# Patient Record
Sex: MALE | Race: WHITE | NOT HISPANIC OR LATINO | URBAN - METROPOLITAN AREA
[De-identification: names, ages, dates, MRNs, and addresses within clinical notes are randomized per-mention and may not be internally consistent; named-entity substitution may affect disease eponyms.]

---

## 2017-07-09 ENCOUNTER — INPATIENT (INPATIENT)
Facility: HOSPITAL | Age: 72
LOS: 4 days | Discharge: HOME | End: 2017-07-14
Attending: INTERNAL MEDICINE | Admitting: INTERNAL MEDICINE
Payer: MEDICARE

## 2017-07-09 VITALS
OXYGEN SATURATION: 97 % | SYSTOLIC BLOOD PRESSURE: 154 MMHG | DIASTOLIC BLOOD PRESSURE: 83 MMHG | TEMPERATURE: 99 F | RESPIRATION RATE: 16 BRPM | HEART RATE: 85 BPM

## 2017-07-09 DIAGNOSIS — Z29.9 ENCOUNTER FOR PROPHYLACTIC MEASURES, UNSPECIFIED: ICD-10-CM

## 2017-07-09 DIAGNOSIS — E78.5 HYPERLIPIDEMIA, UNSPECIFIED: ICD-10-CM

## 2017-07-09 DIAGNOSIS — R06.02 SHORTNESS OF BREATH: ICD-10-CM

## 2017-07-09 DIAGNOSIS — E11.9 TYPE 2 DIABETES MELLITUS WITHOUT COMPLICATIONS: ICD-10-CM

## 2017-07-09 DIAGNOSIS — I10 ESSENTIAL (PRIMARY) HYPERTENSION: ICD-10-CM

## 2017-07-09 DIAGNOSIS — I25.10 ATHEROSCLEROTIC HEART DISEASE OF NATIVE CORONARY ARTERY WITHOUT ANGINA PECTORIS: ICD-10-CM

## 2017-07-09 DIAGNOSIS — I50.9 HEART FAILURE, UNSPECIFIED: ICD-10-CM

## 2017-07-09 DIAGNOSIS — C61 MALIGNANT NEOPLASM OF PROSTATE: ICD-10-CM

## 2017-07-09 LAB
ALBUMIN SERPL ELPH-MCNC: 3.6 G/DL — SIGNIFICANT CHANGE UP (ref 3.3–5)
ALP SERPL-CCNC: 76 U/L — SIGNIFICANT CHANGE UP (ref 40–120)
ALT FLD-CCNC: 10 U/L — SIGNIFICANT CHANGE UP (ref 4–41)
AST SERPL-CCNC: 14 U/L — SIGNIFICANT CHANGE UP (ref 4–40)
BASE EXCESS BLDV CALC-SCNC: 5.8 MMOL/L — SIGNIFICANT CHANGE UP
BASOPHILS # BLD AUTO: 0.03 K/UL — SIGNIFICANT CHANGE UP (ref 0–0.2)
BASOPHILS NFR BLD AUTO: 0.3 % — SIGNIFICANT CHANGE UP (ref 0–2)
BILIRUB SERPL-MCNC: 0.2 MG/DL — SIGNIFICANT CHANGE UP (ref 0.2–1.2)
BLOOD GAS VENOUS - CREATININE: 1.16 MG/DL — SIGNIFICANT CHANGE UP (ref 0.5–1.3)
BUN SERPL-MCNC: 24 MG/DL — HIGH (ref 7–23)
CALCIUM SERPL-MCNC: 9.5 MG/DL — SIGNIFICANT CHANGE UP (ref 8.4–10.5)
CHLORIDE BLDV-SCNC: 107 MMOL/L — SIGNIFICANT CHANGE UP (ref 96–108)
CHLORIDE SERPL-SCNC: 102 MMOL/L — SIGNIFICANT CHANGE UP (ref 98–107)
CHOLEST SERPL-MCNC: 118 MG/DL — LOW (ref 120–199)
CK MB BLD-MCNC: 2.03 NG/ML — SIGNIFICANT CHANGE UP (ref 1–6.6)
CK MB BLD-MCNC: 2.19 NG/ML — SIGNIFICANT CHANGE UP (ref 1–6.6)
CK MB BLD-MCNC: SIGNIFICANT CHANGE UP (ref 0–2.5)
CK SERPL-CCNC: 56 U/L — SIGNIFICANT CHANGE UP (ref 30–200)
CK SERPL-CCNC: 65 U/L — SIGNIFICANT CHANGE UP (ref 30–200)
CO2 SERPL-SCNC: 26 MMOL/L — SIGNIFICANT CHANGE UP (ref 22–31)
CREAT SERPL-MCNC: 1.26 MG/DL — SIGNIFICANT CHANGE UP (ref 0.5–1.3)
EOSINOPHIL # BLD AUTO: 0.16 K/UL — SIGNIFICANT CHANGE UP (ref 0–0.5)
EOSINOPHIL NFR BLD AUTO: 1.8 % — SIGNIFICANT CHANGE UP (ref 0–6)
GAS PNL BLDV: 137 MMOL/L — SIGNIFICANT CHANGE UP (ref 136–146)
GLUCOSE BLDV-MCNC: 132 — HIGH (ref 70–99)
GLUCOSE SERPL-MCNC: 131 MG/DL — HIGH (ref 70–99)
HCO3 BLDV-SCNC: 28 MMOL/L — HIGH (ref 20–27)
HCT VFR BLD CALC: 35.7 % — LOW (ref 39–50)
HCT VFR BLDV CALC: 36.7 % — LOW (ref 39–51)
HDLC SERPL-MCNC: 35 MG/DL — SIGNIFICANT CHANGE UP (ref 35–55)
HGB BLD-MCNC: 11.6 G/DL — LOW (ref 13–17)
HGB BLDV-MCNC: 11.9 G/DL — LOW (ref 13–17)
IMM GRANULOCYTES # BLD AUTO: 0.03 # — SIGNIFICANT CHANGE UP
IMM GRANULOCYTES NFR BLD AUTO: 0.3 % — SIGNIFICANT CHANGE UP (ref 0–1.5)
LACTATE BLDV-MCNC: 1.3 MMOL/L — SIGNIFICANT CHANGE UP (ref 0.5–2)
LIPID PNL WITH DIRECT LDL SERPL: 69 MG/DL — SIGNIFICANT CHANGE UP
LYMPHOCYTES # BLD AUTO: 1.35 K/UL — SIGNIFICANT CHANGE UP (ref 1–3.3)
LYMPHOCYTES # BLD AUTO: 15.2 % — SIGNIFICANT CHANGE UP (ref 13–44)
MCHC RBC-ENTMCNC: 29.1 PG — SIGNIFICANT CHANGE UP (ref 27–34)
MCHC RBC-ENTMCNC: 32.5 % — SIGNIFICANT CHANGE UP (ref 32–36)
MCV RBC AUTO: 89.7 FL — SIGNIFICANT CHANGE UP (ref 80–100)
MONOCYTES # BLD AUTO: 0.62 K/UL — SIGNIFICANT CHANGE UP (ref 0–0.9)
MONOCYTES NFR BLD AUTO: 7 % — SIGNIFICANT CHANGE UP (ref 2–14)
NEUTROPHILS # BLD AUTO: 6.7 K/UL — SIGNIFICANT CHANGE UP (ref 1.8–7.4)
NEUTROPHILS NFR BLD AUTO: 75.4 % — SIGNIFICANT CHANGE UP (ref 43–77)
NRBC # FLD: 0 — SIGNIFICANT CHANGE UP
NT-PROBNP SERPL-SCNC: 651 PG/ML — SIGNIFICANT CHANGE UP
PCO2 BLDV: 52 MMHG — HIGH (ref 41–51)
PH BLDV: 7.39 PH — SIGNIFICANT CHANGE UP (ref 7.32–7.43)
PLATELET # BLD AUTO: 137 K/UL — LOW (ref 150–400)
PMV BLD: 12.4 FL — SIGNIFICANT CHANGE UP (ref 7–13)
PO2 BLDV: 32 MMHG — LOW (ref 35–40)
POTASSIUM BLDV-SCNC: 3.8 MMOL/L — SIGNIFICANT CHANGE UP (ref 3.4–4.5)
POTASSIUM SERPL-MCNC: 4.2 MMOL/L — SIGNIFICANT CHANGE UP (ref 3.5–5.3)
POTASSIUM SERPL-SCNC: 4.2 MMOL/L — SIGNIFICANT CHANGE UP (ref 3.5–5.3)
PROT SERPL-MCNC: 7.4 G/DL — SIGNIFICANT CHANGE UP (ref 6–8.3)
RBC # BLD: 3.98 M/UL — LOW (ref 4.2–5.8)
RBC # FLD: 13.3 % — SIGNIFICANT CHANGE UP (ref 10.3–14.5)
SAO2 % BLDV: 57.2 % — LOW (ref 60–85)
SODIUM SERPL-SCNC: 144 MMOL/L — SIGNIFICANT CHANGE UP (ref 135–145)
TRIGL SERPL-MCNC: 127 MG/DL — SIGNIFICANT CHANGE UP (ref 10–149)
TROPONIN T SERPL-MCNC: < 0.06 NG/ML — SIGNIFICANT CHANGE UP (ref 0–0.06)
TROPONIN T SERPL-MCNC: < 0.06 NG/ML — SIGNIFICANT CHANGE UP (ref 0–0.06)
WBC # BLD: 8.89 K/UL — SIGNIFICANT CHANGE UP (ref 3.8–10.5)
WBC # FLD AUTO: 8.89 K/UL — SIGNIFICANT CHANGE UP (ref 3.8–10.5)

## 2017-07-09 PROCEDURE — 99223 1ST HOSP IP/OBS HIGH 75: CPT | Mod: GC

## 2017-07-09 PROCEDURE — 71020: CPT | Mod: 26

## 2017-07-09 PROCEDURE — 71275 CT ANGIOGRAPHY CHEST: CPT | Mod: 26

## 2017-07-09 RX ORDER — AMLODIPINE BESYLATE 2.5 MG/1
5 TABLET ORAL DAILY
Qty: 0 | Refills: 0 | Status: DISCONTINUED | OUTPATIENT
Start: 2017-07-09 | End: 2017-07-14

## 2017-07-09 RX ORDER — DEXTROSE 50 % IN WATER 50 %
25 SYRINGE (ML) INTRAVENOUS ONCE
Qty: 0 | Refills: 0 | Status: DISCONTINUED | OUTPATIENT
Start: 2017-07-09 | End: 2017-07-14

## 2017-07-09 RX ORDER — INSULIN LISPRO 100/ML
VIAL (ML) SUBCUTANEOUS AT BEDTIME
Qty: 0 | Refills: 0 | Status: DISCONTINUED | OUTPATIENT
Start: 2017-07-09 | End: 2017-07-14

## 2017-07-09 RX ORDER — LOSARTAN POTASSIUM 100 MG/1
100 TABLET, FILM COATED ORAL DAILY
Qty: 0 | Refills: 0 | Status: DISCONTINUED | OUTPATIENT
Start: 2017-07-09 | End: 2017-07-14

## 2017-07-09 RX ORDER — ASPIRIN/CALCIUM CARB/MAGNESIUM 324 MG
81 TABLET ORAL DAILY
Qty: 0 | Refills: 0 | Status: DISCONTINUED | OUTPATIENT
Start: 2017-07-09 | End: 2017-07-14

## 2017-07-09 RX ORDER — DEXTROSE 50 % IN WATER 50 %
1 SYRINGE (ML) INTRAVENOUS ONCE
Qty: 0 | Refills: 0 | Status: DISCONTINUED | OUTPATIENT
Start: 2017-07-09 | End: 2017-07-14

## 2017-07-09 RX ORDER — CARVEDILOL PHOSPHATE 80 MG/1
12.5 CAPSULE, EXTENDED RELEASE ORAL EVERY 12 HOURS
Qty: 0 | Refills: 0 | Status: DISCONTINUED | OUTPATIENT
Start: 2017-07-09 | End: 2017-07-14

## 2017-07-09 RX ORDER — ATORVASTATIN CALCIUM 80 MG/1
40 TABLET, FILM COATED ORAL AT BEDTIME
Qty: 0 | Refills: 0 | Status: DISCONTINUED | OUTPATIENT
Start: 2017-07-09 | End: 2017-07-14

## 2017-07-09 RX ORDER — INSULIN LISPRO 100/ML
VIAL (ML) SUBCUTANEOUS
Qty: 0 | Refills: 0 | Status: DISCONTINUED | OUTPATIENT
Start: 2017-07-09 | End: 2017-07-14

## 2017-07-09 RX ORDER — DEXTROSE 50 % IN WATER 50 %
12.5 SYRINGE (ML) INTRAVENOUS ONCE
Qty: 0 | Refills: 0 | Status: DISCONTINUED | OUTPATIENT
Start: 2017-07-09 | End: 2017-07-14

## 2017-07-09 RX ORDER — SODIUM CHLORIDE 9 MG/ML
1000 INJECTION INTRAMUSCULAR; INTRAVENOUS; SUBCUTANEOUS ONCE
Qty: 0 | Refills: 0 | Status: DISCONTINUED | OUTPATIENT
Start: 2017-07-09 | End: 2017-07-09

## 2017-07-09 RX ORDER — ENZALUTAMIDE 80 MG/1
120 TABLET ORAL DAILY
Qty: 0 | Refills: 0 | Status: DISCONTINUED | OUTPATIENT
Start: 2017-07-09 | End: 2017-07-11

## 2017-07-09 RX ORDER — IPRATROPIUM/ALBUTEROL SULFATE 18-103MCG
3 AEROSOL WITH ADAPTER (GRAM) INHALATION EVERY 6 HOURS
Qty: 0 | Refills: 0 | Status: DISCONTINUED | OUTPATIENT
Start: 2017-07-09 | End: 2017-07-14

## 2017-07-09 RX ORDER — GABAPENTIN 400 MG/1
300 CAPSULE ORAL DAILY
Qty: 0 | Refills: 0 | Status: DISCONTINUED | OUTPATIENT
Start: 2017-07-09 | End: 2017-07-14

## 2017-07-09 RX ORDER — AZITHROMYCIN 500 MG/1
500 TABLET, FILM COATED ORAL ONCE
Qty: 0 | Refills: 0 | Status: COMPLETED | OUTPATIENT
Start: 2017-07-09 | End: 2017-07-09

## 2017-07-09 RX ORDER — GLUCAGON INJECTION, SOLUTION 0.5 MG/.1ML
1 INJECTION, SOLUTION SUBCUTANEOUS ONCE
Qty: 0 | Refills: 0 | Status: DISCONTINUED | OUTPATIENT
Start: 2017-07-09 | End: 2017-07-14

## 2017-07-09 RX ORDER — FUROSEMIDE 40 MG
20 TABLET ORAL DAILY
Qty: 0 | Refills: 0 | Status: DISCONTINUED | OUTPATIENT
Start: 2017-07-09 | End: 2017-07-14

## 2017-07-09 RX ORDER — CEFTRIAXONE 500 MG/1
1 INJECTION, POWDER, FOR SOLUTION INTRAMUSCULAR; INTRAVENOUS ONCE
Qty: 0 | Refills: 0 | Status: COMPLETED | OUTPATIENT
Start: 2017-07-09 | End: 2017-07-09

## 2017-07-09 RX ORDER — NORTRIPTYLINE HYDROCHLORIDE 10 MG/1
25 CAPSULE ORAL DAILY
Qty: 0 | Refills: 0 | Status: DISCONTINUED | OUTPATIENT
Start: 2017-07-09 | End: 2017-07-14

## 2017-07-09 RX ORDER — ENOXAPARIN SODIUM 100 MG/ML
40 INJECTION SUBCUTANEOUS EVERY 24 HOURS
Qty: 0 | Refills: 0 | Status: DISCONTINUED | OUTPATIENT
Start: 2017-07-09 | End: 2017-07-14

## 2017-07-09 RX ADMIN — Medication: at 12:46

## 2017-07-09 RX ADMIN — Medication 3 MILLILITER(S): at 09:56

## 2017-07-09 RX ADMIN — CARVEDILOL PHOSPHATE 12.5 MILLIGRAM(S): 80 CAPSULE, EXTENDED RELEASE ORAL at 19:09

## 2017-07-09 RX ADMIN — GABAPENTIN 300 MILLIGRAM(S): 400 CAPSULE ORAL at 11:57

## 2017-07-09 RX ADMIN — NORTRIPTYLINE HYDROCHLORIDE 25 MILLIGRAM(S): 10 CAPSULE ORAL at 11:57

## 2017-07-09 RX ADMIN — CEFTRIAXONE 100 GRAM(S): 500 INJECTION, POWDER, FOR SOLUTION INTRAMUSCULAR; INTRAVENOUS at 07:46

## 2017-07-09 RX ADMIN — AZITHROMYCIN 250 MILLIGRAM(S): 500 TABLET, FILM COATED ORAL at 08:38

## 2017-07-09 RX ADMIN — AMLODIPINE BESYLATE 5 MILLIGRAM(S): 2.5 TABLET ORAL at 11:57

## 2017-07-09 RX ADMIN — ENOXAPARIN SODIUM 40 MILLIGRAM(S): 100 INJECTION SUBCUTANEOUS at 11:56

## 2017-07-09 RX ADMIN — Medication 81 MILLIGRAM(S): at 11:57

## 2017-07-09 RX ADMIN — ATORVASTATIN CALCIUM 40 MILLIGRAM(S): 80 TABLET, FILM COATED ORAL at 21:34

## 2017-07-09 RX ADMIN — Medication 20 MILLIGRAM(S): at 11:57

## 2017-07-09 RX ADMIN — LOSARTAN POTASSIUM 100 MILLIGRAM(S): 100 TABLET, FILM COATED ORAL at 11:57

## 2017-07-09 NOTE — ED PROVIDER NOTE - MEDICAL DECISION MAKING DETAILS
71 YOM PMH CAD CABG HLD CABG prostate CA neuropathy s/p mauro p/w SOB while laying flat after recent DX of LL PNA TX with levofloxacin.  VSS WNL.  Cough, rales, failing outpatient PO medication.  Evaluate for PNA, treat symptomatically.  Reassess.

## 2017-07-09 NOTE — CONSULT NOTE ADULT - SUBJECTIVE AND OBJECTIVE BOX
Oncology Consult Note    HPI:  72 yo male with CAD s/p CABG, Prostate Cancer on Xtandi/casodex presented with SOB. Per the patient he has been complaining of SOB, cough went to an urgent care. He was found to have Pneumonia and prescribed Levaquin. His symptoms got worse and decided to come to the ED. He also is complaining of dyspnea on exertion, orthopnea and b/l LE edema.  Pt denies fever, chills, wheezing, fatigue, weight gain, chest pain or palpitations.     Allergies    No Known Allergies    Intolerances        MEDICATIONS  (STANDING):  enoxaparin Injectable 40 milliGRAM(s) SubCutaneous every 24 hours  insulin lispro (HumaLOG) corrective regimen sliding scale   SubCutaneous three times a day before meals  insulin lispro (HumaLOG) corrective regimen sliding scale   SubCutaneous at bedtime  dextrose 50% Injectable 12.5 Gram(s) IV Push once  dextrose 50% Injectable 25 Gram(s) IV Push once  dextrose 50% Injectable 25 Gram(s) IV Push once  aspirin enteric coated 81 milliGRAM(s) Oral daily  atorvastatin 40 milliGRAM(s) Oral at bedtime  carvedilol 12.5 milliGRAM(s) Oral every 12 hours  furosemide    Tablet 20 milliGRAM(s) Oral daily  losartan 100 milliGRAM(s) Oral daily  gabapentin 300 milliGRAM(s) Oral daily  nortriptyline 25 milliGRAM(s) Oral daily  amLODIPine   Tablet 5 milliGRAM(s) Oral daily  levoFLOXacin  Tablet 750 milliGRAM(s) Oral every 24 hours  enzalutamide 120 milliGRAM(s) Oral daily    MEDICATIONS  (PRN):  dextrose Gel 1 Dose(s) Oral once PRN Blood Glucose LESS THAN 70 milliGRAM(s)/deciliter  glucagon  Injectable 1 milliGRAM(s) IntraMuscular once PRN Glucose LESS THAN 70 milligrams/deciliter  ALBUTerol/ipratropium for Nebulization 3 milliLiter(s) Nebulizer every 6 hours PRN Shortness of Breath and/or Wheezing      PAST MEDICAL & SURGICAL HISTORY:  HTN (hypertension)  Prostate cancer  Hyperlipidemia  Neuropathy  Diabetes mellitus, type 2  CAD (coronary artery disease)  History of cholecystectomy  H/O coronary artery bypass surgery      FAMILY HISTORY:  No pertinent family history in first degree relatives      SOCIAL HISTORY: No EtOH, no tobacco    REVIEW OF SYSTEMS:    CONSTITUTIONAL: No weakness, fevers or chills  EYES/ENT: No visual changes;  No vertigo or throat pain   NECK: No pain or stiffness  RESPIRATORY: cough, Denies any wheezing, hemoptysis; No shortness of breath  CARDIOVASCULAR: No chest pain or palpitations  GASTROINTESTINAL: No abdominal or epigastric pain. No nausea, vomiting, or hematemesis; No diarrhea or constipation. No melena or hematochezia.  GENITOURINARY: No dysuria, frequency or hematuria  NEUROLOGICAL: No numbness or weakness  SKIN: No itching, burning, rashes, or lesions   All other review of systems is negative unless indicated above.    Height (cm): 172.72 (07-09 @ 09:42)  Weight (kg): 99.8 (07-09 @ 09:42)  BMI (kg/m2): 33.5 (07-09 @ 09:42)  BSA (m2): 2.13 (07-09 @ 09:42)    T(F): 97.3 (07-09-17 @ 11:30), Max: 99.1 (07-09-17 @ 05:24)  HR: 93 (07-09-17 @ 14:54)  BP: 160/90 (07-09-17 @ 14:54)  RR: 20 (07-09-17 @ 14:54)  SpO2: 98% (07-09-17 @ 14:54)  Wt(kg): --    GENERAL: NAD, well-developed  HEAD:  Atraumatic, Normocephalic  EYES: EOMI, PERRLA, conjunctiva and sclera clear  NECK: Supple, No JVD  CHEST/LUNG: Clear to auscultation bilaterally; No wheeze  HEART: Regular rate and rhythm; No murmurs, rubs, or gallops  ABDOMEN: Soft, Nontender, Nondistended; Bowel sounds present  EXTREMITIES:  edema in the lower extremities bilateral.   NEUROLOGY: non-focal  SKIN: No rashes or lesions                          11.6   8.89  )-----------( 137      ( 09 Jul 2017 05:07 )             35.7       07-09    144  |  102  |  24<H>  ----------------------------<  131<H>  4.2   |  26  |  1.26    Ca    9.5      09 Jul 2017 05:07    TPro  7.4  /  Alb  3.6  /  TBili  0.2  /  DBili  x   /  AST  14  /  ALT  10  /  AlkPhos  76  07-09

## 2017-07-09 NOTE — H&P ADULT - HISTORY OF PRESENT ILLNESS
70 yo male PMHx of CAD, CABG, HLD, HTN, Prostate Cancer (on maintenance po med) and recent Pneumonia presents with recurrent non-productive cough and SOB for 3 days. SOB described as dyspnea on exertion, orthopnea and b/l LE edema. Cough is non-productive x 3 weeks. Pt reports he was treated for PNA 2 weeks ago, finished 5 days ago and since symptoms persisted he was put back on Levaquin again. Pt denies fever, chills, wheezing, fatigue, weight gain, chest pain or palpitations.

## 2017-07-09 NOTE — ED ADULT NURSE NOTE - OBJECTIVE STATEMENT
Pt received A&Ox4, NAD to ED Rm 23 with c/o SOB & dry cough (becoming wet - unknown sputum color) sp dx of PNA last week. States he was given levaquin PO & finished course without relief. Denies fever or any other adverse symptom at this time. RR equal & unlabored. Labs sent. Family at bedside. Awaiting MD orders.

## 2017-07-09 NOTE — H&P ADULT - ASSESSMENT
72 yo male PMHx of CAD, CABG, HLD, HTN, Prostate Cancer (on maintenance po med) and recent Pneumonia presents with recurrent non-productive cough and SOB for 3 days, admitted to tele for SOB, r/o ACS.

## 2017-07-09 NOTE — ED PROVIDER NOTE - OBJECTIVE STATEMENT
71 YOM PMH CAD CABG HLD CABG prostate CA neuropathy s/p mauro p/w SOB while laying flat after recent DX of LL PNA TX with levofloxacin.  Pt Dx with PNA 10 days ago. Pt SOB the past four days.  No fevers, chills, sweats, weight loss, fatigue, or malaise. 71 YOM PMH CAD CABG HLD CABG prostate CA neuropathy s/p mauro p/w SOB while laying flat after recent DX of LL PNA TX with levofloxacin.  Pt Dx with PNA 10 days ago. Pt SOB the past four days.  No fevers, chills, sweats, weight loss, fatigue, or malaise.    Attending/Sheyla: 70 yo M as described above with mult med problems, recently treated ~ 10 days ago for PNA (seen and eval at an Ur[facility]) finishing a course of Levaquin p/w 2 days of SOb only when supine in bed for the past two nights. Denies CP, GARNICA, palp, fever/chills, abd pain, n/v. Pt denies recent weight change or change in his LE edema.

## 2017-07-09 NOTE — ED PROVIDER NOTE - PHYSICAL EXAMINATION
Attending/Sheyla:  NAD; PERRL/EOMI, non-icterus, supple, no JVD, no MALDONADO, no JVD, few rales at Lt base, CTAB; Abd-soft, NT/ND, no HSM; +1 pitting LE edema, A&Ox3, nonfocal; Skin-warm/dry

## 2017-07-09 NOTE — ED PROVIDER NOTE - PMH
CAD (coronary artery disease)    Diabetes mellitus, type 2    Hyperlipidemia    Neuropathy    Prostate cancer

## 2017-07-09 NOTE — CONSULT NOTE ADULT - PROBLEM SELECTOR RECOMMENDATION 9
Patient is on Xtandi, it is reported that this medication can cause ~15% of SOB/peripheral edema, but he has been on this medications for the last 3 months. Need to rule out CHF exacerbation or Lung etiology. Currently patient is unable lie down Patient is on Xtandi, it is reported that this medication can cause ~15%-20% of SOB/peripheral edema, but he has been on this medications for the last 3 months. Need to rule out CHF exacerbation or Lung etiology. Currently patient is unable lie down due to SOB. Consider CTA to r/o PE. Per the patient, he had a TTE recently, need to get records. Patient is on Xtandi, it is reported that this medication can cause ~15%-20% of SOB/peripheral edema, but he has been on this medications for the last 3 months. Need to rule out CHF exacerbation or Lung etiology. Currently patient is unable lie down due to SOB. Consider CTA to r/o PE. Per the patient, he had a TTE recently, need to get records.  -Hold Xtandi for now.

## 2017-07-09 NOTE — H&P ADULT - PMH
CAD (coronary artery disease)    Diabetes mellitus, type 2    HTN (hypertension)    Hyperlipidemia    Neuropathy    Prostate cancer

## 2017-07-09 NOTE — ED ADULT NURSE REASSESSMENT NOTE - NS ED NURSE REASSESS COMMENT FT1
pt received from night staff AxOx4, in NAD at this time. Crackles heard bilaterally. Pt denies SOB, chest pain, dizziness/n/v/d at this time. VS are as noted. will continue to monitor.

## 2017-07-09 NOTE — ED ADULT TRIAGE NOTE - CHIEF COMPLAINT QUOTE
pt comes to ED for difficulty breathing. pt was dx with PNA 2 weeks ago and was given PO ABX pt states when he was trying to go to sleep last night he had SOB. pt VSS sating well on RA. EKg performed in triage.

## 2017-07-09 NOTE — H&P ADULT - PROBLEM SELECTOR PLAN 5
tele monitor   cardiac enzymes x 2  Serial EKGs  DASH 1800 ADA diet  continue asa, coreg, furosemide, atorvastatin

## 2017-07-09 NOTE — H&P ADULT - PROBLEM SELECTOR PLAN 1
tele monitor   cardiac enzymes x 2  Serial EKGs  DASH 1800 ADA diet  continue asa, coreg, furosemide, atorvastatin tele monitor   cardiac enzymes x 2  Serial EKGs  DASH 1800 ADA diet  continue asa, coreg, furosemide, atorvastatin  Continue Levaquin PO  consider Pulmonology consult

## 2017-07-10 LAB
ALBUMIN SERPL ELPH-MCNC: 3.6 G/DL — SIGNIFICANT CHANGE UP (ref 3.3–5)
ALP SERPL-CCNC: 77 U/L — SIGNIFICANT CHANGE UP (ref 40–120)
ALT FLD-CCNC: 10 U/L — SIGNIFICANT CHANGE UP (ref 4–41)
AST SERPL-CCNC: 13 U/L — SIGNIFICANT CHANGE UP (ref 4–40)
BASOPHILS # BLD AUTO: 0.04 K/UL — SIGNIFICANT CHANGE UP (ref 0–0.2)
BASOPHILS NFR BLD AUTO: 0.5 % — SIGNIFICANT CHANGE UP (ref 0–2)
BILIRUB SERPL-MCNC: 0.4 MG/DL — SIGNIFICANT CHANGE UP (ref 0.2–1.2)
BUN SERPL-MCNC: 21 MG/DL — SIGNIFICANT CHANGE UP (ref 7–23)
CALCIUM SERPL-MCNC: 9.7 MG/DL — SIGNIFICANT CHANGE UP (ref 8.4–10.5)
CHLORIDE SERPL-SCNC: 100 MMOL/L — SIGNIFICANT CHANGE UP (ref 98–107)
CO2 SERPL-SCNC: 27 MMOL/L — SIGNIFICANT CHANGE UP (ref 22–31)
CREAT SERPL-MCNC: 1.26 MG/DL — SIGNIFICANT CHANGE UP (ref 0.5–1.3)
EOSINOPHIL # BLD AUTO: 0.15 K/UL — SIGNIFICANT CHANGE UP (ref 0–0.5)
EOSINOPHIL NFR BLD AUTO: 1.8 % — SIGNIFICANT CHANGE UP (ref 0–6)
GLUCOSE SERPL-MCNC: 134 MG/DL — HIGH (ref 70–99)
HCT VFR BLD CALC: 36.3 % — LOW (ref 39–50)
HGB BLD-MCNC: 11.9 G/DL — LOW (ref 13–17)
IMM GRANULOCYTES # BLD AUTO: 0.03 # — SIGNIFICANT CHANGE UP
IMM GRANULOCYTES NFR BLD AUTO: 0.4 % — SIGNIFICANT CHANGE UP (ref 0–1.5)
LYMPHOCYTES # BLD AUTO: 1.45 K/UL — SIGNIFICANT CHANGE UP (ref 1–3.3)
LYMPHOCYTES # BLD AUTO: 17.4 % — SIGNIFICANT CHANGE UP (ref 13–44)
MAGNESIUM SERPL-MCNC: 1.7 MG/DL — SIGNIFICANT CHANGE UP (ref 1.6–2.6)
MCHC RBC-ENTMCNC: 29 PG — SIGNIFICANT CHANGE UP (ref 27–34)
MCHC RBC-ENTMCNC: 32.8 % — SIGNIFICANT CHANGE UP (ref 32–36)
MCV RBC AUTO: 88.3 FL — SIGNIFICANT CHANGE UP (ref 80–100)
MONOCYTES # BLD AUTO: 0.75 K/UL — SIGNIFICANT CHANGE UP (ref 0–0.9)
MONOCYTES NFR BLD AUTO: 9 % — SIGNIFICANT CHANGE UP (ref 2–14)
NEUTROPHILS # BLD AUTO: 5.92 K/UL — SIGNIFICANT CHANGE UP (ref 1.8–7.4)
NEUTROPHILS NFR BLD AUTO: 70.9 % — SIGNIFICANT CHANGE UP (ref 43–77)
NRBC # FLD: 0 — SIGNIFICANT CHANGE UP
PHOSPHATE SERPL-MCNC: 3.5 MG/DL — SIGNIFICANT CHANGE UP (ref 2.5–4.5)
PLATELET # BLD AUTO: 150 K/UL — SIGNIFICANT CHANGE UP (ref 150–400)
PMV BLD: 12.4 FL — SIGNIFICANT CHANGE UP (ref 7–13)
POTASSIUM SERPL-MCNC: 3.5 MMOL/L — SIGNIFICANT CHANGE UP (ref 3.5–5.3)
POTASSIUM SERPL-SCNC: 3.5 MMOL/L — SIGNIFICANT CHANGE UP (ref 3.5–5.3)
PROT SERPL-MCNC: 7.5 G/DL — SIGNIFICANT CHANGE UP (ref 6–8.3)
RBC # BLD: 4.11 M/UL — LOW (ref 4.2–5.8)
RBC # FLD: 13.4 % — SIGNIFICANT CHANGE UP (ref 10.3–14.5)
SODIUM SERPL-SCNC: 142 MMOL/L — SIGNIFICANT CHANGE UP (ref 135–145)
SPECIMEN SOURCE: SIGNIFICANT CHANGE UP
SPECIMEN SOURCE: SIGNIFICANT CHANGE UP
WBC # BLD: 8.34 K/UL — SIGNIFICANT CHANGE UP (ref 3.8–10.5)
WBC # FLD AUTO: 8.34 K/UL — SIGNIFICANT CHANGE UP (ref 3.8–10.5)

## 2017-07-10 RX ORDER — POTASSIUM CHLORIDE 20 MEQ
20 PACKET (EA) ORAL ONCE
Qty: 0 | Refills: 0 | Status: COMPLETED | OUTPATIENT
Start: 2017-07-10 | End: 2017-07-10

## 2017-07-10 RX ORDER — MAGNESIUM OXIDE 400 MG ORAL TABLET 241.3 MG
400 TABLET ORAL
Qty: 0 | Refills: 0 | Status: COMPLETED | OUTPATIENT
Start: 2017-07-10 | End: 2017-07-11

## 2017-07-10 RX ADMIN — LOSARTAN POTASSIUM 100 MILLIGRAM(S): 100 TABLET, FILM COATED ORAL at 05:20

## 2017-07-10 RX ADMIN — MAGNESIUM OXIDE 400 MG ORAL TABLET 400 MILLIGRAM(S): 241.3 TABLET ORAL at 17:25

## 2017-07-10 RX ADMIN — NORTRIPTYLINE HYDROCHLORIDE 25 MILLIGRAM(S): 10 CAPSULE ORAL at 13:03

## 2017-07-10 RX ADMIN — CARVEDILOL PHOSPHATE 12.5 MILLIGRAM(S): 80 CAPSULE, EXTENDED RELEASE ORAL at 05:19

## 2017-07-10 RX ADMIN — Medication 20 MILLIGRAM(S): at 05:20

## 2017-07-10 RX ADMIN — AMLODIPINE BESYLATE 5 MILLIGRAM(S): 2.5 TABLET ORAL at 05:20

## 2017-07-10 RX ADMIN — Medication 20 MILLIEQUIVALENT(S): at 17:25

## 2017-07-10 RX ADMIN — Medication 81 MILLIGRAM(S): at 13:03

## 2017-07-10 RX ADMIN — GABAPENTIN 300 MILLIGRAM(S): 400 CAPSULE ORAL at 13:03

## 2017-07-10 RX ADMIN — CARVEDILOL PHOSPHATE 12.5 MILLIGRAM(S): 80 CAPSULE, EXTENDED RELEASE ORAL at 17:25

## 2017-07-10 RX ADMIN — ATORVASTATIN CALCIUM 40 MILLIGRAM(S): 80 TABLET, FILM COATED ORAL at 21:31

## 2017-07-10 RX ADMIN — ENOXAPARIN SODIUM 40 MILLIGRAM(S): 100 INJECTION SUBCUTANEOUS at 15:53

## 2017-07-10 NOTE — CONSULT NOTE ADULT - SUBJECTIVE AND OBJECTIVE BOX
CHIEF COMPLAINT: sob/ cough     HISTORY OF PRESENT ILLNESS:  1 yo male PMHx of CAD, CABG, HLD, HTN, Prostate Cancer (on maintenance po med) and recent Pneumonia presents with recurrent non-productive cough and SOB for 3 days. SOB described as dyspnea on exertion, orthopnea and b/l LE edema. Cough is non-productive x 3 weeks. Pt reports he was treated for PNA 2 weeks ago, finished 5 days ago and since symptoms persisted he was put back on Levaquin again. Pt denies fever, chills, wheezing, fatigue, weight gain, chest pain or palpitations.      PAST MEDICAL & SURGICAL HISTORY:  HTN (hypertension)  Prostate cancer  Hyperlipidemia  Neuropathy  Diabetes mellitus, type 2  CAD (coronary artery disease)  History of cholecystectomy  H/O coronary artery bypass surgery          MEDICATIONS:  enoxaparin Injectable 40 milliGRAM(s) SubCutaneous every 24 hours  aspirin enteric coated 81 milliGRAM(s) Oral daily  carvedilol 12.5 milliGRAM(s) Oral every 12 hours  furosemide    Tablet 20 milliGRAM(s) Oral daily  losartan 100 milliGRAM(s) Oral daily  amLODIPine   Tablet 5 milliGRAM(s) Oral daily    levoFLOXacin  Tablet 750 milliGRAM(s) Oral every 24 hours    ALBUTerol/ipratropium for Nebulization 3 milliLiter(s) Nebulizer every 6 hours PRN    gabapentin 300 milliGRAM(s) Oral daily  nortriptyline 25 milliGRAM(s) Oral daily      insulin lispro (HumaLOG) corrective regimen sliding scale   SubCutaneous three times a day before meals  insulin lispro (HumaLOG) corrective regimen sliding scale   SubCutaneous at bedtime  dextrose Gel 1 Dose(s) Oral once PRN  dextrose 50% Injectable 12.5 Gram(s) IV Push once  dextrose 50% Injectable 25 Gram(s) IV Push once  dextrose 50% Injectable 25 Gram(s) IV Push once  glucagon  Injectable 1 milliGRAM(s) IntraMuscular once PRN  atorvastatin 40 milliGRAM(s) Oral at bedtime    enzalutamide 120 milliGRAM(s) Oral daily  magnesium oxide 400 milliGRAM(s) Oral three times a day with meals      FAMILY HISTORY:  No pertinent family history in first degree relatives      Non-contributory    SOCIAL HISTORY:    No tobacco, drugs or etoh    Allergies    No Known Allergies    Intolerances    	    REVIEW OF SYSTEMS:  as above  The rest of the 14 points ROS reviewed and except above they are unremarkable.        PHYSICAL EXAM:  T(C): 36.8 (07-10-17 @ 13:51), Max: 37.1 (07-10-17 @ 05:00)  HR: 98 (07-10-17 @ 17:20) (77 - 98)  BP: 139/78 (07-10-17 @ 17:20) (139/78 - 153/75)  RR: 18 (07-10-17 @ 17:20) (17 - 18)  SpO2: 99% (07-10-17 @ 17:20) (99% - 100%)  Wt(kg): --  I&O's Summary    09 Jul 2017 07:01  -  10 Jul 2017 07:00  --------------------------------------------------------  IN: 120 mL / OUT: 850 mL / NET: -730 mL    10 Jul 2017 07:01  -  10 Jul 2017 19:07  --------------------------------------------------------  IN: 360 mL / OUT: 0 mL / NET: 360 mL        Appearance: Normal	  HEENT:   Normal oral mucosa, PERRL, EOMI	  Cardiovascular: Normal S1 S2,    Murmur:   Neck: JVP normal  Respiratory: Lungs wheeze  Gastrointestinal:  Soft, Non-tender, + BS	  Skin: normal   Neuro: No gross deficits.   Psychiatry:  Mood & affect appropriate  Ext: No edema    TELEMETRY: 	    ECG:  	  RADIOLOGY:  OTHER: 	  	  LABS:	 	    CARDIAC MARKERS:  Troponin T, Serum: < 0.06 ng/mL (07-09 @ 12:15)  Troponin T, Serum: < 0.06 ng/mL (07-09 @ 05:07)      CKMB: 2.19 ng/mL (07-09 @ 12:15)  CKMB: 2.03 ng/mL (07-09 @ 05:07)                              11.9   8.34  )-----------( 150      ( 10 Jul 2017 11:53 )             36.3     07-10    142  |  100  |  21  ----------------------------<  134<H>  3.5   |  27  |  1.26    Ca    9.7      10 Jul 2017 11:53  Phos  3.5     07-10  Mg     1.7     07-10    TPro  7.5  /  Alb  3.6  /  TBili  0.4  /  DBili  x   /  AST  13  /  ALT  10  /  AlkPhos  77  07-10    proBNP: Serum Pro-Brain Natriuretic Peptide: 651.0 pg/mL (07-09 @ 05:07)    Lipid Profile:   HgA1c:   TSH:

## 2017-07-11 LAB
BUN SERPL-MCNC: 23 MG/DL — SIGNIFICANT CHANGE UP (ref 7–23)
CALCIUM SERPL-MCNC: 9.6 MG/DL — SIGNIFICANT CHANGE UP (ref 8.4–10.5)
CHLORIDE SERPL-SCNC: 100 MMOL/L — SIGNIFICANT CHANGE UP (ref 98–107)
CO2 SERPL-SCNC: 23 MMOL/L — SIGNIFICANT CHANGE UP (ref 22–31)
CREAT SERPL-MCNC: 1.32 MG/DL — HIGH (ref 0.5–1.3)
GLUCOSE SERPL-MCNC: 120 MG/DL — HIGH (ref 70–99)
HCT VFR BLD CALC: 37 % — LOW (ref 39–50)
HGB BLD-MCNC: 12 G/DL — LOW (ref 13–17)
MAGNESIUM SERPL-MCNC: 1.9 MG/DL — SIGNIFICANT CHANGE UP (ref 1.6–2.6)
MCHC RBC-ENTMCNC: 29 PG — SIGNIFICANT CHANGE UP (ref 27–34)
MCHC RBC-ENTMCNC: 32.4 % — SIGNIFICANT CHANGE UP (ref 32–36)
MCV RBC AUTO: 89.4 FL — SIGNIFICANT CHANGE UP (ref 80–100)
NRBC # FLD: 0 — SIGNIFICANT CHANGE UP
PLATELET # BLD AUTO: 128 K/UL — LOW (ref 150–400)
PMV BLD: 12.8 FL — SIGNIFICANT CHANGE UP (ref 7–13)
POTASSIUM SERPL-MCNC: 4.2 MMOL/L — SIGNIFICANT CHANGE UP (ref 3.5–5.3)
POTASSIUM SERPL-SCNC: 4.2 MMOL/L — SIGNIFICANT CHANGE UP (ref 3.5–5.3)
RBC # BLD: 4.14 M/UL — LOW (ref 4.2–5.8)
RBC # FLD: 13.2 % — SIGNIFICANT CHANGE UP (ref 10.3–14.5)
SODIUM SERPL-SCNC: 141 MMOL/L — SIGNIFICANT CHANGE UP (ref 135–145)
WBC # BLD: 7.1 K/UL — SIGNIFICANT CHANGE UP (ref 3.8–10.5)
WBC # FLD AUTO: 7.1 K/UL — SIGNIFICANT CHANGE UP (ref 3.8–10.5)

## 2017-07-11 RX ADMIN — NORTRIPTYLINE HYDROCHLORIDE 25 MILLIGRAM(S): 10 CAPSULE ORAL at 11:51

## 2017-07-11 RX ADMIN — CARVEDILOL PHOSPHATE 12.5 MILLIGRAM(S): 80 CAPSULE, EXTENDED RELEASE ORAL at 05:49

## 2017-07-11 RX ADMIN — Medication 20 MILLIGRAM(S): at 05:49

## 2017-07-11 RX ADMIN — CARVEDILOL PHOSPHATE 12.5 MILLIGRAM(S): 80 CAPSULE, EXTENDED RELEASE ORAL at 17:14

## 2017-07-11 RX ADMIN — Medication 81 MILLIGRAM(S): at 11:38

## 2017-07-11 RX ADMIN — ENOXAPARIN SODIUM 40 MILLIGRAM(S): 100 INJECTION SUBCUTANEOUS at 11:38

## 2017-07-11 RX ADMIN — AMLODIPINE BESYLATE 5 MILLIGRAM(S): 2.5 TABLET ORAL at 05:49

## 2017-07-11 RX ADMIN — LOSARTAN POTASSIUM 100 MILLIGRAM(S): 100 TABLET, FILM COATED ORAL at 05:49

## 2017-07-11 RX ADMIN — MAGNESIUM OXIDE 400 MG ORAL TABLET 400 MILLIGRAM(S): 241.3 TABLET ORAL at 17:14

## 2017-07-11 RX ADMIN — MAGNESIUM OXIDE 400 MG ORAL TABLET 400 MILLIGRAM(S): 241.3 TABLET ORAL at 11:51

## 2017-07-11 RX ADMIN — ATORVASTATIN CALCIUM 40 MILLIGRAM(S): 80 TABLET, FILM COATED ORAL at 21:49

## 2017-07-11 RX ADMIN — GABAPENTIN 300 MILLIGRAM(S): 400 CAPSULE ORAL at 11:38

## 2017-07-11 NOTE — CONSULT NOTE ADULT - PROBLEM SELECTOR RECOMMENDATION 9
the etiology of SOB is not clear.  he had recently taken antibiotics for possible pneumonia, but still felt sob and came to hospital. There is no PE on ct scan but there is evidence of mucus plugging vs malignancy: I have offered bronchoscopy to the patient, He asked me to call the wife which I did. I have explained about bronchoscopy to the pts wife and shed will talk to his  and will let me know: I prefer inpatient bronchoscopy. SOB could also be related to xtandi, which has been stopped for now.

## 2017-07-11 NOTE — CONSULT NOTE ADULT - SUBJECTIVE AND OBJECTIVE BOX
I have seen and examined the patient     spoke to his wife had pneumonia for which he took levofloxacin for 5 days, he still felt sick[facility] gave him levofloxacin for 10 days more, but did not feel better:  came to hospital: [facility] he was prescribed levofloxacin again. First time he got levofloxacin on june 23rd: A gap of 10 days. Pt is also on xtandi ( enzalutamide) also which reportedly can give SOB as well as increased risk of infection     Patient is a 71y old  Male who presents with a chief complaint of SOB (09 July 2017 09:42)      HPI:  70 yo male PMHx of CAD, CABG, HLD, HTN, Prostate Cancer (on maintenance po med) and recent Pneumonia presents with recurrent non-productive cough and SOB for 3 days. SOB described as dyspnea on exertion, orthopnea and b/l LE edema. Cough is non-productive x 3 weeks. Pt reports he was treated for PNA 2 weeks ago, finished 5 days ago and since symptoms persisted he was put back on Levaquin again. Pt denies fever, chills, wheezing, fatigue, weight gain, chest pain or palpitations. (09 Jul 2017 09:42)      ?FOLLOWING PRESENT  [ ] Hx of PE/DVT, [ ] Hx COPD, [ ] Hx of Asthma, [ ] Hx of Hospitalization, [ ]  Hx of BiPAP/CPAP use, [ ] Hx of SHYANN    Allergies    No Known Allergies    Intolerances        PAST MEDICAL & SURGICAL HISTORY:  HTN (hypertension)  Prostate cancer  Hyperlipidemia  Neuropathy  Diabetes mellitus, type 2  CAD (coronary artery disease)  History of cholecystectomy  H/O coronary artery bypass surgery      FAMILY HISTORY:  No pertinent family history in first degree relatives      Social History: [ ex ] TOBACCO   : remote histroy                [ x ] ETOH                                 [ x ] IVDA/DRUGS    REVIEW OF SYSTEMS      General:	x    Skin/Breast:x  	  Ophthalmologic:x  	  ENMT:	x    Respiratory and Thorax:c cough  	  Cardiovascular:	x    Gastrointestinal:	x    Genitourinary:	x    Musculoskeletal:	x    Neurological:	xx    Psychiatric:	x    Hematology/Lymphatics:x	    Endocrine:	x    Allergic/Immunologic:	x    MEDICATIONS  (STANDING):  enoxaparin Injectable 40 milliGRAM(s) SubCutaneous every 24 hours  insulin lispro (HumaLOG) corrective regimen sliding scale   SubCutaneous three times a day before meals  insulin lispro (HumaLOG) corrective regimen sliding scale   SubCutaneous at bedtime  dextrose 50% Injectable 12.5 Gram(s) IV Push once  dextrose 50% Injectable 25 Gram(s) IV Push once  dextrose 50% Injectable 25 Gram(s) IV Push once  aspirin enteric coated 81 milliGRAM(s) Oral daily  atorvastatin 40 milliGRAM(s) Oral at bedtime  carvedilol 12.5 milliGRAM(s) Oral every 12 hours  furosemide    Tablet 20 milliGRAM(s) Oral daily  losartan 100 milliGRAM(s) Oral daily  gabapentin 300 milliGRAM(s) Oral daily  nortriptyline 25 milliGRAM(s) Oral daily  amLODIPine   Tablet 5 milliGRAM(s) Oral daily  levoFLOXacin  Tablet 750 milliGRAM(s) Oral every 24 hours  magnesium oxide 400 milliGRAM(s) Oral three times a day with meals    MEDICATIONS  (PRN):  dextrose Gel 1 Dose(s) Oral once PRN Blood Glucose LESS THAN 70 milliGRAM(s)/deciliter  glucagon  Injectable 1 milliGRAM(s) IntraMuscular once PRN Glucose LESS THAN 70 milligrams/deciliter  ALBUTerol/ipratropium for Nebulization 3 milliLiter(s) Nebulizer every 6 hours PRN Shortness of Breath and/or Wheezing       Vital Signs Last 24 Hrs  T(C): 36.7 (11 Jul 2017 05:47), Max: 36.8 (10 Jul 2017 13:51)  T(F): 98 (11 Jul 2017 05:47), Max: 98.2 (10 Jul 2017 13:51)  HR: 66 (11 Jul 2017 05:47) (66 - 98)  BP: 138/64 (11 Jul 2017 05:47) (138/64 - 146/74)  BP(mean): --  RR: 18 (11 Jul 2017 05:47) (18 - 18)  SpO2: 100% (11 Jul 2017 05:47) (99% - 100%)        I&O's Summary    10 Jul 2017 07:01  -  11 Jul 2017 07:00  --------------------------------------------------------  IN: 560 mL / OUT: 0 mL / NET: 560 mL        Physical Exam:   GENERAL: NAD, well-groomed, well-developed  HEENT: TEA/   Atraumatic, Normocephalic  ENMT: No tonsillar erythema, exudates, or enlargement; Moist mucous membranes, Good dentition, No lesions  NECK: Supple, No JVD, Normal thyroid  CHEST/LUNG: poor air entry bilateral   CVS: Regular rate and rhythm; No murmurs, rubs, or gallops  GI: : Soft, Nontender, Nondistended; Bowel sounds present  NERVOUS SYSTEM:  Alert & Oriented X3  EXTREMITIES:  2+ Peripheral Pulses, No clubbing, cyanosis, or edema  LYMPH: No lymphadenopathy noted  SKIN: No rashes or lesions  ENDOCRINOLOGY: No Thyromegaly  PSYCH: Appropriate    Labs:    CARDIAC MARKERS ( 09 Jul 2017 12:15 )  x     / < 0.06 ng/mL / 65 u/L / 2.19 ng/mL / x                           12.0   7.10  )-----------( 128      ( 11 Jul 2017 07:33 )             37.0                         11.9   8.34  )-----------( 150      ( 10 Jul 2017 11:53 )             36.3                         11.6   8.89  )-----------( 137      ( 09 Jul 2017 05:07 )             35.7     07-11    141  |  100  |  23  ----------------------------<  120<H>  4.2   |  23  |  1.32<H>  07-10    142  |  100  |  21  ----------------------------<  134<H>  3.5   |  27  |  1.26  07-09    144  |  102  |  24<H>  ----------------------------<  131<H>  4.2   |  26  |  1.26    Ca    9.6      11 Jul 2017 07:33  Ca    9.7      10 Jul 2017 11:53  Phos  3.5     07-10  Mg     1.9     07-11  Mg     1.7     07-10    TPro  7.5  /  Alb  3.6  /  TBili  0.4  /  DBili  x   /  AST  13  /  ALT  10  /  AlkPhos  77  07-10  TPro  7.4  /  Alb  3.6  /  TBili  0.2  /  DBili  x   /  AST  14  /  ALT  10  /  AlkPhos  76  07-09    CAPILLARY BLOOD GLUCOSE  135 (11 Jul 2017 08:28)  121 (10 Jul 2017 22:20)  110 (10 Jul 2017 17:15)  94 (10 Jul 2017 11:50)        LIVER FUNCTIONS - ( 10 Jul 2017 11:53 )  Alb: 3.6 g/dL / Pro: 7.5 g/dL / ALK PHOS: 77 u/L / ALT: 10 u/L / AST: 13 u/L / GGT: x               D DImer  Serum Pro-Brain Natriuretic Peptide: 651.0 pg/mL (07-09 @ 05:07)    Cultures:       Studies  Chest X-RAY  CT SCAN Chest   < from: CT Angio Chest w/ IV Cont (07.09.17 @ 18:21) >    FINDINGS:    CHEST:     LUNGS AND LARGE AIRWAYS: Occlusion of the left upper lobe and left lower   lobe bronchi. More peripheral left lower lobe mucus plugging. No   pulmonary nodules.  PLEURA: No pleural effusion.  VESSELS: No evidence for pulmonary embolus. Decreased filling of the left   lower lobe pulmonary arteries is likely due to regional perfusion   differences.  HEART: Cardiomegaly. Coronary artery bypass grafting. No pericardial   effusion.  MEDIASTINUM AND JILLIAN: No lymphadenopathy.  CHEST WALL AND LOWER NECK: Within normal limits.  VISUALIZED UPPER ABDOMEN: Left adrenal 3.6 cm myelolipoma.  BONES: Within normal limits.    IMPRESSION: Occlusion of the left upper lobe and left lower lobe bronchi.   Differential diagnosis is mucous plug versus neoplasm.    Findings are discussed with BLAIRE Luo on 7/10/2017 8:44 AM with read   back.    < end of copied text >  CT Abdomen  Venous Dopplers: LE:   Others      < from: CT Head w/Cont (05.16.16 @ 20:04) >  EXAM:  CT HEAD WITH CONTRAST        PROCEDURE DATE:  May 16 2016     INTERPRETATION:  CLINICAL INFORMATION: 70-year-old male with metastatic   prostate cancer presenting with vertigo. Evaluate for brain metastases.    Description: A contrast-enhanced head CT was performed. 5 mm axial images   were performed from the skull base to the vertex.    COMPARISON: Noncontrast CT head on the same day at 5:05 PM.    FINDINGS:  There is no abnormal enhancement.    There is no CT evidence for acute territorial infarct. No acute   hemorrhage, mass effect, or hydrocephalus.    Chronic microvascular ischemic disease and age-related cerebral volume   loss.    Mild ethmoid air cell mucosal thickening. No lytic or blastic calvarial   lesions.    IMPRESSION: No abnormal enhancement to suggest metastatic disease.    < end of copied text >

## 2017-07-11 NOTE — CONSULT NOTE ADULT - SUBJECTIVE AND OBJECTIVE BOX
70 yo male PMHx of CAD, CABG, HLD, HTN, Prostate Cancer (on maintenance po med) and recent Pneumonia presents with recurrent non-productive cough and SOB for 3 days. SOB described as dyspnea on exertion, orthopnea and b/l LE edema. Cough is non-productive x 3 weeks. Pt reports he was treated for PNA 2 weeks ago, finished 5 days ago and since symptoms persisted he was put back on Levaquin again.  CT Scan showing occlusion of left upper lobe and left lower lobe bronchi.       Vital Signs Last 24 Hrs  T(C): 36.9 (11 Jul 2017 17:01), Max: 36.9 (11 Jul 2017 17:01)  T(F): 98.5 (11 Jul 2017 17:01), Max: 98.5 (11 Jul 2017 17:01)  HR: 101 (11 Jul 2017 17:01) (66 - 101)  BP: 155/68 (11 Jul 2017 17:01) (138/64 - 155/68)  BP(mean): --  RR: 18 (11 Jul 2017 17:01) (18 - 18)  SpO2: 100% (11 Jul 2017 17:01) (100% - 100%)    PAST MEDICAL & SURGICAL HISTORY:  HTN (hypertension)  Prostate cancer  Hyperlipidemia    Diabetes mellitus, type 2  CAD (coronary artery disease)  History of cholecystectomy  H/O coronary artery bypass surgery    MEDICATIONS  (STANDING):  enoxaparin Injectable 40 milliGRAM(s) SubCutaneous every 24 hours  insulin lispro (HumaLOG) corrective regimen sliding scale   SubCutaneous three times a day before meals  insulin lispro (HumaLOG) corrective regimen sliding scale   SubCutaneous at bedtime  dextrose 50% Injectable 12.5 Gram(s) IV Push once  dextrose 50% Injectable 25 Gram(s) IV Push once  dextrose 50% Injectable 25 Gram(s) IV Push once  aspirin enteric coated 81 milliGRAM(s) Oral daily  atorvastatin 40 milliGRAM(s) Oral at bedtime  carvedilol 12.5 milliGRAM(s) Oral every 12 hours  furosemide    Tablet 20 milliGRAM(s) Oral daily  losartan 100 milliGRAM(s) Oral daily  gabapentin 300 milliGRAM(s) Oral daily  nortriptyline 25 milliGRAM(s) Oral daily  amLODIPine   Tablet 5 milliGRAM(s) Oral daily  levoFLOXacin  Tablet 750 milliGRAM(s) Oral every 24 hours    REVIEW OF SYSTEMS:    CONSTITUTIONAL: No weakness, fevers or chills  EYES/ENT: No visual changes;  No vertigo or throat pain   NECK: No pain or stiffness  RESPIRATORY: cough, +shortness of breath   CARDIOVASCULAR: No chest pain or palpitations  GASTROINTESTINAL: No abdominal or epigastric pain. No nausea, vomiting, or hematemesis; No diarrhea or constipation. No melena or hematochezia.  GENITOURINARY: No dysuria, frequency or hematuria  NEUROLOGICAL: No numbness or weakness  SKIN: No itching, burning, rashes, or lesions   All other review of systems is negative unless indicated above.    Physical Exam:   General: WN/WD NAD  Neurology: A&Ox3, nonfocal, MCLAIN x 4  Eyes: PERRLA/ EOMI, Gross vision intact  ENT/Neck: Neck supple, trachea midline, No JVD, Gross hearing intact  Respiratory: Decreased throughout   CV: RRR, S1S2, no murmurs, rubs or gallops  Abdominal: Soft, NT, ND +BS,   Extremities: No edema, + peripheral pulses  Skin: No Rashes, Hematoma, Ecchymosis

## 2017-07-11 NOTE — CONSULT NOTE ADULT - SUBJECTIVE AND OBJECTIVE BOX
Patient is a 71y old  Male who presents for dental clearance for bronchoscopy with mobile tooth.       HPI: Pt reported that tooth #4 was having pain a week ago. Pt. reports that he noticed that it was mobile since last week.       PAST MEDICAL & SURGICAL HISTORY:  HTN (hypertension)  Prostate cancer  Hyperlipidemia  Neuropathy  Diabetes mellitus, type 2  CAD (coronary artery disease)  History of cholecystectomy  H/O coronary artery bypass surgery      MEDICATIONS  (STANDING):  enoxaparin Injectable 40 milliGRAM(s) SubCutaneous every 24 hours  insulin lispro (HumaLOG) corrective regimen sliding scale   SubCutaneous three times a day before meals  insulin lispro (HumaLOG) corrective regimen sliding scale   SubCutaneous at bedtime  dextrose 50% Injectable 12.5 Gram(s) IV Push once  dextrose 50% Injectable 25 Gram(s) IV Push once  dextrose 50% Injectable 25 Gram(s) IV Push once  aspirin enteric coated 81 milliGRAM(s) Oral daily  atorvastatin 40 milliGRAM(s) Oral at bedtime  carvedilol 12.5 milliGRAM(s) Oral every 12 hours  furosemide    Tablet 20 milliGRAM(s) Oral daily  losartan 100 milliGRAM(s) Oral daily  gabapentin 300 milliGRAM(s) Oral daily  nortriptyline 25 milliGRAM(s) Oral daily  amLODIPine   Tablet 5 milliGRAM(s) Oral daily  levoFLOXacin  Tablet 750 milliGRAM(s) Oral every 24 hours  magnesium oxide 400 milliGRAM(s) Oral three times a day with meals    MEDICATIONS  (PRN):  dextrose Gel 1 Dose(s) Oral once PRN Blood Glucose LESS THAN 70 milliGRAM(s)/deciliter  glucagon  Injectable 1 milliGRAM(s) IntraMuscular once PRN Glucose LESS THAN 70 milligrams/deciliter  ALBUTerol/ipratropium for Nebulization 3 milliLiter(s) Nebulizer every 6 hours PRN Shortness of Breath and/or Wheezing      Allergies    No Known Allergies    Intolerances        FAMILY HISTORY:  No pertinent family history in first degree relatives      *SOCIAL HISTORY: (guardian or who pt came with), (smoking hx)    *Last Dental Visit:    Vital Signs Last 24 Hrs  T(C): 36.7 (11 Jul 2017 05:47), Max: 36.7 (11 Jul 2017 05:47)  T(F): 98 (11 Jul 2017 05:47), Max: 98 (11 Jul 2017 05:47)  HR: 66 (11 Jul 2017 05:47) (66 - 98)  BP: 138/64 (11 Jul 2017 05:47) (138/64 - 146/74)  BP(mean): --  RR: 18 (11 Jul 2017 05:47) (18 - 18)  SpO2: 100% (11 Jul 2017 05:47) (99% - 100%)    EOE:  TMJ ( -  ) clicks                    ( -   ) pops                    (  -  ) crepitus             Mandible FROM             Facial bones and MOM <<grossly intact>>             ( -  ) trismus             ( -  ) LAD             ( -  ) swelling             ( -  ) asymmetry             ( -  ) palpation             ( -  ) SOB             ( -  ) dysphagia    IOE:  permanent dentition: multiple missing teeth           hard/soft palate:  No pathology noted           tongue/FOM No pathology noted           labial/buccal mucosa No pathology noted           ( -  ) percussion           ( +  ) palpation - around the buccal gingiva           (  - ) swelling     Dentition present: missing teeth - 1,14,16,19,30  Mobility: 4 - root tip with buccal shell/crown (portion crown had mobility of 3)  Multiple carious teeth  LABS:                        12.0   7.10  )-----------( 128      ( 11 Jul 2017 07:33 )             37.0     07-11    141  |  100  |  23  ----------------------------<  120<H>  4.2   |  23  |  1.32<H>    Ca    9.6      11 Jul 2017 07:33  Phos  3.5     07-10  Mg     1.9     07-11    TPro  7.5  /  Alb  3.6  /  TBili  0.4  /  DBili  x   /  AST  13  /  ALT  10  /  AlkPhos  77  07-10    WBC Count: 7.10 K/uL [3.8 - 10.5] (07-11 @ 07:33)  Platelet Count - Automated: 128 K/uL <L> [150 - 400] (07-11 @ 07:33)  WBC Count: 8.34 K/uL [3.8 - 10.5] (07-10 @ 11:53)  Platelet Count - Automated: 150 K/uL [150 - 400] (07-10 @ 11:53)  WBC Count: 8.89 K/uL [3.8 - 10.5] (07-09 @ 05:07)  Platelet Count - Automated: 137 K/uL <L> [150 - 400] (07-09 @ 05:07)        *DENTAL RADIOGRAPHS:     RADIOLOGY & ADDITIONAL STUDIES:    ASSESSMENT:    PROCEDURE:  Verbal <<and written>> consent given.     RECOMMENDATIONS:  1) <<   >>  2) Dental F/U with outpatient dentist for comprehensive dental care.   3) If any difficulty swallowing/breathing, fever occur, page dental.     Resident Name, pager # Patient is a 71y old  Male who presents for dental clearance for bronchoscopy with mobile tooth.       HPI: Pt reported that tooth #4 was having pain a week ago. Pt. reports that he noticed that it was mobile since last week.       PAST MEDICAL & SURGICAL HISTORY:  HTN (hypertension)  Prostate cancer  Hyperlipidemia  Neuropathy  Diabetes mellitus, type 2  CAD (coronary artery disease)  History of cholecystectomy  H/O coronary artery bypass surgery      MEDICATIONS  (STANDING):  enoxaparin Injectable 40 milliGRAM(s) SubCutaneous every 24 hours  insulin lispro (HumaLOG) corrective regimen sliding scale   SubCutaneous three times a day before meals  insulin lispro (HumaLOG) corrective regimen sliding scale   SubCutaneous at bedtime  dextrose 50% Injectable 12.5 Gram(s) IV Push once  dextrose 50% Injectable 25 Gram(s) IV Push once  dextrose 50% Injectable 25 Gram(s) IV Push once  aspirin enteric coated 81 milliGRAM(s) Oral daily  atorvastatin 40 milliGRAM(s) Oral at bedtime  carvedilol 12.5 milliGRAM(s) Oral every 12 hours  furosemide    Tablet 20 milliGRAM(s) Oral daily  losartan 100 milliGRAM(s) Oral daily  gabapentin 300 milliGRAM(s) Oral daily  nortriptyline 25 milliGRAM(s) Oral daily  amLODIPine   Tablet 5 milliGRAM(s) Oral daily  levoFLOXacin  Tablet 750 milliGRAM(s) Oral every 24 hours  magnesium oxide 400 milliGRAM(s) Oral three times a day with meals    MEDICATIONS  (PRN):  dextrose Gel 1 Dose(s) Oral once PRN Blood Glucose LESS THAN 70 milliGRAM(s)/deciliter  glucagon  Injectable 1 milliGRAM(s) IntraMuscular once PRN Glucose LESS THAN 70 milligrams/deciliter  ALBUTerol/ipratropium for Nebulization 3 milliLiter(s) Nebulizer every 6 hours PRN Shortness of Breath and/or Wheezing      Allergies    No Known Allergies    Intolerances        FAMILY HISTORY:  No pertinent family history in first degree relatives      *SOCIAL HISTORY: (guardian or who pt came with), (smoking hx)    Vital Signs Last 24 Hrs  T(C): 36.7 (11 Jul 2017 05:47), Max: 36.7 (11 Jul 2017 05:47)  T(F): 98 (11 Jul 2017 05:47), Max: 98 (11 Jul 2017 05:47)  HR: 66 (11 Jul 2017 05:47) (66 - 98)  BP: 138/64 (11 Jul 2017 05:47) (138/64 - 146/74)  BP(mean): --  RR: 18 (11 Jul 2017 05:47) (18 - 18)  SpO2: 100% (11 Jul 2017 05:47) (99% - 100%)    EOE:  TMJ ( -  ) clicks                    ( -   ) pops                    (  -  ) crepitus             Mandible FROM             Facial bones and MOM <<grossly intact>>             ( -  ) trismus             ( -  ) LAD             ( -  ) swelling             ( -  ) asymmetry             ( -  ) palpation             ( -  ) SOB             ( -  ) dysphagia    IOE:  permanent dentition: multiple missing teeth           hard/soft palate:  No pathology noted           tongue/FOM No pathology noted           labial/buccal mucosa No pathology noted           ( -  ) percussion           ( +  ) palpation - around the buccal gingiva           (  - ) swelling     Dentition present: missing teeth - 1,14,16,19,30  Mobility: 4 - root tip with buccal shell/crown (portion crown had mobility of 3)  Multiple carious teeth  LABS:                        12.0   7.10  )-----------( 128      ( 11 Jul 2017 07:33 )             37.0     07-11    141  |  100  |  23  ----------------------------<  120<H>  4.2   |  23  |  1.32<H>    Ca    9.6      11 Jul 2017 07:33  Phos  3.5     07-10  Mg     1.9     07-11    TPro  7.5  /  Alb  3.6  /  TBili  0.4  /  DBili  x   /  AST  13  /  ALT  10  /  AlkPhos  77  07-10    WBC Count: 7.10 K/uL [3.8 - 10.5] (07-11 @ 07:33)  Platelet Count - Automated: 128 K/uL <L> [150 - 400] (07-11 @ 07:33)  WBC Count: 8.34 K/uL [3.8 - 10.5] (07-10 @ 11:53)  Platelet Count - Automated: 150 K/uL [150 - 400] (07-10 @ 11:53)  WBC Count: 8.89 K/uL [3.8 - 10.5] (07-09 @ 05:07)  Platelet Count - Automated: 137 K/uL <L> [150 - 400] (07-09 @ 05:07)        *DENTAL RADIOGRAPHS: PA taken for tooth #4    ASSESSMENT: Periapical radiolucency on tooth #4.    PROCEDURE:  Verbal and written consent given. Administered 1 carpule of 2% lido 1:100,000 epi through infiltration of tooth #4. Used periosteal, straight elevators and forcep. Fractured tooth was extracted atraumatically. Single 3-0 chromic gut sutured placed. Hemostasis achieved. POIG.    EBL: 5cc    RECOMMENDATIONS:  1) Dental F/U with outpatient dentist for comprehensive dental care.   2) If any difficulty swallowing/breathing, fever occur, contact [facility] dental.     Napoleon Bond DDS #[id number]  Christopher Virgen DDS    Attending: Dr. Rolle

## 2017-07-12 ENCOUNTER — APPOINTMENT (OUTPATIENT)
Dept: THORACIC SURGERY | Facility: HOSPITAL | Age: 72
End: 2017-07-12
Payer: MEDICARE

## 2017-07-12 DIAGNOSIS — N18.1 CHRONIC KIDNEY DISEASE, STAGE 1: ICD-10-CM

## 2017-07-12 DIAGNOSIS — I50.9 HEART FAILURE, UNSPECIFIED: ICD-10-CM

## 2017-07-12 LAB
APTT BLD: 31.7 SEC — SIGNIFICANT CHANGE UP (ref 27.5–37.4)
BLD GP AB SCN SERPL QL: NEGATIVE — SIGNIFICANT CHANGE UP
BUN SERPL-MCNC: 24 MG/DL — HIGH (ref 7–23)
CALCIUM SERPL-MCNC: 9.6 MG/DL — SIGNIFICANT CHANGE UP (ref 8.4–10.5)
CHLORIDE SERPL-SCNC: 101 MMOL/L — SIGNIFICANT CHANGE UP (ref 98–107)
CO2 SERPL-SCNC: 28 MMOL/L — SIGNIFICANT CHANGE UP (ref 22–31)
CREAT SERPL-MCNC: 1.31 MG/DL — HIGH (ref 0.5–1.3)
GLUCOSE SERPL-MCNC: 129 MG/DL — HIGH (ref 70–99)
HCT VFR BLD CALC: 36.9 % — LOW (ref 39–50)
HGB BLD-MCNC: 12.1 G/DL — LOW (ref 13–17)
INR BLD: 1.14 — SIGNIFICANT CHANGE UP (ref 0.88–1.17)
MAGNESIUM SERPL-MCNC: 2 MG/DL — SIGNIFICANT CHANGE UP (ref 1.6–2.6)
MCHC RBC-ENTMCNC: 29.6 PG — SIGNIFICANT CHANGE UP (ref 27–34)
MCHC RBC-ENTMCNC: 32.8 % — SIGNIFICANT CHANGE UP (ref 32–36)
MCV RBC AUTO: 90.2 FL — SIGNIFICANT CHANGE UP (ref 80–100)
NRBC # FLD: 0 — SIGNIFICANT CHANGE UP
PLATELET # BLD AUTO: 148 K/UL — LOW (ref 150–400)
PMV BLD: 12.7 FL — SIGNIFICANT CHANGE UP (ref 7–13)
POTASSIUM SERPL-MCNC: 3.8 MMOL/L — SIGNIFICANT CHANGE UP (ref 3.5–5.3)
POTASSIUM SERPL-SCNC: 3.8 MMOL/L — SIGNIFICANT CHANGE UP (ref 3.5–5.3)
PROTHROM AB SERPL-ACNC: 12.8 SEC — SIGNIFICANT CHANGE UP (ref 9.8–13.1)
RBC # BLD: 4.09 M/UL — LOW (ref 4.2–5.8)
RBC # FLD: 13.1 % — SIGNIFICANT CHANGE UP (ref 10.3–14.5)
RH IG SCN BLD-IMP: NEGATIVE — SIGNIFICANT CHANGE UP
SODIUM SERPL-SCNC: 142 MMOL/L — SIGNIFICANT CHANGE UP (ref 135–145)
WBC # BLD: 7.51 K/UL — SIGNIFICANT CHANGE UP (ref 3.8–10.5)
WBC # FLD AUTO: 7.51 K/UL — SIGNIFICANT CHANGE UP (ref 3.8–10.5)

## 2017-07-12 PROCEDURE — 31622 DX BRONCHOSCOPE/WASH: CPT

## 2017-07-12 PROCEDURE — 31624 DX BRONCHOSCOPE/LAVAGE: CPT

## 2017-07-12 RX ADMIN — CARVEDILOL PHOSPHATE 12.5 MILLIGRAM(S): 80 CAPSULE, EXTENDED RELEASE ORAL at 23:46

## 2017-07-12 RX ADMIN — NORTRIPTYLINE HYDROCHLORIDE 25 MILLIGRAM(S): 10 CAPSULE ORAL at 23:45

## 2017-07-12 NOTE — CONSULT NOTE ADULT - PROBLEM SELECTOR RECOMMENDATION 4
,obtain ECHO to evaluate LVEF,cardiology consult,continue current managmnet,O2 supply,anticoagulation plan as per cardiology consult
Continue current care

## 2017-07-12 NOTE — CONSULT NOTE ADULT - CONSULT REASON
For bronchoscopy
Dental Clearance for Bronchoscopy with Mobile tooth
Prostate Cancer
ckd and zehra
mucus plug versus malignancy
sob

## 2017-07-12 NOTE — CONSULT NOTE ADULT - PROBLEM SELECTOR RECOMMENDATION 3
DIABETES:   ok control, continue current therapy.  [Control is  adequate   ].   Encourage weight loss.
oncology following

## 2017-07-12 NOTE — CONSULT NOTE ADULT - ASSESSMENT
71 year old male with shortness of breath and cough, treated for pneumonia with antibiotics with no symptom relief.
70 yo male PMHx of CAD, CABG, HLD, HTN, Prostate Cancer (on maintenance po med) and recent Pneumonia presents with recurrent non-productive cough and SOB for 3 days, admitted to tele for SOB, r/o ACS.
70yo M with Prostate cancer, probably metastatic p/w SOB and cough. Patient is on Xtandi and Leupron.
sob/cough  abnormal CT, fu with pulm for evaluation , cont anbx for ?PNA    CAD history of cabg  asa  statin    NSVT on tele  check echo  no evidence of AMI  cont bb
70 yo male PMHx of CAD, CABG, HLD, HTN, Prostate Cancer (on maintenance po med) and recent Pneumonia presents with recurrent non-productive cough and SOB for 3 days. SOB described as dyspnea on exertion, orthopnea and b/l LE edema. Cough is non-productive x 3 weeks now with increase bun and cr

## 2017-07-12 NOTE — CONSULT NOTE ADULT - CONSULT REQUESTED DATE/TIME
12-Jul-2017 21:06
09-Jul-2017 15:00
10-Jul-2017 19:07
11-Jul-2017 11:10
11-Jul-2017 16:52
11-Jul-2017 20:00

## 2017-07-12 NOTE — CONSULT NOTE ADULT - PROBLEM SELECTOR RECOMMENDATION 9
CHRONIC KIDNEY DISEASE, STAGE 1: increase water intake   Serum creatinine is increased  approximating a GFR of decreased  ml/min.   There is no progression.  No uremic symptoms. No evidence of  worsening  Anemia. Fluid status stable.   Will continue to avoid nephrotoxic drugs.  Patient remains asymptomatic.  Continue current therapy.

## 2017-07-12 NOTE — CONSULT NOTE ADULT - PROBLEM SELECTOR RECOMMENDATION 2
BP monitoring,continue current antihypertensive meds, low salt diet,followup with PMD in 1-2 weeks
continue current treatment

## 2017-07-12 NOTE — CONSULT NOTE ADULT - SUBJECTIVE AND OBJECTIVE BOX
Patient is a 71y Male whom presented to the hospital with     PAST MEDICAL & SURGICAL HISTORY:  HTN (hypertension)  Prostate cancer  Hyperlipidemia  Neuropathy  Diabetes mellitus, type 2  CAD (coronary artery disease)  History of cholecystectomy  H/O coronary artery bypass surgery      MEDICATIONS  (STANDING):  enoxaparin Injectable 40 milliGRAM(s) SubCutaneous every 24 hours  insulin lispro (HumaLOG) corrective regimen sliding scale   SubCutaneous three times a day before meals  insulin lispro (HumaLOG) corrective regimen sliding scale   SubCutaneous at bedtime  dextrose 50% Injectable 12.5 Gram(s) IV Push once  dextrose 50% Injectable 25 Gram(s) IV Push once  dextrose 50% Injectable 25 Gram(s) IV Push once  aspirin enteric coated 81 milliGRAM(s) Oral daily  atorvastatin 40 milliGRAM(s) Oral at bedtime  carvedilol 12.5 milliGRAM(s) Oral every 12 hours  furosemide    Tablet 20 milliGRAM(s) Oral daily  losartan 100 milliGRAM(s) Oral daily  gabapentin 300 milliGRAM(s) Oral daily  nortriptyline 25 milliGRAM(s) Oral daily  amLODIPine   Tablet 5 milliGRAM(s) Oral daily  levoFLOXacin  Tablet 750 milliGRAM(s) Oral every 24 hours      Allergies    No Known Allergies    Intolerances        SOCIAL HISTORY:  Denies ETOh,Smoking,     FAMILY HISTORY:  No pertinent family history in first degree relatives      REVIEW OF SYSTEMS:    CONSTITUTIONAL: No weakness, fevers or chills  EYES/ENT: No visual changes;  No vertigo or throat pain   NECK: No pain or stiffness  RESPIRATORY: No cough, wheezing, hemoptysis; No shortness of breath  CARDIOVASCULAR: No chest pain or palpitations  GASTROINTESTINAL: No abdominal or epigastric pain. No nausea, vomiting,   No diarrhea or constipation. No melena  GENITOURINARY: No dysuria, frequency or hematuria  NEUROLOGICAL: No numbness or weakness  SKIN: No itching, burning, rashes, or lesions     VITAL:  T(C): , Max: 37.1 (07-12-17 @ 19:58)  T(F): , Max: 98.8 (07-12-17 @ 19:58)  HR: 99 (07-12-17 @ 20:37)  BP: 156/71 (07-12-17 @ 20:37)  BP(mean): --  RR: 16 (07-12-17 @ 20:37)  SpO2: 95% (07-12-17 @ 20:37)  Wt(kg): --    I and O's:    07-11 @ 07:01  -  07-12 @ 07:00  --------------------------------------------------------  IN: 360 mL / OUT: 0 mL / NET: 360 mL    07-12 @ 07:01  -  07-12 @ 21:06  --------------------------------------------------------  IN: 0 mL / OUT: 780 mL / NET: -780 mL      Height (cm): 172.72 (07-12 @ 14:19)  Weight (kg): 99.8 (07-12 @ 14:19)  BMI (kg/m2): 33.5 (07-12 @ 14:19)  BSA (m2): 2.13 (07-12 @ 14:19)    PHYSICAL EXAM:    Constitutional: NAD  Neck: No LAD, No JVD  Respiratory: CTAB  Cardiovascular: S1 and S2  Gastrointestinal: BS+, soft, NT/ND  Extremities: No peripheral edema  Neurological:  no focal deficits  Psychiatric: Normal mood, normal affect  : No Fuller  Skin: No rashes  Access: Not applicable    LABS:                        12.1   7.51  )-----------( 148      ( 12 Jul 2017 07:24 )             36.9     07-12    142  |  101  |  24<H>  ----------------------------<  129<H>  3.8   |  28  |  1.31<H>    Ca    9.6      12 Jul 2017 07:24  Mg     2.0     07-12        Urine Studies:          RADIOLOGY & ADDITIONAL STUDIES:

## 2017-07-13 ENCOUNTER — RESULT REVIEW (OUTPATIENT)
Age: 72
End: 2017-07-13

## 2017-07-13 LAB
GRAM STN SPT: SIGNIFICANT CHANGE UP
SPECIMEN SOURCE: SIGNIFICANT CHANGE UP

## 2017-07-13 PROCEDURE — 88305 TISSUE EXAM BY PATHOLOGIST: CPT | Mod: 26

## 2017-07-13 PROCEDURE — 88112 CYTOPATH CELL ENHANCE TECH: CPT | Mod: 26

## 2017-07-13 PROCEDURE — 93306 TTE W/DOPPLER COMPLETE: CPT | Mod: 26

## 2017-07-13 PROCEDURE — 71010: CPT | Mod: 26

## 2017-07-13 RX ADMIN — ENOXAPARIN SODIUM 40 MILLIGRAM(S): 100 INJECTION SUBCUTANEOUS at 12:35

## 2017-07-13 RX ADMIN — CARVEDILOL PHOSPHATE 12.5 MILLIGRAM(S): 80 CAPSULE, EXTENDED RELEASE ORAL at 05:43

## 2017-07-13 RX ADMIN — CARVEDILOL PHOSPHATE 12.5 MILLIGRAM(S): 80 CAPSULE, EXTENDED RELEASE ORAL at 17:31

## 2017-07-13 RX ADMIN — ATORVASTATIN CALCIUM 40 MILLIGRAM(S): 80 TABLET, FILM COATED ORAL at 21:35

## 2017-07-13 RX ADMIN — AMLODIPINE BESYLATE 5 MILLIGRAM(S): 2.5 TABLET ORAL at 05:42

## 2017-07-13 RX ADMIN — NORTRIPTYLINE HYDROCHLORIDE 25 MILLIGRAM(S): 10 CAPSULE ORAL at 12:35

## 2017-07-13 RX ADMIN — GABAPENTIN 300 MILLIGRAM(S): 400 CAPSULE ORAL at 12:35

## 2017-07-13 RX ADMIN — LOSARTAN POTASSIUM 100 MILLIGRAM(S): 100 TABLET, FILM COATED ORAL at 05:42

## 2017-07-13 RX ADMIN — Medication 20 MILLIGRAM(S): at 05:42

## 2017-07-13 RX ADMIN — Medication 81 MILLIGRAM(S): at 12:35

## 2017-07-14 ENCOUNTER — TRANSCRIPTION ENCOUNTER (OUTPATIENT)
Age: 72
End: 2017-07-14

## 2017-07-14 VITALS
HEART RATE: 87 BPM | OXYGEN SATURATION: 95 % | SYSTOLIC BLOOD PRESSURE: 136 MMHG | DIASTOLIC BLOOD PRESSURE: 64 MMHG | TEMPERATURE: 98 F | RESPIRATION RATE: 18 BRPM

## 2017-07-14 LAB
BACTERIA BLD CULT: SIGNIFICANT CHANGE UP
BACTERIA BLD CULT: SIGNIFICANT CHANGE UP
BASOPHILS # BLD AUTO: 0.05 K/UL — SIGNIFICANT CHANGE UP (ref 0–0.2)
BASOPHILS NFR BLD AUTO: 0.7 % — SIGNIFICANT CHANGE UP (ref 0–2)
BUN SERPL-MCNC: 33 MG/DL — HIGH (ref 7–23)
CALCIUM SERPL-MCNC: 9.4 MG/DL — SIGNIFICANT CHANGE UP (ref 8.4–10.5)
CHLORIDE SERPL-SCNC: 102 MMOL/L — SIGNIFICANT CHANGE UP (ref 98–107)
CO2 SERPL-SCNC: 25 MMOL/L — SIGNIFICANT CHANGE UP (ref 22–31)
CREAT SERPL-MCNC: 1.41 MG/DL — HIGH (ref 0.5–1.3)
EOSINOPHIL # BLD AUTO: 0.12 K/UL — SIGNIFICANT CHANGE UP (ref 0–0.5)
EOSINOPHIL NFR BLD AUTO: 1.6 % — SIGNIFICANT CHANGE UP (ref 0–6)
GLUCOSE SERPL-MCNC: 122 MG/DL — HIGH (ref 70–99)
HCT VFR BLD CALC: 35.1 % — LOW (ref 39–50)
HGB BLD-MCNC: 11.3 G/DL — LOW (ref 13–17)
IMM GRANULOCYTES # BLD AUTO: 0.03 # — SIGNIFICANT CHANGE UP
IMM GRANULOCYTES NFR BLD AUTO: 0.4 % — SIGNIFICANT CHANGE UP (ref 0–1.5)
LYMPHOCYTES # BLD AUTO: 1.61 K/UL — SIGNIFICANT CHANGE UP (ref 1–3.3)
LYMPHOCYTES # BLD AUTO: 21.8 % — SIGNIFICANT CHANGE UP (ref 13–44)
MAGNESIUM SERPL-MCNC: 2 MG/DL — SIGNIFICANT CHANGE UP (ref 1.6–2.6)
MCHC RBC-ENTMCNC: 30.1 PG — SIGNIFICANT CHANGE UP (ref 27–34)
MCHC RBC-ENTMCNC: 32.2 % — SIGNIFICANT CHANGE UP (ref 32–36)
MCV RBC AUTO: 93.6 FL — SIGNIFICANT CHANGE UP (ref 80–100)
MONOCYTES # BLD AUTO: 0.69 K/UL — SIGNIFICANT CHANGE UP (ref 0–0.9)
MONOCYTES NFR BLD AUTO: 9.3 % — SIGNIFICANT CHANGE UP (ref 2–14)
NEUTROPHILS # BLD AUTO: 4.9 K/UL — SIGNIFICANT CHANGE UP (ref 1.8–7.4)
NEUTROPHILS NFR BLD AUTO: 66.2 % — SIGNIFICANT CHANGE UP (ref 43–77)
NON-GYNECOLOGICAL CYTOLOGY STUDY: SIGNIFICANT CHANGE UP
NRBC # FLD: 0 — SIGNIFICANT CHANGE UP
PLATELET # BLD AUTO: 135 K/UL — LOW (ref 150–400)
PMV BLD: 12.5 FL — SIGNIFICANT CHANGE UP (ref 7–13)
POTASSIUM SERPL-MCNC: 3.5 MMOL/L — SIGNIFICANT CHANGE UP (ref 3.5–5.3)
POTASSIUM SERPL-SCNC: 3.5 MMOL/L — SIGNIFICANT CHANGE UP (ref 3.5–5.3)
RBC # BLD: 3.75 M/UL — LOW (ref 4.2–5.8)
RBC # FLD: 13 % — SIGNIFICANT CHANGE UP (ref 10.3–14.5)
SODIUM SERPL-SCNC: 140 MMOL/L — SIGNIFICANT CHANGE UP (ref 135–145)
WBC # BLD: 7.4 K/UL — SIGNIFICANT CHANGE UP (ref 3.8–10.5)
WBC # FLD AUTO: 7.4 K/UL — SIGNIFICANT CHANGE UP (ref 3.8–10.5)

## 2017-07-14 RX ORDER — CIPROFLOXACIN LACTATE 400MG/40ML
1 VIAL (ML) INTRAVENOUS
Qty: 0 | Refills: 0 | COMMUNITY
Start: 2017-07-14

## 2017-07-14 RX ORDER — ASPIRIN/CALCIUM CARB/MAGNESIUM 324 MG
1 TABLET ORAL
Qty: 0 | Refills: 0 | COMMUNITY
Start: 2017-07-14

## 2017-07-14 RX ORDER — CARVEDILOL PHOSPHATE 80 MG/1
1 CAPSULE, EXTENDED RELEASE ORAL
Qty: 0 | Refills: 0 | COMMUNITY
Start: 2017-07-14

## 2017-07-14 RX ORDER — ATORVASTATIN CALCIUM 80 MG/1
1 TABLET, FILM COATED ORAL
Qty: 0 | Refills: 0 | COMMUNITY
Start: 2017-07-14

## 2017-07-14 RX ORDER — LOSARTAN POTASSIUM 100 MG/1
1 TABLET, FILM COATED ORAL
Qty: 0 | Refills: 0 | COMMUNITY
Start: 2017-07-14

## 2017-07-14 RX ORDER — AMLODIPINE BESYLATE 2.5 MG/1
1 TABLET ORAL
Qty: 0 | Refills: 0 | COMMUNITY
Start: 2017-07-14

## 2017-07-14 RX ORDER — FUROSEMIDE 40 MG
1 TABLET ORAL
Qty: 0 | Refills: 0 | COMMUNITY
Start: 2017-07-14

## 2017-07-14 RX ORDER — NORTRIPTYLINE HYDROCHLORIDE 10 MG/1
1 CAPSULE ORAL
Qty: 0 | Refills: 0 | COMMUNITY
Start: 2017-07-14

## 2017-07-14 RX ORDER — ENZALUTAMIDE 80 MG/1
3 TABLET ORAL
Qty: 0 | Refills: 0 | COMMUNITY

## 2017-07-14 RX ORDER — FUROSEMIDE 40 MG
1 TABLET ORAL
Qty: 0 | Refills: 0 | COMMUNITY

## 2017-07-14 RX ADMIN — GABAPENTIN 300 MILLIGRAM(S): 400 CAPSULE ORAL at 11:16

## 2017-07-14 RX ADMIN — LOSARTAN POTASSIUM 100 MILLIGRAM(S): 100 TABLET, FILM COATED ORAL at 05:39

## 2017-07-14 RX ADMIN — Medication 81 MILLIGRAM(S): at 11:16

## 2017-07-14 RX ADMIN — Medication 20 MILLIGRAM(S): at 05:39

## 2017-07-14 RX ADMIN — CARVEDILOL PHOSPHATE 12.5 MILLIGRAM(S): 80 CAPSULE, EXTENDED RELEASE ORAL at 05:39

## 2017-07-14 RX ADMIN — ENOXAPARIN SODIUM 40 MILLIGRAM(S): 100 INJECTION SUBCUTANEOUS at 11:16

## 2017-07-14 RX ADMIN — NORTRIPTYLINE HYDROCHLORIDE 25 MILLIGRAM(S): 10 CAPSULE ORAL at 11:16

## 2017-07-14 RX ADMIN — AMLODIPINE BESYLATE 5 MILLIGRAM(S): 2.5 TABLET ORAL at 05:39

## 2017-07-14 NOTE — DISCHARGE NOTE ADULT - MEDICATION SUMMARY - MEDICATIONS TO TAKE
I will START or STAY ON the medications listed below when I get home from the hospital:    aspirin 81 mg oral delayed release tablet  -- 1 tab(s) by mouth once a day  -- Indication: For CAD (coronary artery disease)    losartan 100 mg oral tablet  -- 1 tab(s) by mouth once a day  -- Indication: For HTN (hypertension)    Neurontin 300 mg oral capsule  -- 1 cap(s) by mouth once a day  -- Indication: For CNS agent     nortriptyline 25 mg oral capsule  -- 1 cap(s) by mouth once a day  -- Indication: For Antidepressant     metformin 500 mg oral tablet  -- 1 tab(s) by mouth 2 times a day  -- Indication: For Diabetes    GlipiZIDE XL 5 mg oral tablet, extended release  -- 1 tab(s) by mouth once a day  -- Indication: For Diabetes     atorvastatin 40 mg oral tablet  -- 1 tab(s) by mouth once a day (at bedtime)  -- Indication: For Hyperlipidemia     carvedilol 12.5 mg oral tablet  -- 1 tab(s) by mouth every 12 hours  -- Indication: For HTN (hypertension)    amLODIPine 5 mg oral tablet  -- 1 tab(s) by mouth once a day  -- Indication: For HTN (hypertension)    furosemide 20 mg oral tablet  -- 1 tab(s) by mouth once a day  -- Indication: For HTN (hypertension)    levoFLOXacin 750 mg oral tablet  -- 1 tab(s) by mouth every 24 hours, Continue for 1 more dose.   -- Indication: For Mucus plug

## 2017-07-14 NOTE — DISCHARGE NOTE ADULT - PROVIDER TOKENS
FREE:[LAST:[Follow-up:],PHONE:[(   )    -],FAX:[(   )    -],ADDRESS:[Follow-up with your Private Medical Doctor within 1 week.]]

## 2017-07-14 NOTE — DISCHARGE NOTE ADULT - CARE PLAN
Principal Discharge DX:	Bronchitis  Goal:	Continue to monitor.  Instructions for follow-up, activity and diet:	s/p flex bronch.   Continue current medications.   On Levaquin for 1 more dose.   Follow-up with your Private Medical Doctor within 1 week.  Secondary Diagnosis:	HTN (hypertension)  Goal:	Reduce blood pressure.  Instructions for follow-up, activity and diet:	Continue Norvasc, Coreg, Lasix.   Monitor your blood pressure.  Secondary Diagnosis:	CAD (coronary artery disease)  Goal:	Prevent progression of disease.  Instructions for follow-up, activity and diet:	Continue ASA, Lipitor.  Secondary Diagnosis:	Diabetes mellitus, type 2  Goal:	Reduce blood glucose.  Instructions for follow-up, activity and diet:	Continue Metformin, current medications.   Monitor your fingersticks.

## 2017-07-14 NOTE — PROGRESS NOTE ADULT - PROBLEM SELECTOR PROBLEM 1
Shortness of breath
Stage 1 chronic kidney disease
Stage 1 chronic kidney disease
Shortness of breath

## 2017-07-14 NOTE — PROGRESS NOTE ADULT - PROBLEM SELECTOR PLAN 4
CONT CURRENT CARE
CONT CURRENT CARE
obtain ECHO to evaluate LVEF,cardiology consult,continue current managmnet,O2 supply,anticoagulation plan as per cardiology consult.
CONT CURRENT CARE
obtain ECHO to evaluate LVEF,cardiology consult,continue current managmnet,O2 supply,anticoagulation plan as per cardiology consult.

## 2017-07-14 NOTE — DISCHARGE NOTE ADULT - PATIENT PORTAL LINK FT
“You can access the [facility] Patient Portal, offered by [facility], by registering with the following website: [email or web address]”

## 2017-07-14 NOTE — PROGRESS NOTE ADULT - ASSESSMENT
sob/cough  abnormal CT, fu with pulm for evaluation , cont anbx for ?PNA    CAD history of cabg  asa  statin    NSVT on tele  check echo  no evidence of AMI  cont bb
70 yo male PMHx of CAD, CABG, HLD, HTN, Prostate Cancer (on maintenance po med) and recent Pneumonia presents with recurrent non-productive cough and SOB for 3 days, admitted to tele for SOB, r/o ACS.
70 yo male PMHx of CAD, CABG, HLD, HTN, Prostate Cancer (on maintenance po med) and recent Pneumonia presents with recurrent non-productive cough and SOB for 3 days, admitted to tele for SOB, r/o ACS.
70 yo male PMHx of CAD, CABG, HLD, HTN, Prostate Cancer (on maintenance po med) and recent Pneumonia presents with recurrent non-productive cough and SOB for 3 days. SOB described as dyspnea on exertion, orthopnea and b/l LE edema. Cough is non-productive x 3 weeks now with increase bun and cr
70 yo male PMHx of CAD, CABG, HLD, HTN, Prostate Cancer (on maintenance po med) and recent Pneumonia presents with recurrent non-productive cough and SOB for 3 days. SOB described as dyspnea on exertion, orthopnea and b/l LE edema. Cough is non-productive x 3 weeks now with increase bun and cr
71 year old male s/p flex bronch.
72 yo male PMHx of CAD, CABG, HLD, HTN, Prostate Cancer (on maintenance po med) and recent Pneumonia presents with recurrent non-productive cough and SOB for 3 days, admitted to tele for SOB, r/o ACS.
72 yo male PMHx of CAD, CABG, HLD, HTN, Prostate Cancer (on maintenance po med) and recent Pneumonia presents with recurrent non-productive cough and SOB for 3 days, admitted to tele for SOB, r/o ACS.      ·  Problem: Shortness of breath.  Plan: tele monitor   CT Chest reviewed  Pulmonary fu,   bronch today  continue asa, coreg, furosemide, atorvastatin  Continue Levaquin PO    ·  Problem: Diabetes mellitus, type 2.  Plan: Daily glucose monitoring  insulin sliding scale  DASH 1800 ADA diet  serum HgA1C.    ·  Problem: Hyperlipidemia.  Plan:    continue statin.       ·  Problem: Prostate cancer.  Plan:Plan: onc fu      ·  Problem: CAD (coronary artery disease).  Plan: tele monitor   continue asa, coreg, furosemide, atorvastatin.
72 yo male PMHx of CAD, CABG, HLD, HTN, Prostate Cancer (on maintenance po med) and recent Pneumonia presents with recurrent non-productive cough and SOB for 3 days, admitted to tele for SOB, r/o ACS.      ·  Problem: Shortness of breath.  Plan: tele monitor   CT Chest reviewed  Pulmonary fu,   bronch today  continue asa, coreg, furosemide, atorvastatin  Continue Levaquin PO    ·  Problem: Diabetes mellitus, type 2.  Plan: Daily glucose monitoring  insulin sliding scale  DASH 1800 ADA diet  serum HgA1C.    ·  Problem: Hyperlipidemia.  Plan:    continue statin.       ·  Problem: Prostate cancer.  Plan:Plan: onc fu      ·  Problem: CAD (coronary artery disease).  Plan: tele monitor   continue asa, coreg, furosemide, atorvastatin.
72 yo male PMHx of CAD, CABG, HLD, HTN, Prostate Cancer (on maintenance po med) and recent Pneumonia presents with recurrent non-productive cough and SOB for 3 days, admitted to tele for SOB, r/o ACS.      ·  Problem: Shortness of breath.  Plan: tele monitor   CT Chest reviewed  Pulmonary fu, frandy needs bronch  continue asa, coreg, furosemide, atorvastatin  Continue Levaquin PO    ·  Problem: Diabetes mellitus, type 2.  Plan: Daily glucose monitoring  insulin sliding scale  DASH 1800 ADA diet  serum HgA1C.    ·  Problem: Hyperlipidemia.  Plan:    continue statin.       ·  Problem: Prostate cancer.  Plan:Plan: onc fu      ·  Problem: CAD (coronary artery disease).  Plan: tele monitor   continue asa, coreg, furosemide, atorvastatin.
72 yo male PMHx of CAD, CABG, HLD, HTN, Prostate Cancer (on maintenance po med) and recent Pneumonia presents with recurrent non-productive cough and SOB for 3 days, admitted to tele for SOB, r/o ACS.      ·  Problem: Shortness of breath.  Plan: tele monitor   CT Chest reviewed  Pulonary fu  continue asa, coreg, furosemide, atorvastatin  Continue Levaquin PO    ·  Problem: Diabetes mellitus, type 2.  Plan: Daily glucose monitoring  insulin sliding scale  DASH 1800 ADA diet  serum HgA1C.    ·  Problem: Hyperlipidemia.  Plan:    continue statin.       ·  Problem: Prostate cancer.  Plan:Plan: onc fu      ·  Problem: CAD (coronary artery disease).  Plan: tele monitor   continue asa, coreg, furosemide, atorvastatin.
sob/cough  abnormal CT, fu with pulm for evaluation , cont anbx for ?PNA  likely needs bronch vs ebus     CAD history of cabg  asa  statin    NSVT on tele  check echo  no evidence of AMI  cont bb
sob/cough  abnormal CT, fu with pulm for evaluation , cont anbx for ?PNA  likely needs bronch vs ebus    CAD history of cabg  asa  statin    NSVT on tele  check echo  no evidence of AMI  cont bb

## 2017-07-14 NOTE — PROGRESS NOTE ADULT - PROBLEM SELECTOR PLAN 3
DIABETES:   ok control, continue current therapy.  [Control is  adequate   ].   Encourage weight loss.
defer to pcp
defer to pcp
DIABETES:   ok control, continue current therapy.  [Control is  adequate   ].   Encourage weight loss.
defer to pcp

## 2017-07-14 NOTE — PROGRESS NOTE ADULT - SUBJECTIVE AND OBJECTIVE BOX
Patient is a 71y old  Male who presents with a chief complaint of SOB (09 Jul 2017 09:42)    pt is doing ok, awaiting bronchoscopy       Any change in ROS:     MEDICATIONS  (STANDING):  enoxaparin Injectable 40 milliGRAM(s) SubCutaneous every 24 hours  insulin lispro (HumaLOG) corrective regimen sliding scale   SubCutaneous three times a day before meals  insulin lispro (HumaLOG) corrective regimen sliding scale   SubCutaneous at bedtime  dextrose 50% Injectable 12.5 Gram(s) IV Push once  dextrose 50% Injectable 25 Gram(s) IV Push once  dextrose 50% Injectable 25 Gram(s) IV Push once  aspirin enteric coated 81 milliGRAM(s) Oral daily  atorvastatin 40 milliGRAM(s) Oral at bedtime  carvedilol 12.5 milliGRAM(s) Oral every 12 hours  furosemide    Tablet 20 milliGRAM(s) Oral daily  losartan 100 milliGRAM(s) Oral daily  gabapentin 300 milliGRAM(s) Oral daily  nortriptyline 25 milliGRAM(s) Oral daily  amLODIPine   Tablet 5 milliGRAM(s) Oral daily  levoFLOXacin  Tablet 750 milliGRAM(s) Oral every 24 hours    MEDICATIONS  (PRN):  dextrose Gel 1 Dose(s) Oral once PRN Blood Glucose LESS THAN 70 milliGRAM(s)/deciliter  glucagon  Injectable 1 milliGRAM(s) IntraMuscular once PRN Glucose LESS THAN 70 milligrams/deciliter  ALBUTerol/ipratropium for Nebulization 3 milliLiter(s) Nebulizer every 6 hours PRN Shortness of Breath and/or Wheezing    Vital Signs Last 24 Hrs  T(C): 36.5 (12 Jul 2017 05:06), Max: 36.9 (11 Jul 2017 17:01)  T(F): 97.7 (12 Jul 2017 05:06), Max: 98.5 (11 Jul 2017 17:01)  HR: 60 (12 Jul 2017 05:06) (60 - 101)  BP: 143/67 (12 Jul 2017 05:06) (137/62 - 155/68)  BP(mean): --  RR: 18 (12 Jul 2017 05:06) (18 - 18)  SpO2: 100% (12 Jul 2017 05:06) (100% - 100%)    I&O's Summary    11 Jul 2017 07:01  -  12 Jul 2017 07:00  --------------------------------------------------------  IN: 360 mL / OUT: 0 mL / NET: 360 mL          Physical Exam:   GENERAL: NAD, well-groomed, well-developed  HEENT: TEA/   Atraumatic, Normocephalic  ENMT: No tonsillar erythema, exudates, or enlargement; Moist mucous membranes, Good dentition, No lesions  NECK: Supple, No JVD, Normal thyroid  CHEST/LUNG: decreasd air entry L>R   CVS: Regular rate and rhythm; No murmurs, rubs, or gallops  GI: : Soft, Nontender, Nondistended; Bowel sounds present  NERVOUS SYSTEM:  Alert & Oriented X3  EXTREMITIES:  2+ Peripheral Pulses, No clubbing, cyanosis, or edema  LYMPH: No lymphadenopathy noted  SKIN: No rashes or lesions  ENDOCRINOLOGY: No Thyromegaly  PSYCH: Appropriate    Labs:                         12.1   7.51  )-----------( 148      ( 12 Jul 2017 07:24 )             36.9                         12.0   7.10  )-----------( 128      ( 11 Jul 2017 07:33 )             37.0                         11.9   8.34  )-----------( 150      ( 10 Jul 2017 11:53 )             36.3                         11.6   8.89  )-----------( 137      ( 09 Jul 2017 05:07 )             35.7     07-12    142  |  101  |  24<H>  ----------------------------<  129<H>  3.8   |  28  |  1.31<H>  07-11    141  |  100  |  23  ----------------------------<  120<H>  4.2   |  23  |  1.32<H>  07-10    142  |  100  |  21  ----------------------------<  134<H>  3.5   |  27  |  1.26  07-09    144  |  102  |  24<H>  ----------------------------<  131<H>  4.2   |  26  |  1.26    Ca    9.6      12 Jul 2017 07:24  Ca    9.6      11 Jul 2017 07:33  Mg     2.0     07-12  Mg     1.9     07-11    TPro  7.5  /  Alb  3.6  /  TBili  0.4  /  DBili  x   /  AST  13  /  ALT  10  /  AlkPhos  77  07-10  TPro  7.4  /  Alb  3.6  /  TBili  0.2  /  DBili  x   /  AST  14  /  ALT  10  /  AlkPhos  76  07-09    CAPILLARY BLOOD GLUCOSE  114 (12 Jul 2017 11:24)  153 (12 Jul 2017 08:28)  95 (11 Jul 2017 22:01)  108 (11 Jul 2017 17:16)            PT/INR - ( 12 Jul 2017 07:24 )   PT: 12.8 SEC;   INR: 1.14          PTT - ( 12 Jul 2017 07:24 )  PTT:31.7 SEC    Cultures:       Studies  Chest X-RAY  CT SCAN Chest < from: CT Angio Chest w/ IV Cont (07.09.17 @ 18:21) >    FINDINGS:    CHEST:     LUNGS AND LARGE AIRWAYS: Occlusion of the left upper lobe and left lower   lobe bronchi. More peripheral left lower lobe mucus plugging. No   pulmonary nodules.  PLEURA: No pleural effusion.  VESSELS: No evidence for pulmonary embolus. Decreased filling of the left   lower lobe pulmonary arteries is likely due to regional perfusion   differences.  HEART: Cardiomegaly. Coronary artery bypass grafting. No pericardial   effusion.  MEDIASTINUM AND JILLIAN: No lymphadenopathy.  CHEST WALL AND LOWER NECK: Within normal limits.  VISUALIZED UPPER ABDOMEN: Left adrenal 3.6 cm myelolipoma.  BONES: Within normal limits.    IMPRESSION: Occlusion of the left upper lobe and left lower lobe bronchi.   Differential diagnosis is mucous plug versus neoplasm.    Findings are discussed with BLAIRE Luo on 7/10/2017 8:44 AM with read   back.    < end of copied text >    Venous Dopplers: LE:   CT Abdomen  Others
Subjective: Patient seen and examined. No new events except as noted.     SUBJECTIVE/ROS:  No chest pain, pos  sob.   The rest of 14 point ROS otherwise reviewed and reported unremarkable.     MEDICATIONS:  MEDICATIONS  (STANDING):  enoxaparin Injectable 40 milliGRAM(s) SubCutaneous every 24 hours  insulin lispro (HumaLOG) corrective regimen sliding scale   SubCutaneous three times a day before meals  insulin lispro (HumaLOG) corrective regimen sliding scale   SubCutaneous at bedtime  dextrose 50% Injectable 12.5 Gram(s) IV Push once  dextrose 50% Injectable 25 Gram(s) IV Push once  dextrose 50% Injectable 25 Gram(s) IV Push once  aspirin enteric coated 81 milliGRAM(s) Oral daily  atorvastatin 40 milliGRAM(s) Oral at bedtime  carvedilol 12.5 milliGRAM(s) Oral every 12 hours  furosemide    Tablet 20 milliGRAM(s) Oral daily  losartan 100 milliGRAM(s) Oral daily  gabapentin 300 milliGRAM(s) Oral daily  nortriptyline 25 milliGRAM(s) Oral daily  amLODIPine   Tablet 5 milliGRAM(s) Oral daily  levoFLOXacin  Tablet 750 milliGRAM(s) Oral every 24 hours      PHYSICAL EXAM:  T(C): 36.7 (07-13-17 @ 13:53), Max: 37.1 (07-12-17 @ 19:58)  HR: 80 (07-13-17 @ 13:53) (75 - 99)  BP: 130/78 (07-13-17 @ 13:53) (130/78 - 162/58)  RR: 18 (07-13-17 @ 13:53) (16 - 23)  SpO2: 98% (07-13-17 @ 13:53) (92% - 99%)  Wt(kg): --  I&O's Summary    12 Jul 2017 07:01  -  13 Jul 2017 07:00  --------------------------------------------------------  IN: 75 mL / OUT: 1380 mL / NET: -1305 mL    13 Jul 2017 07:01  -  13 Jul 2017 16:03  --------------------------------------------------------  IN: 240 mL / OUT: 0 mL / NET: 240 mL          Appearance: Normal	  HEENT:   Normal oral mucosa, PERRL, EOMI	  Cardiovascular: Normal S1 S2,    Murmur:   Neck: JVP normal  Respiratory: Lungs clear to auscultation  Gastrointestinal:  Soft, Non-tender, + BS	  Skin: normal   Neuro: No gross deficits.   Psychiatry:  Mood & affect appropriate  Ext: No edema        LABS:    CARDIAC MARKERS:                                12.1   7.51  )-----------( 148      ( 12 Jul 2017 07:24 )             36.9     07-12    142  |  101  |  24<H>  ----------------------------<  129<H>  3.8   |  28  |  1.31<H>    Ca    9.6      12 Jul 2017 07:24  Mg     2.0     07-12      proBNP:   Lipid Profile:   HgA1c:   TSH:           TELEMETRY: 	    ECG:  	  RADIOLOGY:   DIAGNOSTIC TESTING:  Echocardiogram:  Catheterization:  Stress Test:    OTHER:
Subjective: Patient seen and examined. No new events except as noted.     SUBJECTIVE/ROS:  still with cough , sob      MEDICATIONS:  MEDICATIONS  (STANDING):  enoxaparin Injectable 40 milliGRAM(s) SubCutaneous every 24 hours  insulin lispro (HumaLOG) corrective regimen sliding scale   SubCutaneous three times a day before meals  insulin lispro (HumaLOG) corrective regimen sliding scale   SubCutaneous at bedtime  dextrose 50% Injectable 12.5 Gram(s) IV Push once  dextrose 50% Injectable 25 Gram(s) IV Push once  dextrose 50% Injectable 25 Gram(s) IV Push once  aspirin enteric coated 81 milliGRAM(s) Oral daily  atorvastatin 40 milliGRAM(s) Oral at bedtime  carvedilol 12.5 milliGRAM(s) Oral every 12 hours  furosemide    Tablet 20 milliGRAM(s) Oral daily  losartan 100 milliGRAM(s) Oral daily  gabapentin 300 milliGRAM(s) Oral daily  nortriptyline 25 milliGRAM(s) Oral daily  amLODIPine   Tablet 5 milliGRAM(s) Oral daily  levoFLOXacin  Tablet 750 milliGRAM(s) Oral every 24 hours      PHYSICAL EXAM:  T(C): 36.5 (07-12-17 @ 05:06), Max: 36.9 (07-11-17 @ 17:01)  HR: 60 (07-12-17 @ 05:06) (60 - 101)  BP: 143/67 (07-12-17 @ 05:06) (137/62 - 155/68)  RR: 18 (07-12-17 @ 05:06) (18 - 18)  SpO2: 100% (07-12-17 @ 05:06) (100% - 100%)  Wt(kg): --  I&O's Summary    11 Jul 2017 07:01  -  12 Jul 2017 07:00  --------------------------------------------------------  IN: 360 mL / OUT: 0 mL / NET: 360 mL          Appearance: Normal	  HEENT:   Normal oral mucosa, PERRL, EOMI	  Cardiovascular: Normal S1 S2,    Murmur:   Neck: JVP normal  Respiratory: Lungs few wheezes  Gastrointestinal:  Soft, Non-tender, + BS	  Skin: normal   Neuro: No gross deficits.   Psychiatry:  Mood & affect appropriate  Ext: No edema        LABS:    CARDIAC MARKERS:  CARDIAC MARKERS ( 09 Jul 2017 12:15 )  x     / < 0.06 ng/mL / 65 u/L / 2.19 ng/mL / x                                    12.1   7.51  )-----------( 148      ( 12 Jul 2017 07:24 )             36.9     07-11    141  |  100  |  23  ----------------------------<  120<H>  4.2   |  23  |  1.32<H>    Ca    9.6      11 Jul 2017 07:33  Phos  3.5     07-10  Mg     1.9     07-11    TPro  7.5  /  Alb  3.6  /  TBili  0.4  /  DBili  x   /  AST  13  /  ALT  10  /  AlkPhos  77  07-10    proBNP:   Lipid Profile:   HgA1c:   TSH:           TELEMETRY: 	    ECG:  	  RADIOLOGY:   DIAGNOSTIC TESTING:  Echocardiogram:  Catheterization:  Stress Test:    OTHER:
Subjective: Patient seen and examined. No new events except as noted.   covering Dr flowers    SUBJECTIVE/ROS:        MEDICATIONS:  MEDICATIONS  (STANDING):  enoxaparin Injectable 40 milliGRAM(s) SubCutaneous every 24 hours  insulin lispro (HumaLOG) corrective regimen sliding scale   SubCutaneous three times a day before meals  insulin lispro (HumaLOG) corrective regimen sliding scale   SubCutaneous at bedtime  dextrose 50% Injectable 12.5 Gram(s) IV Push once  dextrose 50% Injectable 25 Gram(s) IV Push once  dextrose 50% Injectable 25 Gram(s) IV Push once  aspirin enteric coated 81 milliGRAM(s) Oral daily  atorvastatin 40 milliGRAM(s) Oral at bedtime  carvedilol 12.5 milliGRAM(s) Oral every 12 hours  furosemide    Tablet 20 milliGRAM(s) Oral daily  losartan 100 milliGRAM(s) Oral daily  gabapentin 300 milliGRAM(s) Oral daily  nortriptyline 25 milliGRAM(s) Oral daily  amLODIPine   Tablet 5 milliGRAM(s) Oral daily  levoFLOXacin  Tablet 750 milliGRAM(s) Oral every 24 hours  enzalutamide 120 milliGRAM(s) Oral daily  magnesium oxide 400 milliGRAM(s) Oral three times a day with meals      PHYSICAL EXAM:  T(C): 36.7 (07-11-17 @ 05:47), Max: 36.8 (07-10-17 @ 13:51)  HR: 66 (07-11-17 @ 05:47) (66 - 98)  BP: 138/64 (07-11-17 @ 05:47) (138/64 - 146/74)  RR: 18 (07-11-17 @ 05:47) (18 - 18)  SpO2: 100% (07-11-17 @ 05:47) (99% - 100%)  Wt(kg): --  I&O's Summary    10 Jul 2017 07:01  -  11 Jul 2017 07:00  --------------------------------------------------------  IN: 560 mL / OUT: 0 mL / NET: 560 mL          Appearance: Normal	  HEENT:   Normal oral mucosa, PERRL, EOMI	  Cardiovascular: Normal S1 S2,    Murmur:   Neck: JVP normal  Respiratory: Lungsfew crackels wheeze   Gastrointestinal:  Soft, Non-tender, + BS	  Skin: normal   Neuro: No gross deficits.   Psychiatry:  Mood & affect appropriate  Ext: No edema        LABS:    CARDIAC MARKERS:  CARDIAC MARKERS ( 09 Jul 2017 12:15 )  x     / < 0.06 ng/mL / 65 u/L / 2.19 ng/mL / x      CARDIAC MARKERS ( 09 Jul 2017 05:07 )  x     / < 0.06 ng/mL / 56 u/L / 2.03 ng/mL / x                                    11.9   8.34  )-----------( 150      ( 10 Jul 2017 11:53 )             36.3     07-10    142  |  100  |  21  ----------------------------<  134<H>  3.5   |  27  |  1.26    Ca    9.7      10 Jul 2017 11:53  Phos  3.5     07-10  Mg     1.7     07-10    TPro  7.5  /  Alb  3.6  /  TBili  0.4  /  DBili  x   /  AST  13  /  ALT  10  /  AlkPhos  77  07-10    proBNP:   Lipid Profile:   HgA1c:   TSH:           TELEMETRY: 	    ECG:  	  RADIOLOGY:   DIAGNOSTIC TESTING:  Echocardiogram:  Catheterization:  Stress Test:    OTHER:
ANESTHESIA POSTOP CHECK    71y Male POSTOP DAY 1 S/P     Vital Signs Last 24 Hrs  T(C): 36.8 (13 Jul 2017 05:38), Max: 37.1 (12 Jul 2017 19:58)  T(F): 98.3 (13 Jul 2017 05:38), Max: 98.8 (12 Jul 2017 19:58)  HR: 85 (13 Jul 2017 05:38) (75 - 115)  BP: 141/76 (13 Jul 2017 05:38) (136/59 - 162/58)  BP(mean): --  RR: 18 (13 Jul 2017 05:38) (16 - 23)  SpO2: 99% (13 Jul 2017 00:55) (92% - 100%)  I&O's Summary    12 Jul 2017 07:01  -  13 Jul 2017 07:00  --------------------------------------------------------  IN: 75 mL / OUT: 1380 mL / NET: -1305 mL    13 Jul 2017 07:01  -  13 Jul 2017 10:52  --------------------------------------------------------  IN: 120 mL / OUT: 0 mL / NET: 120 mL        [x] NO APPARENT ANESTHESIA COMPLICATIONS      Comments:
ANESTHESIA POSTOP CHECK    71y Male POSTOP DAY 1 S/P     Vital Signs Last 24 Hrs  T(C): 36.8 (13 Jul 2017 05:38), Max: 37.1 (12 Jul 2017 19:58)  T(F): 98.3 (13 Jul 2017 05:38), Max: 98.8 (12 Jul 2017 19:58)  HR: 85 (13 Jul 2017 05:38) (75 - 115)  BP: 141/76 (13 Jul 2017 05:38) (136/59 - 162/58)  BP(mean): --  RR: 18 (13 Jul 2017 05:38) (16 - 23)  SpO2: 99% (13 Jul 2017 00:55) (92% - 100%)  I&O's Summary    12 Jul 2017 07:01  -  13 Jul 2017 07:00  --------------------------------------------------------  IN: 75 mL / OUT: 1380 mL / NET: -1305 mL    13 Jul 2017 07:01  -  13 Jul 2017 10:53  --------------------------------------------------------  IN: 120 mL / OUT: 0 mL / NET: 120 mL        [x ] NO APPARENT ANESTHESIA COMPLICATIONS      Comments:
Patient is a 71y Male with past medical history of           presenting with        who reports no complaints overnight.    REVIEW OF SYSTEMS:    CONSTITUTIONAL: No weakness, fevers or chills    Allergies    No Known Allergies    Intolerances        MEDICATIONS  (STANDING):  enoxaparin Injectable 40 milliGRAM(s) SubCutaneous every 24 hours  insulin lispro (HumaLOG) corrective regimen sliding scale   SubCutaneous three times a day before meals  insulin lispro (HumaLOG) corrective regimen sliding scale   SubCutaneous at bedtime  dextrose 50% Injectable 12.5 Gram(s) IV Push once  dextrose 50% Injectable 25 Gram(s) IV Push once  dextrose 50% Injectable 25 Gram(s) IV Push once  aspirin enteric coated 81 milliGRAM(s) Oral daily  atorvastatin 40 milliGRAM(s) Oral at bedtime  carvedilol 12.5 milliGRAM(s) Oral every 12 hours  furosemide    Tablet 20 milliGRAM(s) Oral daily  losartan 100 milliGRAM(s) Oral daily  gabapentin 300 milliGRAM(s) Oral daily  nortriptyline 25 milliGRAM(s) Oral daily  amLODIPine   Tablet 5 milliGRAM(s) Oral daily  levoFLOXacin  Tablet 750 milliGRAM(s) Oral every 24 hours      Vitals:  T(F): 98 (07-13-17 @ 13:53), Max: 98.8 (07-12-17 @ 19:58)  HR: 79 (07-13-17 @ 16:57)  BP: 131/70 (07-13-17 @ 16:57)  BP(mean): --  RR: 18 (07-13-17 @ 13:53)  SpO2: 98% (07-13-17 @ 13:53)  Wt(kg): --    I and O's:    07-12 @ 07:01  -  07-13 @ 07:00  --------------------------------------------------------  IN: 75 mL / OUT: 1380 mL / NET: -1305 mL    07-13 @ 07:01  -  07-13 @ 19:35  --------------------------------------------------------  IN: 240 mL / OUT: 0 mL / NET: 240 mL            PHYSICAL EXAM:    Constitutional: NAD,   Neck: No JVD  Respiratory: CTAB  Cardiovascular: S1 and S2  Gastrointestinal: BS+, soft, NT/ND  Extremities: No peripheral edema  Neurological: , no focal deficits    LABS:                        12.1   7.51  )-----------( 148      ( 12 Jul 2017 07:24 )             36.9       142    |  101    |  24     ----------------------------<  129       12 Jul 2017 07:24  3.8     |  28     |  1.31     141    |  100    |  23     ----------------------------<  120       11 Jul 2017 07:33  4.2     |  23     |  1.32     142    |  100    |  21     ----------------------------<  134       10 Jul 2017 11:53  3.5     |  27     |  1.26     Ca    9.6        12 Jul 2017 07:24  Ca    9.6        11 Jul 2017 07:33    Phos  3.5       10 Jul 2017 11:53    Mg     2.0       12 Jul 2017 07:24  Mg     1.9       11 Jul 2017 07:33    TPro  7.5    /  Alb  3.6    /  TBili  0.4    /        10 Jul 2017 11:53  DBili  x      /  AST  13     /  ALT  10     /  AlkPhos  77           Serum Osmo:   Serum Uric Acid:   BENIGNO:   Double Stranded DNA:   C3:   C3 Nephritic Factor:   C4:   p-ANCA:   c-ANCA:   Anti-GBM:   ANIKA-Smith Antibody:   Immunofixation:   [place]/Lambda Free Light Chain:   SPEP:   Hepatitis Panel:   HIV-1/2:     Urine Studies:      Urine chemistry:   Urine Na:   Urine Creatinine:   Urine Protein/Cr ratio:  Urine K:   Urine Osm:   24 Hr urine studies:     24 hr urine Creatinine Clearance:    RADIOLOGY & ADDITIONAL STUDIES:
Patient is a 71y Male with past medical history of           presenting with        who reports no complaints overnight.    REVIEW OF SYSTEMS:    CONSTITUTIONAL: No weakness, fevers or chills  Allergies    No Known Allergies    Intolerances        MEDICATIONS  (STANDING):  enoxaparin Injectable 40 milliGRAM(s) SubCutaneous every 24 hours  insulin lispro (HumaLOG) corrective regimen sliding scale   SubCutaneous three times a day before meals  insulin lispro (HumaLOG) corrective regimen sliding scale   SubCutaneous at bedtime  dextrose 50% Injectable 12.5 Gram(s) IV Push once  dextrose 50% Injectable 25 Gram(s) IV Push once  dextrose 50% Injectable 25 Gram(s) IV Push once  aspirin enteric coated 81 milliGRAM(s) Oral daily  atorvastatin 40 milliGRAM(s) Oral at bedtime  carvedilol 12.5 milliGRAM(s) Oral every 12 hours  losartan 100 milliGRAM(s) Oral daily  gabapentin 300 milliGRAM(s) Oral daily  nortriptyline 25 milliGRAM(s) Oral daily  amLODIPine   Tablet 5 milliGRAM(s) Oral daily  levoFLOXacin  Tablet 750 milliGRAM(s) Oral every 24 hours      Vitals:  T(F): 97.6 (07-14-17 @ 14:21), Max: 98.3 (07-14-17 @ 05:38)  HR: 87 (07-14-17 @ 14:21)  BP: 136/64 (07-14-17 @ 14:21)  BP(mean): --  RR: 18 (07-14-17 @ 14:21)  SpO2: 95% (07-14-17 @ 14:21)  Wt(kg): --    I and O's:    07-13 @ 07:01  -  07-14 @ 07:00  --------------------------------------------------------  IN: 440 mL / OUT: 0 mL / NET: 440 mL    07-14 @ 07:01  -  07-14 @ 16:04  --------------------------------------------------------  IN: 240 mL / OUT: 0 mL / NET: 240 mL            PHYSICAL EXAM:    Constitutional: NAD,   Neck: No JVD  Respiratory: CTAB  Cardiovascular: S1 and S2  Gastrointestinal: BS+, soft, NT/ND    LABS:                        11.3   7.40  )-----------( 135      ( 14 Jul 2017 06:00 )             35.1       140    |  102    |  33     ----------------------------<  122       14 Jul 2017 06:00  3.5     |  25     |  1.41     142    |  101    |  24     ----------------------------<  129       12 Jul 2017 07:24  3.8     |  28     |  1.31     141    |  100    |  23     ----------------------------<  120       11 Jul 2017 07:33  4.2     |  23     |  1.32     Ca    9.4        14 Jul 2017 06:00  Ca    9.6        12 Jul 2017 07:24      Mg     2.0       14 Jul 2017 06:00  Mg     2.0       12 Jul 2017 07:24        Serum Osmo:   Serum Uric Acid:   BENIGNO:   Double Stranded DNA:   C3:   C3 Nephritic Factor:   C4:   p-ANCA:   c-ANCA:   Anti-GBM:   ANIKA-Smith Antibody:   Immunofixation:   [place]/Lambda Free Light Chain:   SPEP:   Hepatitis Panel:   HIV-1/2:     Urine Studies:      Urine chemistry:   Urine Na:   Urine Creatinine:   Urine Protein/Cr ratio:  Urine K:   Urine Osm:   24 Hr urine studies:     24 hr urine Creatinine Clearance:    RADIOLOGY & ADDITIONAL STUDIES:
Patient is a 71y old  Male who presents with a chief complaint of SOB (09 Jul 2017 09:42)      INTERVAL HPI/OVERNIGHT EVENTS:  T(C): 36.7 (07-11-17 @ 05:47), Max: 36.8 (07-10-17 @ 13:51)  HR: 66 (07-11-17 @ 05:47) (66 - 98)  BP: 138/64 (07-11-17 @ 05:47) (138/64 - 146/74)  RR: 18 (07-11-17 @ 05:47) (18 - 18)  SpO2: 100% (07-11-17 @ 05:47) (99% - 100%)  Wt(kg): --  I&O's Summary    10 Jul 2017 07:01  -  11 Jul 2017 07:00  --------------------------------------------------------  IN: 560 mL / OUT: 0 mL / NET: 560 mL    11 Jul 2017 07:01  -  11 Jul 2017 13:33  --------------------------------------------------------  IN: 120 mL / OUT: 0 mL / NET: 120 mL        LABS:                        12.0   7.10  )-----------( 128      ( 11 Jul 2017 07:33 )             37.0     07-11    141  |  100  |  23  ----------------------------<  120<H>  4.2   |  23  |  1.32<H>    Ca    9.6      11 Jul 2017 07:33  Phos  3.5     07-10  Mg     1.9     07-11    TPro  7.5  /  Alb  3.6  /  TBili  0.4  /  DBili  x   /  AST  13  /  ALT  10  /  AlkPhos  77  07-10        CAPILLARY BLOOD GLUCOSE  105 (11 Jul 2017 11:38)  135 (11 Jul 2017 08:28)  121 (10 Jul 2017 22:20)  110 (10 Jul 2017 17:15)                MEDICATIONS  (STANDING):  enoxaparin Injectable 40 milliGRAM(s) SubCutaneous every 24 hours  insulin lispro (HumaLOG) corrective regimen sliding scale   SubCutaneous three times a day before meals  insulin lispro (HumaLOG) corrective regimen sliding scale   SubCutaneous at bedtime  dextrose 50% Injectable 12.5 Gram(s) IV Push once  dextrose 50% Injectable 25 Gram(s) IV Push once  dextrose 50% Injectable 25 Gram(s) IV Push once  aspirin enteric coated 81 milliGRAM(s) Oral daily  atorvastatin 40 milliGRAM(s) Oral at bedtime  carvedilol 12.5 milliGRAM(s) Oral every 12 hours  furosemide    Tablet 20 milliGRAM(s) Oral daily  losartan 100 milliGRAM(s) Oral daily  gabapentin 300 milliGRAM(s) Oral daily  nortriptyline 25 milliGRAM(s) Oral daily  amLODIPine   Tablet 5 milliGRAM(s) Oral daily  levoFLOXacin  Tablet 750 milliGRAM(s) Oral every 24 hours  magnesium oxide 400 milliGRAM(s) Oral three times a day with meals    MEDICATIONS  (PRN):  dextrose Gel 1 Dose(s) Oral once PRN Blood Glucose LESS THAN 70 milliGRAM(s)/deciliter  glucagon  Injectable 1 milliGRAM(s) IntraMuscular once PRN Glucose LESS THAN 70 milligrams/deciliter  ALBUTerol/ipratropium for Nebulization 3 milliLiter(s) Nebulizer every 6 hours PRN Shortness of Breath and/or Wheezing          PHYSICAL EXAM:  GENERAL: NAD, well-groomed, well-developed  HEAD:  Atraumatic, Normocephalic  CHEST/LUNG: Clear to percussion bilaterally; No rales, rhonchi, wheezing, or rubs  HEART: Regular rate and rhythm; No murmurs, rubs, or gallops  ABDOMEN: Soft, Nontender, Nondistended; Bowel sounds present  EXTREMITIES:  2+ Peripheral Pulses, No clubbing, cyanosis, or edema  LYMPH: No lymphadenopathy noted  SKIN: No rashes or lesions    Care Discussed with Consultants/Other Providers [* ] YES  [ ] NO
Patient is a 71y old  Male who presents with a chief complaint of SOB (09 Jul 2017 09:42)      INTERVAL HPI/OVERNIGHT EVENTS:  T(C): 36.7 (07-13-17 @ 12:43), Max: 37.1 (07-12-17 @ 19:58)  HR: 80 (07-13-17 @ 12:43) (75 - 115)  BP: 140/67 (07-13-17 @ 12:43) (136/59 - 162/58)  RR: 18 (07-13-17 @ 12:43) (16 - 23)  SpO2: 98% (07-13-17 @ 12:43) (92% - 100%)  Wt(kg): --  I&O's Summary    12 Jul 2017 07:01  -  13 Jul 2017 07:00  --------------------------------------------------------  IN: 75 mL / OUT: 1380 mL / NET: -1305 mL    13 Jul 2017 07:01  -  13 Jul 2017 12:54  --------------------------------------------------------  IN: 120 mL / OUT: 0 mL / NET: 120 mL        LABS:                        12.1   7.51  )-----------( 148      ( 12 Jul 2017 07:24 )             36.9     07-12    142  |  101  |  24<H>  ----------------------------<  129<H>  3.8   |  28  |  1.31<H>    Ca    9.6      12 Jul 2017 07:24  Mg     2.0     07-12      PT/INR - ( 12 Jul 2017 07:24 )   PT: 12.8 SEC;   INR: 1.14          PTT - ( 12 Jul 2017 07:24 )  PTT:31.7 SEC    CAPILLARY BLOOD GLUCOSE  119 (13 Jul 2017 12:43)  130 (13 Jul 2017 08:29)  145 (12 Jul 2017 23:15)  126 (12 Jul 2017 16:53)                MEDICATIONS  (STANDING):  enoxaparin Injectable 40 milliGRAM(s) SubCutaneous every 24 hours  insulin lispro (HumaLOG) corrective regimen sliding scale   SubCutaneous three times a day before meals  insulin lispro (HumaLOG) corrective regimen sliding scale   SubCutaneous at bedtime  dextrose 50% Injectable 12.5 Gram(s) IV Push once  dextrose 50% Injectable 25 Gram(s) IV Push once  dextrose 50% Injectable 25 Gram(s) IV Push once  aspirin enteric coated 81 milliGRAM(s) Oral daily  atorvastatin 40 milliGRAM(s) Oral at bedtime  carvedilol 12.5 milliGRAM(s) Oral every 12 hours  furosemide    Tablet 20 milliGRAM(s) Oral daily  losartan 100 milliGRAM(s) Oral daily  gabapentin 300 milliGRAM(s) Oral daily  nortriptyline 25 milliGRAM(s) Oral daily  amLODIPine   Tablet 5 milliGRAM(s) Oral daily  levoFLOXacin  Tablet 750 milliGRAM(s) Oral every 24 hours    MEDICATIONS  (PRN):  dextrose Gel 1 Dose(s) Oral once PRN Blood Glucose LESS THAN 70 milliGRAM(s)/deciliter  glucagon  Injectable 1 milliGRAM(s) IntraMuscular once PRN Glucose LESS THAN 70 milligrams/deciliter  ALBUTerol/ipratropium for Nebulization 3 milliLiter(s) Nebulizer every 6 hours PRN Shortness of Breath and/or Wheezing          PHYSICAL EXAM:  GENERAL: NAD, well-groomed, well-developed  HEAD:  Atraumatic, Normocephalic  CHEST/LUNG: Clear to percussion bilaterally; No rales, rhonchi, wheezing, or rubs  HEART: Regular rate and rhythm; No murmurs, rubs, or gallops  ABDOMEN: Soft, Nontender, Nondistended; Bowel sounds present  EXTREMITIES:  2+ Peripheral Pulses, No clubbing, cyanosis, or edema  LYMPH: No lymphadenopathy noted  SKIN: No rashes or lesions    Care Discussed with Consultants/Other Providers [ ] YES  [ ] NO
Patient is a 71y old  Male who presents with a chief complaint of SOB (09 Jul 2017 09:42)      INTERVAL HPI/OVERNIGHT EVENTS:  T(C): 36.9 (07-12-17 @ 13:13), Max: 36.9 (07-11-17 @ 17:01)  HR: 115 (07-12-17 @ 13:13) (60 - 115)  BP: 136/61 (07-12-17 @ 13:13) (136/61 - 155/68)  RR: 18 (07-12-17 @ 13:13) (18 - 18)  SpO2: 100% (07-12-17 @ 13:13) (100% - 100%)  Wt(kg): --  I&O's Summary    11 Jul 2017 07:01  -  12 Jul 2017 07:00  --------------------------------------------------------  IN: 360 mL / OUT: 0 mL / NET: 360 mL        LABS:                        12.1   7.51  )-----------( 148      ( 12 Jul 2017 07:24 )             36.9     07-12    142  |  101  |  24<H>  ----------------------------<  129<H>  3.8   |  28  |  1.31<H>    Ca    9.6      12 Jul 2017 07:24  Mg     2.0     07-12      PT/INR - ( 12 Jul 2017 07:24 )   PT: 12.8 SEC;   INR: 1.14          PTT - ( 12 Jul 2017 07:24 )  PTT:31.7 SEC    CAPILLARY BLOOD GLUCOSE  114 (12 Jul 2017 11:24)  153 (12 Jul 2017 08:28)  95 (11 Jul 2017 22:01)  108 (11 Jul 2017 17:16)                MEDICATIONS  (STANDING):  enoxaparin Injectable 40 milliGRAM(s) SubCutaneous every 24 hours  insulin lispro (HumaLOG) corrective regimen sliding scale   SubCutaneous three times a day before meals  insulin lispro (HumaLOG) corrective regimen sliding scale   SubCutaneous at bedtime  dextrose 50% Injectable 12.5 Gram(s) IV Push once  dextrose 50% Injectable 25 Gram(s) IV Push once  dextrose 50% Injectable 25 Gram(s) IV Push once  aspirin enteric coated 81 milliGRAM(s) Oral daily  atorvastatin 40 milliGRAM(s) Oral at bedtime  carvedilol 12.5 milliGRAM(s) Oral every 12 hours  furosemide    Tablet 20 milliGRAM(s) Oral daily  losartan 100 milliGRAM(s) Oral daily  gabapentin 300 milliGRAM(s) Oral daily  nortriptyline 25 milliGRAM(s) Oral daily  amLODIPine   Tablet 5 milliGRAM(s) Oral daily  levoFLOXacin  Tablet 750 milliGRAM(s) Oral every 24 hours    MEDICATIONS  (PRN):  dextrose Gel 1 Dose(s) Oral once PRN Blood Glucose LESS THAN 70 milliGRAM(s)/deciliter  glucagon  Injectable 1 milliGRAM(s) IntraMuscular once PRN Glucose LESS THAN 70 milligrams/deciliter  ALBUTerol/ipratropium for Nebulization 3 milliLiter(s) Nebulizer every 6 hours PRN Shortness of Breath and/or Wheezing          PHYSICAL EXAM:  GENERAL: NAD, well-groomed, well-developed  HEAD:  Atraumatic, Normocephalic  CHEST/LUNG: Clear to percussion bilaterally; No rales, rhonchi, wheezing, or rubs  HEART: Regular rate and rhythm; No murmurs, rubs, or gallops  ABDOMEN: Soft, Nontender, Nondistended; Bowel sounds present  EXTREMITIES:  2+ Peripheral Pulses, No clubbing, cyanosis, or edema  LYMPH: No lymphadenopathy noted  SKIN: No rashes or lesions    Care Discussed with Consultants/Other Providers [ ] YES  [ ] NO
Patient is a 71y old  Male who presents with a chief complaint of SOB (09 Jul 2017 09:42)    pt doing ok    more awake and alert and s/p bronchoscopy      Any change in ROS:     MEDICATIONS  (STANDING):  enoxaparin Injectable 40 milliGRAM(s) SubCutaneous every 24 hours  insulin lispro (HumaLOG) corrective regimen sliding scale   SubCutaneous three times a day before meals  insulin lispro (HumaLOG) corrective regimen sliding scale   SubCutaneous at bedtime  dextrose 50% Injectable 12.5 Gram(s) IV Push once  dextrose 50% Injectable 25 Gram(s) IV Push once  dextrose 50% Injectable 25 Gram(s) IV Push once  aspirin enteric coated 81 milliGRAM(s) Oral daily  atorvastatin 40 milliGRAM(s) Oral at bedtime  carvedilol 12.5 milliGRAM(s) Oral every 12 hours  furosemide    Tablet 20 milliGRAM(s) Oral daily  losartan 100 milliGRAM(s) Oral daily  gabapentin 300 milliGRAM(s) Oral daily  nortriptyline 25 milliGRAM(s) Oral daily  amLODIPine   Tablet 5 milliGRAM(s) Oral daily  levoFLOXacin  Tablet 750 milliGRAM(s) Oral every 24 hours    MEDICATIONS  (PRN):  dextrose Gel 1 Dose(s) Oral once PRN Blood Glucose LESS THAN 70 milliGRAM(s)/deciliter  glucagon  Injectable 1 milliGRAM(s) IntraMuscular once PRN Glucose LESS THAN 70 milligrams/deciliter  ALBUTerol/ipratropium for Nebulization 3 milliLiter(s) Nebulizer every 6 hours PRN Shortness of Breath and/or Wheezing    Vital Signs Last 24 Hrs  T(C): 36.7 (13 Jul 2017 13:53), Max: 37.1 (12 Jul 2017 19:58)  T(F): 98 (13 Jul 2017 13:53), Max: 98.8 (12 Jul 2017 19:58)  HR: 79 (13 Jul 2017 16:57) (75 - 99)  BP: 131/70 (13 Jul 2017 16:57) (130/78 - 162/58)  BP(mean): --  RR: 18 (13 Jul 2017 13:53) (16 - 23)  SpO2: 98% (13 Jul 2017 13:53) (92% - 99%)    I&O's Summary    12 Jul 2017 07:01  -  13 Jul 2017 07:00  --------------------------------------------------------  IN: 75 mL / OUT: 1380 mL / NET: -1305 mL    13 Jul 2017 07:01  -  13 Jul 2017 18:33  --------------------------------------------------------  IN: 240 mL / OUT: 0 mL / NET: 240 mL          Physical Exam:   GENERAL: NAD, well-groomed, well-developed  HEENT: TEA/   Atraumatic, Normocephalic  ENMT: No tonsillar erythema, exudates, or enlargement; Moist mucous membranes, Good dentition, No lesions  NECK: Supple, No JVD, Normal thyroid  CHEST/LUNG: Clear to percussion bilaterally; No rales, rhonchi, wheezing, or rubs  CVS: Regular rate and rhythm; No murmurs, rubs, or gallops  GI: : Soft, Nontender, Nondistended; Bowel sounds present  NERVOUS SYSTEM:  Alert & Oriented X3  EXTREMITIES:  2+ Peripheral Pulses, No clubbing, cyanosis, or edema  LYMPH: No lymphadenopathy noted  SKIN: No rashes or lesions  ENDOCRINOLOGY: No Thyromegaly  PSYCH: Appropriate    Labs:                              12.1   7.51  )-----------( 148      ( 12 Jul 2017 07:24 )             36.9                         12.0   7.10  )-----------( 128      ( 11 Jul 2017 07:33 )             37.0                         11.9   8.34  )-----------( 150      ( 10 Jul 2017 11:53 )             36.3     07-12    142  |  101  |  24<H>  ----------------------------<  129<H>  3.8   |  28  |  1.31<H>  07-11    141  |  100  |  23  ----------------------------<  120<H>  4.2   |  23  |  1.32<H>  07-10    142  |  100  |  21  ----------------------------<  134<H>  3.5   |  27  |  1.26    Ca    9.6      12 Jul 2017 07:24  Mg     2.0     07-12    TPro  7.5  /  Alb  3.6  /  TBili  0.4  /  DBili  x   /  AST  13  /  ALT  10  /  AlkPhos  77  07-10    CAPILLARY BLOOD GLUCOSE  121 (13 Jul 2017 16:57)  119 (13 Jul 2017 12:43)  130 (13 Jul 2017 08:29)  145 (12 Jul 2017 23:15)            PT/INR - ( 12 Jul 2017 07:24 )   PT: 12.8 SEC;   INR: 1.14          PTT - ( 12 Jul 2017 07:24 )  PTT:31.7 SEC    Cultures:     Studies  Chest X-RAY  CT SCAN Chest < from: Xray Chest 1 View AP- PORTABLE-Urgent (07.13.17 @ 00:44) >    IMPRESSION:  No significant change in left basilar subsegmental   atelectasis. The lungs are otherwise clear.    < end of copied text >    Venous Dopplers: LE:   CT Abdomen  Others    < from: CT Angio Chest w/ IV Cont (07.09.17 @ 18:21) >    IMPRESSION: Occlusion of the left upper lobe and left lower lobe bronchi.   Differential diagnosis is mucous plug versus neoplasm.    Findings are discussed with BLAIRE Luo on 7/10/2017 8:44 AM with read   back.    < end of copied text >    bronch lavaeg: no organisms: no endobronchial disease seen
Patient is a 71y old  Male who presents with a chief complaint of SOB (09 Jul 2017 09:42)    pt is doing better  no SOB   feels great, wants to go home       Any change in ROS:     MEDICATIONS  (STANDING):  enoxaparin Injectable 40 milliGRAM(s) SubCutaneous every 24 hours  insulin lispro (HumaLOG) corrective regimen sliding scale   SubCutaneous three times a day before meals  insulin lispro (HumaLOG) corrective regimen sliding scale   SubCutaneous at bedtime  dextrose 50% Injectable 12.5 Gram(s) IV Push once  dextrose 50% Injectable 25 Gram(s) IV Push once  dextrose 50% Injectable 25 Gram(s) IV Push once  aspirin enteric coated 81 milliGRAM(s) Oral daily  atorvastatin 40 milliGRAM(s) Oral at bedtime  carvedilol 12.5 milliGRAM(s) Oral every 12 hours  furosemide    Tablet 20 milliGRAM(s) Oral daily  losartan 100 milliGRAM(s) Oral daily  gabapentin 300 milliGRAM(s) Oral daily  nortriptyline 25 milliGRAM(s) Oral daily  amLODIPine   Tablet 5 milliGRAM(s) Oral daily  levoFLOXacin  Tablet 750 milliGRAM(s) Oral every 24 hours    MEDICATIONS  (PRN):  dextrose Gel 1 Dose(s) Oral once PRN Blood Glucose LESS THAN 70 milliGRAM(s)/deciliter  glucagon  Injectable 1 milliGRAM(s) IntraMuscular once PRN Glucose LESS THAN 70 milligrams/deciliter  ALBUTerol/ipratropium for Nebulization 3 milliLiter(s) Nebulizer every 6 hours PRN Shortness of Breath and/or Wheezing    Vital Signs Last 24 Hrs  T(C): 36.4 (14 Jul 2017 14:21), Max: 36.8 (14 Jul 2017 05:38)  T(F): 97.6 (14 Jul 2017 14:21), Max: 98.3 (14 Jul 2017 05:38)  HR: 87 (14 Jul 2017 14:21) (79 - 90)  BP: 136/64 (14 Jul 2017 14:21) (131/70 - 149/73)  BP(mean): --  RR: 18 (14 Jul 2017 14:21) (18 - 18)  SpO2: 95% (14 Jul 2017 14:21) (95% - 98%)    I&O's Summary    13 Jul 2017 07:01  -  14 Jul 2017 07:00  --------------------------------------------------------  IN: 440 mL / OUT: 0 mL / NET: 440 mL    14 Jul 2017 07:01  -  14 Jul 2017 14:31  --------------------------------------------------------  IN: 240 mL / OUT: 0 mL / NET: 240 mL          Physical Exam:   GENERAL: NAD, well-groomed, well-developed  HEENT: TEA/   Atraumatic, Normocephalic  ENMT: No tonsillar erythema, exudates, or enlargement; Moist mucous membranes, Good dentition, No lesions  NECK: Supple, No JVD, Normal thyroid  CHEST/LUNG: Clear to percussion bilaterally; No rales, rhonchi, wheezing, or rubs  CVS: Regular rate and rhythm; No murmurs, rubs, or gallops  GI: : Soft, Nontender, Nondistended; Bowel sounds present  NERVOUS SYSTEM:  Alert & Oriented X3  EXTREMITIES:  2+ Peripheral Pulses, No clubbing, cyanosis, or edema  LYMPH: No lymphadenopathy noted  SKIN: No rashes or lesions  ENDOCRINOLOGY: No Thyromegaly  PSYCH: Appropriate  Labs:                              11.3   7.40  )-----------( 135      ( 14 Jul 2017 06:00 )             35.1                         12.1   7.51  )-----------( 148      ( 12 Jul 2017 07:24 )             36.9                         12.0   7.10  )-----------( 128      ( 11 Jul 2017 07:33 )             37.0     07-14    140  |  102  |  33<H>  ----------------------------<  122<H>  3.5   |  25  |  1.41<H>  07-12    142  |  101  |  24<H>  ----------------------------<  129<H>  3.8   |  28  |  1.31<H>  07-11    141  |  100  |  23  ----------------------------<  120<H>  4.2   |  23  |  1.32<H>    Ca    9.4      14 Jul 2017 06:00  Mg     2.0     07-14      CAPILLARY BLOOD GLUCOSE  108 (14 Jul 2017 12:26)  138 (14 Jul 2017 08:44)  143 (13 Jul 2017 22:04)  121 (13 Jul 2017 16:57)          Studies  Chest X-RAY  CT SCAN Chest < from: Xray Chest 1 View AP- PORTABLE-Urgent (07.13.17 @ 00:44) >    IMPRESSION:  No significant change in left basilar subsegmental   atelectasis. The lungs are otherwise clear.    < end of copied text >    Venous Dopplers: LE:   CT Abdomen  Others      BAL: Normal respiratory Oma
Patient is a 71y old  Male who presents with a chief complaint of SOB (09 Jul 2017 09:42)  NAD      INTERVAL HPI/OVERNIGHT EVENTS:  T(C): 37.1 (07-10-17 @ 05:00), Max: 37.1 (07-10-17 @ 05:00)  HR: 88 (07-10-17 @ 05:00) (73 - 96)  BP: 143/69 (07-10-17 @ 05:00) (143/69 - 160/90)  RR: 17 (07-10-17 @ 05:00) (17 - 22)  SpO2: 100% (07-10-17 @ 05:00) (98% - 100%)  Wt(kg): --  I&O's Summary    09 Jul 2017 07:01  -  10 Jul 2017 07:00  --------------------------------------------------------  IN: 120 mL / OUT: 850 mL / NET: -730 mL    10 Jul 2017 07:01  -  10 Jul 2017 10:46  --------------------------------------------------------  IN: 240 mL / OUT: 0 mL / NET: 240 mL        LABS:                        11.6   8.89  )-----------( 137      ( 09 Jul 2017 05:07 )             35.7     07-09    144  |  102  |  24<H>  ----------------------------<  131<H>  4.2   |  26  |  1.26    Ca    9.5      09 Jul 2017 05:07    TPro  7.4  /  Alb  3.6  /  TBili  0.2  /  DBili  x   /  AST  14  /  ALT  10  /  AlkPhos  76  07-09        CAPILLARY BLOOD GLUCOSE  128 (10 Jul 2017 08:43)  129 (09 Jul 2017 22:26)  106 (09 Jul 2017 17:07)  153 (09 Jul 2017 12:41)                MEDICATIONS  (STANDING):  enoxaparin Injectable 40 milliGRAM(s) SubCutaneous every 24 hours  insulin lispro (HumaLOG) corrective regimen sliding scale   SubCutaneous three times a day before meals  insulin lispro (HumaLOG) corrective regimen sliding scale   SubCutaneous at bedtime  dextrose 50% Injectable 12.5 Gram(s) IV Push once  dextrose 50% Injectable 25 Gram(s) IV Push once  dextrose 50% Injectable 25 Gram(s) IV Push once  aspirin enteric coated 81 milliGRAM(s) Oral daily  atorvastatin 40 milliGRAM(s) Oral at bedtime  carvedilol 12.5 milliGRAM(s) Oral every 12 hours  furosemide    Tablet 20 milliGRAM(s) Oral daily  losartan 100 milliGRAM(s) Oral daily  gabapentin 300 milliGRAM(s) Oral daily  nortriptyline 25 milliGRAM(s) Oral daily  amLODIPine   Tablet 5 milliGRAM(s) Oral daily  levoFLOXacin  Tablet 750 milliGRAM(s) Oral every 24 hours  enzalutamide 120 milliGRAM(s) Oral daily    MEDICATIONS  (PRN):  dextrose Gel 1 Dose(s) Oral once PRN Blood Glucose LESS THAN 70 milliGRAM(s)/deciliter  glucagon  Injectable 1 milliGRAM(s) IntraMuscular once PRN Glucose LESS THAN 70 milligrams/deciliter  ALBUTerol/ipratropium for Nebulization 3 milliLiter(s) Nebulizer every 6 hours PRN Shortness of Breath and/or Wheezing          PHYSICAL EXAM:  GENERAL: NAD, well-groomed, well-developed  HEAD:  Atraumatic, Normocephalic  CHEST/LUNG: Clear to percussion bilaterally; No rales, rhonchi, wheezing, or rubs  HEART: Regular rate and rhythm; No murmurs, rubs, or gallops  ABDOMEN: Soft, Nontender, Nondistended; Bowel sounds present  EXTREMITIES:  2+ Peripheral Pulses, No clubbing, cyanosis, or edema  LYMPH: No lymphadenopathy noted  SKIN: No rashes or lesions    Care Discussed with Consultants/Other Providers [ ] YES  [ ] NO
Patient resting comfortably, s/p flex bronch on 7/12/17.  Lung sound decreased throughout.   Vital Signs Last 24 Hrs  T(C): 36.6 (13 Jul 2017 00:55), Max: 37.1 (12 Jul 2017 19:58)  T(F): 97.9 (13 Jul 2017 00:55), Max: 98.8 (12 Jul 2017 19:58)  HR: 89 (13 Jul 2017 00:55) (60 - 115)  BP: 148/85 (13 Jul 2017 00:55) (136/59 - 162/58)  BP(mean): --  RR: 18 (13 Jul 2017 00:55) (16 - 23)  SpO2: 99% (13 Jul 2017 00:55) (92% - 100%)    MEDICATIONS  (STANDING):  enoxaparin Injectable 40 milliGRAM(s) SubCutaneous every 24 hours  insulin lispro (HumaLOG) corrective regimen sliding scale   SubCutaneous three times a day before meals  insulin lispro (HumaLOG) corrective regimen sliding scale   SubCutaneous at bedtime  dextrose 50% Injectable 12.5 Gram(s) IV Push once  dextrose 50% Injectable 25 Gram(s) IV Push once  dextrose 50% Injectable 25 Gram(s) IV Push once  aspirin enteric coated 81 milliGRAM(s) Oral daily  atorvastatin 40 milliGRAM(s) Oral at bedtime  carvedilol 12.5 milliGRAM(s) Oral every 12 hours  furosemide    Tablet 20 milliGRAM(s) Oral daily  losartan 100 milliGRAM(s) Oral daily  gabapentin 300 milliGRAM(s) Oral daily  nortriptyline 25 milliGRAM(s) Oral daily  amLODIPine   Tablet 5 milliGRAM(s) Oral daily  levoFLOXacin  Tablet 750 milliGRAM(s) Oral every 24 hours

## 2017-07-14 NOTE — DISCHARGE NOTE ADULT - OTHER SIGNIFICANT FINDINGS
(PMH) HTN (hypertension)  (PMH) Prostate cancer  (PMH) Hyperlipidemia  (PMH) Neuropathy  (PMH) Diabetes mellitus, type 2  (PMH) CAD (coronary artery disease)  ((PSH) History of cholecystectomy  (PSH) H/O coronary artery bypass surgery

## 2017-07-14 NOTE — DISCHARGE NOTE ADULT - INSTRUCTIONS
Low salt, Low cholesterol, Low fat,diabetic diet. Low salt, Low cholesterol, Low fat, diabetic diet.

## 2017-07-14 NOTE — PROGRESS NOTE ADULT - PROVIDER SPECIALTY LIST ADULT
Anesthesia
Anesthesia
Hospitalist
Nephrology
Nephrology
Pulmonology
Pulmonology
Thoracic Surgery
Cardiology
Cardiology
Pulmonology
Cardiology

## 2017-07-14 NOTE — DISCHARGE NOTE ADULT - HOSPITAL COURSE
72 yo male PMHx of CAD, CABG, HLD, HTN, Prostate Cancer (on maintenance po med) and recent Pneumonia presents with recurrent non-productive cough and SOB for 3 days, admitted to tele for SOB, r/o ACS.    + ? JAMIE/LLL mucus plug vs neoplasm -s/p flex bronch 7/12  + ?Mod Aortic stenosis on TTE     7/9 had triplets on tele   7/11 multiple bigemeny, triplets   7/12-s/p Dental tooth extraction  EKG: Sinus Rhythm with RBBB @ 83 bpm  Lily x2:Neg  proBNP: 651  CXR: Left basilar subsegmental atelectasis. The lungs are otherwise clear.    7/9 [facility] consulted to c/w Enzalutamide: Patient is on Xtandi, it is reported that this medication can cause ~15%-20% of SOB/peripheral edema, but he has been on this medications for the last 3 months. Need to rule out CHF exacerbation or Lung etiology. Currently patient is unable lie down due to SOB. Consider CTA to r/o PE. Per the patient, he had a TTE recently, need to get records.  -Hold Xtandi for now.Patient is on Xtandi, it is reported that this medication can cause ~15%-20% of SOB/peripheral edema, but he has been on this medications for the last 3 months. Need to rule out CHF exacerbation or Lung etiology. Currently patient is unable lie down due to SOB. Consider CTA to r/o PE. Per the patient, he had a TTE recently, need to get records..    7/9 MED:  pt w/  recent Pneumonia presents with recurrent non-productive cough and SOB for 3 days, admitted to tele for SOB, r/o ACS. PLan:  tele monitor  cardiac enzymes x 2 Serial EKGs DASH 1800 ADA diet continue asa, coreg, furosemide, atorvastatin  Continue Levaquin PO consider Pulmonology consult.     7/10 MED: ·  recurrent non-productive cough and SOB for 3 days, admitted to tele for SOB, r/o ACS. CT Chest reviewed, Pulmonary fu, continue asa, coreg, furosemide, atorvastatin, Continue Levaquin PO, Hyperlipidemia - continue statin.  Prostate cancer  onc fu,  CAD  tele monitor continue asa, coreg, furosemide, atorvastatin.   7/10 CTPA: IMPRESSION: Occlusion of the left upper lobe and left lower lobe bronchi. Differential diagnosis is mucous plug versus neoplasm.  7/10 CARDIO: sob/cough, abnormal CT, fu with pulm for evaluation , cont anbx for ?PNA  CAD history of cabg asa statin, NSVT on tele check echo no evidence of AMI cont bb  7/11 Pulm: etiology of SOB is not clear.  he had recently taken antibiotics for possible pneumonia, but still felt sob and came to hospital. There is no PE on ct scan but there is evidence of mucus plugging vs malignancy: I have offered bronchoscopy to the patient, He asked me to call the wife which I did. I have explained about bronchoscopy to the pts wife and shed will talk to his  and will let me know: I prefer inpatient bronchoscopy. SOB could also be related to xtandi, which has been stopped for now.  7/11 CTS:  SOB and cough, treated for pneumonia with antibiotics with no symptom relief.    Npo after midnight- For bronch in am Pre-op workupMonitor respiratory status.  7/11 CArdio: Doroteo  7/12 Cards: CAD history of cabg: asa, statin; NSVT on tele-check echo, no evidence of AMI, cont bb  7/12 Pulm: Shortness of breath: pt today says he has been coughing out lot of phlegm. He says he has been doing better: Awaiting bronchoscopy today, to r/o endobronchial disease vs mucus plugging  7/12 Med: Shortness of breath: tele monitor, CT Chest reviewed, Pulmonary fu, bronch today, continue asa, coreg, furosemide, atorvastatin, Continue Levaquin PO; DM-II: Daily glucose monitoring, insulin sliding scale, DASH 1800 ADA diet, serum HgA1C; HLD: continue statin; Prostate cancer: onc fu; CAD: tele monitor, continue asa, coreg, furosemide, atorvastatin.   7/12 Renal: CKD, STAGE 1: increase water intake, Serum creatinine is increased, There is no progression.  No uremic symptoms. No evidence of  worsening  Anemia. Fluid status stable.   Will continue to avoid nephrotoxic drugs.  Patient remains asymptomatic.  Continue current therapy.  7/13 Thoracic: s/p flex bronch 7/12: Shortness of breath: Monitor respiratory status, F/U chest x-ray, Plan as per medicine.   7/13 Cardio: sob/cough, abnormal CT, fu with pulm for evaluation , cont anbx for ?PNA  likely needs bronch vs ebus, CAD history of cabg: asa, statin, NSVT on tele: check echo, no evidence of AMI, cont bb  7/13 2D Echo: EF 58%  1. Mitral annular calcification, otherwise normal mitral valve.  2. Calcified trileaflet aortic valve with decreased opening. The valve is not well visualized, unable to accurately estimate the valve area. There is probable moderate aortic stenosis. Peak transaortic valve gradient equals 13 mm Hg, mean transaortic valve gradient equals 6 mm Hg.  3. Increased relative wall thickness with normal left ventricular mass index, consistent with concentric left ventricular remodeling.  4. Normal left ventricular systolic function. No segmental wall motion abnormalities. Endocardial visualization enhanced with intravenous injection of echo contrast (Definity).    7/14 Pulmonary: Shortness of breath.  Plan: pt is doing much better: saying he has no SOB: The repeat chest radiograph just reveal left subsegmental atelectasis: BAL with normal resp victoriano: there is  no endobronchial disease. D6/7 of levofloxacin for possible pneumonia/ bronchitis.     7/14/17 Pt is medically stable for discharge home today as per Dr. Centeno. 72 yo male PMHx of CAD, CABG, HLD, HTN, Prostate Cancer (on maintenance po med) and recent Pneumonia presents with recurrent non-productive cough and SOB for 3 days, admitted to tele for SOB, r/o ACS.    + ? JAMIE/LLL mucus plug vs neoplasm -s/p flex bronch 7/12  + ?Mod Aortic stenosis on TTE     7/9 had triplets on tele   7/11 multiple bigemeny, triplets   7/12-s/p Dental tooth extraction  EKG: Sinus Rhythm with RBBB @ 83 bpm  Lily x2:Neg  proBNP: 651  CXR: Left basilar subsegmental atelectasis. The lungs are otherwise clear.    7/9 [facility] consulted to c/w Enzalutamide: Patient is on Xtandi, it is reported that this medication can cause ~15%-20% of SOB/peripheral edema, but he has been on this medications for the last 3 months. Need to rule out CHF exacerbation or Lung etiology. Currently patient is unable lie down due to SOB. Consider CTA to r/o PE. Per the patient, he had a TTE recently, need to get records.  -Hold Xtandi for now.Patient is on Xtandi, it is reported that this medication can cause ~15%-20% of SOB/peripheral edema, but he has been on this medications for the last 3 months. Need to rule out CHF exacerbation or Lung etiology. Currently patient is unable lie down due to SOB. Consider CTA to r/o PE. Per the patient, he had a TTE recently, need to get records..    7/9 MED:  pt w/  recent Pneumonia presents with recurrent non-productive cough and SOB for 3 days, admitted to tele for SOB, r/o ACS. PLan:  tele monitor  cardiac enzymes x 2 Serial EKGs DASH 1800 ADA diet continue asa, coreg, furosemide, atorvastatin  Continue Levaquin PO consider Pulmonology consult.     7/10 MED: ·  recurrent non-productive cough and SOB for 3 days, admitted to tele for SOB, r/o ACS. CT Chest reviewed, Pulmonary fu, continue asa, coreg, furosemide, atorvastatin, Continue Levaquin PO, Hyperlipidemia - continue statin.  Prostate cancer  onc fu,  CAD  tele monitor continue asa, coreg, furosemide, atorvastatin.   7/10 CTPA: IMPRESSION: Occlusion of the left upper lobe and left lower lobe bronchi. Differential diagnosis is mucous plug versus neoplasm.  7/10 CARDIO: sob/cough, abnormal CT, fu with pulm for evaluation , cont anbx for ?PNA  CAD history of cabg asa statin, NSVT on tele check echo no evidence of AMI cont bb  7/11 Pulm: etiology of SOB is not clear.  he had recently taken antibiotics for possible pneumonia, but still felt sob and came to hospital. There is no PE on ct scan but there is evidence of mucus plugging vs malignancy: I have offered bronchoscopy to the patient, He asked me to call the wife which I did. I have explained about bronchoscopy to the pts wife and shed will talk to his  and will let me know: I prefer inpatient bronchoscopy. SOB could also be related to xtandi, which has been stopped for now.  7/11 CTS:  SOB and cough, treated for pneumonia with antibiotics with no symptom relief.    Npo after midnight- For bronch in am Pre-op workupMonitor respiratory status.  7/11 CArdio: Doroteo  7/12 Cards: CAD history of cabg: asa, statin; NSVT on tele-check echo, no evidence of AMI, cont bb  7/12 Pulm: Shortness of breath: pt today says he has been coughing out lot of phlegm. He says he has been doing better: Awaiting bronchoscopy today, to r/o endobronchial disease vs mucus plugging  7/12 Med: Shortness of breath: tele monitor, CT Chest reviewed, Pulmonary fu, bronch today, continue asa, coreg, furosemide, atorvastatin, Continue Levaquin PO; DM-II: Daily glucose monitoring, insulin sliding scale, DASH 1800 ADA diet, serum HgA1C; HLD: continue statin; Prostate cancer: onc fu; CAD: tele monitor, continue asa, coreg, furosemide, atorvastatin.   7/12 Renal: CKD, STAGE 1: increase water intake, Serum creatinine is increased, There is no progression.  No uremic symptoms. No evidence of  worsening  Anemia. Fluid status stable.   Will continue to avoid nephrotoxic drugs.  Patient remains asymptomatic.  Continue current therapy.  7/13 Thoracic: s/p flex bronch 7/12: Shortness of breath: Monitor respiratory status, F/U chest x-ray, Plan as per medicine.   7/13 Cardio: sob/cough, abnormal CT, fu with pulm for evaluation , cont anbx for ?PNA  likely needs bronch vs ebus, CAD history of cabg: asa, statin, NSVT on tele: check echo, no evidence of AMI, cont bb  7/13 2D Echo: EF 58%  1. Mitral annular calcification, otherwise normal mitral valve.  2. Calcified trileaflet aortic valve with decreased opening. The valve is not well visualized, unable to accurately estimate the valve area. There is probable moderate aortic stenosis. Peak transaortic valve gradient equals 13 mm Hg, mean transaortic valve gradient equals 6 mm Hg.  3. Increased relative wall thickness with normal left ventricular mass index, consistent with concentric left ventricular remodeling.  4. Normal left ventricular systolic function. No segmental wall motion abnormalities. Endocardial visualization enhanced with intravenous injection of echo contrast (Definity).    7/14 Pulmonary: Shortness of breath.  Plan: pt is doing much better: saying he has no SOB: The repeat chest radiograph just reveal left subsegmental atelectasis: BAL with normal resp victoriano: there is  no endobronchial disease. D6/7 of levofloxacin for possible pneumonia/ bronchitis.     7/14/17 Pt is medically stable for discharge home today as per Dr. Centeno.       Seen and examined bedside. Time spent on coordinating dc 50 min

## 2017-07-14 NOTE — DISCHARGE NOTE ADULT - PLAN OF CARE
Continue to monitor. s/p flex bronch.   Continue current medications.   On Levaquin for 1 more dose.   Follow-up with your Private Medical Doctor within 1 week. Reduce blood pressure. Continue Norvasc, Coreg, Lasix.   Monitor your blood pressure. Prevent progression of disease. Continue ASA, Lipitor. Reduce blood glucose. Continue Metformin, current medications.   Monitor your fingersticks.

## 2017-07-14 NOTE — PROGRESS NOTE ADULT - PROBLEM SELECTOR PROBLEM 2
Diabetes mellitus, type 2
HTN (hypertension)
Diabetes mellitus, type 2
Diabetes mellitus, type 2
HTN (hypertension)

## 2017-07-14 NOTE — PROGRESS NOTE ADULT - PROBLEM SELECTOR PROBLEM 5
CAD (coronary artery disease)
HTN (hypertension)
CAD (coronary artery disease)
HTN (hypertension)
HTN (hypertension)

## 2017-07-14 NOTE — PROGRESS NOTE ADULT - PROBLEM SELECTOR PLAN 1
CHRONIC KIDNEY DISEASE, STAGE 1: increase water intake   dc lasix and decrease levaquin dose due to increase cr d/w dr krishna   Serum creatinine is increased  approximating a GFR of decreased  ml/min.   There is no progression.  No uremic symptoms. No evidence of  worsening  Anemia. Fluid status stable.   Will continue to avoid nephrotoxic drugs.  Patient remains asymptomatic.  Continue current therapy.
pt is doing much better: saying he has no SOB: The repeat chest radiograph just reveal left subsegmental atelectasis: BAL with normal resp victoriano: there is  no endobronchial disease. D6/7 of levofloxacin for possible pneumonia/ bronchitis
pt today says he has been coughing out lot of phlegm. He says he has been doing better: s/p bronchoscopy: no endobronchial disease: cultures negative so far:  D5/7 of levofloxacin today: Mucus plugging removed: Chest radiograph tomorrow
Monitor respiratory status  F/U chest x-ray  Plan as per medicine
CHRONIC KIDNEY DISEASE, STAGE 1: increase water intake   Serum creatinine is increased  approximating a GFR of decreased  ml/min.   There is no progression.  No uremic symptoms. No evidence of  worsening  Anemia. Fluid status stable.   Will continue to avoid nephrotoxic drugs.  Patient remains asymptomatic.  Continue current therapy.
pt today says he has been coughing out lot of phlegm. He says he has been doing better: Awaiting bronchoscopy today, to r/o endobronchial disease vs mucus plugging.

## 2017-07-14 NOTE — DISCHARGE NOTE ADULT - CARE PROVIDER_API CALL
Follow-up:,   Follow-up with your Private Medical Doctor within 1 week.  Phone: (   )    -  Fax: (   )    -

## 2017-07-14 NOTE — PROGRESS NOTE ADULT - PROBLEM SELECTOR PROBLEM 4
CAD (coronary artery disease)
CHF (congestive heart failure)
CAD (coronary artery disease)
CAD (coronary artery disease)
CHF (congestive heart failure)

## 2017-07-14 NOTE — DISCHARGE NOTE ADULT - ADDITIONAL INSTRUCTIONS
Follow-up with your Private Medical Doctor within 1 week. Follow-up with your Private Medical Doctor within 1 week.  Follow-up with your Cardiologist for a repeat Echo to monitor your heart valves.

## 2017-07-14 NOTE — PROGRESS NOTE ADULT - PROBLEM SELECTOR PLAN 2
BP monitoring,continue current antihypertensive meds, low salt diet,followup with PMD in 1-2 weeks
His blood glucose is controlled
His blood glucose is controlled
BP monitoring,continue current antihypertensive meds, low salt diet,followup with PMD in 1-2 weeks
His blood glucose is controlled

## 2017-07-15 LAB — BACTERIA SPT RESP CULT: SIGNIFICANT CHANGE UP

## 2018-01-30 NOTE — ED PROVIDER NOTE - NSCAREINITIATED _GEN_ER
PLAN:  I will prescribe a Zpak for the infection and phenergan with codeine for the cough. The patient is instructed to call the office if she is not improving in a couple days.
Del Carrion(Attending)

## 2020-01-01 ENCOUNTER — INPATIENT (INPATIENT)
Facility: HOSPITAL | Age: 75
LOS: 11 days | DRG: 870 | End: 2020-05-20
Attending: HOSPITALIST | Admitting: HOSPITALIST
Payer: MEDICARE

## 2020-01-01 VITALS
RESPIRATION RATE: 16 BRPM | WEIGHT: 235.01 LBS | DIASTOLIC BLOOD PRESSURE: 69 MMHG | SYSTOLIC BLOOD PRESSURE: 104 MMHG | TEMPERATURE: 99 F | HEIGHT: 68 IN | HEART RATE: 75 BPM | OXYGEN SATURATION: 94 %

## 2020-01-01 VITALS — OXYGEN SATURATION: 95 % | SYSTOLIC BLOOD PRESSURE: 155 MMHG | DIASTOLIC BLOOD PRESSURE: 69 MMHG | HEART RATE: 82 BPM

## 2020-01-01 DIAGNOSIS — R50.9 FEVER, UNSPECIFIED: ICD-10-CM

## 2020-01-01 DIAGNOSIS — E87.0 HYPEROSMOLALITY AND HYPERNATREMIA: ICD-10-CM

## 2020-01-01 DIAGNOSIS — R29.898 OTHER SYMPTOMS AND SIGNS INVOLVING THE MUSCULOSKELETAL SYSTEM: ICD-10-CM

## 2020-01-01 DIAGNOSIS — I27.20 PULMONARY HYPERTENSION, UNSPECIFIED: ICD-10-CM

## 2020-01-01 DIAGNOSIS — E11.9 TYPE 2 DIABETES MELLITUS WITHOUT COMPLICATIONS: ICD-10-CM

## 2020-01-01 DIAGNOSIS — N17.9 ACUTE KIDNEY FAILURE, UNSPECIFIED: ICD-10-CM

## 2020-01-01 DIAGNOSIS — J69.0 PNEUMONITIS DUE TO INHALATION OF FOOD AND VOMIT: ICD-10-CM

## 2020-01-01 DIAGNOSIS — D72.829 ELEVATED WHITE BLOOD CELL COUNT, UNSPECIFIED: ICD-10-CM

## 2020-01-01 DIAGNOSIS — I25.10 ATHEROSCLEROTIC HEART DISEASE OF NATIVE CORONARY ARTERY WITHOUT ANGINA PECTORIS: ICD-10-CM

## 2020-01-01 DIAGNOSIS — R06.00 DYSPNEA, UNSPECIFIED: ICD-10-CM

## 2020-01-01 DIAGNOSIS — N18.3 CHRONIC KIDNEY DISEASE, STAGE 3 (MODERATE): ICD-10-CM

## 2020-01-01 DIAGNOSIS — Z79.899 OTHER LONG TERM (CURRENT) DRUG THERAPY: ICD-10-CM

## 2020-01-01 DIAGNOSIS — J96.01 ACUTE RESPIRATORY FAILURE WITH HYPOXIA: ICD-10-CM

## 2020-01-01 DIAGNOSIS — Z29.9 ENCOUNTER FOR PROPHYLACTIC MEASURES, UNSPECIFIED: ICD-10-CM

## 2020-01-01 DIAGNOSIS — I10 ESSENTIAL (PRIMARY) HYPERTENSION: ICD-10-CM

## 2020-01-01 DIAGNOSIS — C61 MALIGNANT NEOPLASM OF PROSTATE: ICD-10-CM

## 2020-01-01 DIAGNOSIS — T14.8XXA OTHER INJURY OF UNSPECIFIED BODY REGION, INITIAL ENCOUNTER: ICD-10-CM

## 2020-01-01 DIAGNOSIS — J18.9 PNEUMONIA, UNSPECIFIED ORGANISM: ICD-10-CM

## 2020-01-01 DIAGNOSIS — Z02.9 ENCOUNTER FOR ADMINISTRATIVE EXAMINATIONS, UNSPECIFIED: ICD-10-CM

## 2020-01-01 DIAGNOSIS — R09.02 HYPOXEMIA: ICD-10-CM

## 2020-01-01 LAB
A1C WITH ESTIMATED AVERAGE GLUCOSE RESULT: 8.4 % — HIGH (ref 4–5.6)
ALBUMIN SERPL ELPH-MCNC: 1.8 G/DL — LOW (ref 3.3–5)
ALBUMIN SERPL ELPH-MCNC: 1.8 G/DL — LOW (ref 3.3–5)
ALBUMIN SERPL ELPH-MCNC: 1.9 G/DL — LOW (ref 3.3–5)
ALBUMIN SERPL ELPH-MCNC: 1.9 G/DL — LOW (ref 3.3–5)
ALBUMIN SERPL ELPH-MCNC: 2 G/DL — LOW (ref 3.3–5)
ALBUMIN SERPL ELPH-MCNC: 2 G/DL — LOW (ref 3.3–5)
ALBUMIN SERPL ELPH-MCNC: 2.1 G/DL — LOW (ref 3.3–5)
ALBUMIN SERPL ELPH-MCNC: 2.2 G/DL — LOW (ref 3.3–5)
ALBUMIN SERPL ELPH-MCNC: 2.2 G/DL — LOW (ref 3.3–5)
ALBUMIN SERPL ELPH-MCNC: 2.3 G/DL — LOW (ref 3.3–5)
ALBUMIN SERPL ELPH-MCNC: 2.6 G/DL — LOW (ref 3.3–5)
ALBUMIN SERPL ELPH-MCNC: 3 G/DL — LOW (ref 3.3–5)
ALBUMIN SERPL ELPH-MCNC: 3.4 G/DL — SIGNIFICANT CHANGE UP (ref 3.3–5)
ALP SERPL-CCNC: 139 U/L — HIGH (ref 40–120)
ALP SERPL-CCNC: 139 U/L — HIGH (ref 40–120)
ALP SERPL-CCNC: 140 U/L — HIGH (ref 40–120)
ALP SERPL-CCNC: 140 U/L — HIGH (ref 40–120)
ALP SERPL-CCNC: 142 U/L — HIGH (ref 40–120)
ALP SERPL-CCNC: 149 U/L — HIGH (ref 40–120)
ALP SERPL-CCNC: 155 U/L — HIGH (ref 40–120)
ALP SERPL-CCNC: 157 U/L — HIGH (ref 40–120)
ALP SERPL-CCNC: 167 U/L — HIGH (ref 40–120)
ALP SERPL-CCNC: 171 U/L — HIGH (ref 40–120)
ALP SERPL-CCNC: 176 U/L — HIGH (ref 40–120)
ALP SERPL-CCNC: 191 U/L — HIGH (ref 40–120)
ALP SERPL-CCNC: 240 U/L — HIGH (ref 40–120)
ALP SERPL-CCNC: 296 U/L — HIGH (ref 40–120)
ALP SERPL-CCNC: 358 U/L — HIGH (ref 40–120)
ALP SERPL-CCNC: 529 U/L — HIGH (ref 40–120)
ALP SERPL-CCNC: 636 U/L — HIGH (ref 40–120)
ALT FLD-CCNC: 10 U/L — SIGNIFICANT CHANGE UP (ref 10–45)
ALT FLD-CCNC: 11 U/L — SIGNIFICANT CHANGE UP (ref 10–45)
ALT FLD-CCNC: 13 U/L — SIGNIFICANT CHANGE UP (ref 10–45)
ALT FLD-CCNC: 8 U/L — LOW (ref 10–45)
ALT FLD-CCNC: 9 U/L — LOW (ref 10–45)
AMMONIA BLD-MCNC: 46 UMOL/L — SIGNIFICANT CHANGE UP (ref 11–55)
ANION GAP SERPL CALC-SCNC: 11 MMOL/L — SIGNIFICANT CHANGE UP (ref 5–17)
ANION GAP SERPL CALC-SCNC: 12 MMOL/L — SIGNIFICANT CHANGE UP (ref 5–17)
ANION GAP SERPL CALC-SCNC: 12 MMOL/L — SIGNIFICANT CHANGE UP (ref 5–17)
ANION GAP SERPL CALC-SCNC: 13 MMOL/L — SIGNIFICANT CHANGE UP (ref 5–17)
ANION GAP SERPL CALC-SCNC: 13 MMOL/L — SIGNIFICANT CHANGE UP (ref 5–17)
ANION GAP SERPL CALC-SCNC: 14 MMOL/L — SIGNIFICANT CHANGE UP (ref 5–17)
ANION GAP SERPL CALC-SCNC: 15 MMOL/L — SIGNIFICANT CHANGE UP (ref 5–17)
ANION GAP SERPL CALC-SCNC: 15 MMOL/L — SIGNIFICANT CHANGE UP (ref 5–17)
ANION GAP SERPL CALC-SCNC: 17 MMOL/L — SIGNIFICANT CHANGE UP (ref 5–17)
ANION GAP SERPL CALC-SCNC: 18 MMOL/L — HIGH (ref 5–17)
ANION GAP SERPL CALC-SCNC: 21 MMOL/L — HIGH (ref 5–17)
ANION GAP SERPL CALC-SCNC: 9 MMOL/L — SIGNIFICANT CHANGE UP (ref 5–17)
APPEARANCE UR: ABNORMAL
APPEARANCE UR: ABNORMAL
APTT BLD: 105.4 SEC — HIGH (ref 27.5–36.3)
APTT BLD: 105.9 SEC — HIGH (ref 27.5–36.3)
APTT BLD: 106.7 SEC — HIGH (ref 27.5–36.3)
APTT BLD: 150.1 SEC — CRITICAL HIGH (ref 27.5–36.3)
APTT BLD: 23.2 SEC — LOW (ref 27.5–36.3)
APTT BLD: 30.5 SEC — SIGNIFICANT CHANGE UP (ref 27.5–36.3)
APTT BLD: 51.9 SEC — HIGH (ref 27.5–36.3)
APTT BLD: 57.4 SEC — HIGH (ref 27.5–36.3)
APTT BLD: 62 SEC — HIGH (ref 27.5–36.3)
APTT BLD: 67.6 SEC — HIGH (ref 27.5–36.3)
APTT BLD: 68 SEC — HIGH (ref 27.5–36.3)
APTT BLD: 75.7 SEC — HIGH (ref 27.5–36.3)
APTT BLD: 76.3 SEC — HIGH (ref 27.5–36.3)
APTT BLD: 80.5 SEC — HIGH (ref 27.5–36.3)
APTT BLD: 92.2 SEC — HIGH (ref 27.5–36.3)
APTT BLD: 93.9 SEC — HIGH (ref 27.5–36.3)
APTT BLD: 94.2 SEC — HIGH (ref 27.5–36.3)
APTT BLD: 98.7 SEC — HIGH (ref 27.5–36.3)
AST SERPL-CCNC: 11 U/L — SIGNIFICANT CHANGE UP (ref 10–40)
AST SERPL-CCNC: 12 U/L — SIGNIFICANT CHANGE UP (ref 10–40)
AST SERPL-CCNC: 13 U/L — SIGNIFICANT CHANGE UP (ref 10–40)
AST SERPL-CCNC: 14 U/L — SIGNIFICANT CHANGE UP (ref 10–40)
AST SERPL-CCNC: 14 U/L — SIGNIFICANT CHANGE UP (ref 10–40)
AST SERPL-CCNC: 16 U/L — SIGNIFICANT CHANGE UP (ref 10–40)
AST SERPL-CCNC: 17 U/L — SIGNIFICANT CHANGE UP (ref 10–40)
AST SERPL-CCNC: 17 U/L — SIGNIFICANT CHANGE UP (ref 10–40)
AST SERPL-CCNC: 18 U/L — SIGNIFICANT CHANGE UP (ref 10–40)
AST SERPL-CCNC: 19 U/L — SIGNIFICANT CHANGE UP (ref 10–40)
AST SERPL-CCNC: 38 U/L — SIGNIFICANT CHANGE UP (ref 10–40)
AST SERPL-CCNC: 42 U/L — HIGH (ref 10–40)
AST SERPL-CCNC: 9 U/L — LOW (ref 10–40)
BACTERIA # UR AUTO: ABNORMAL
BACTERIA # UR AUTO: NEGATIVE — SIGNIFICANT CHANGE UP
BASE EXCESS BLDA CALC-SCNC: -3.3 MMOL/L — LOW (ref -2–2)
BASE EXCESS BLDV CALC-SCNC: -0.9 MMOL/L — SIGNIFICANT CHANGE UP (ref -2–2)
BASE EXCESS BLDV CALC-SCNC: -1.1 MMOL/L — SIGNIFICANT CHANGE UP (ref -2–2)
BASE EXCESS BLDV CALC-SCNC: -1.6 MMOL/L — SIGNIFICANT CHANGE UP (ref -2–2)
BASE EXCESS BLDV CALC-SCNC: 1.3 MMOL/L — SIGNIFICANT CHANGE UP (ref -2–2)
BASOPHILS # BLD AUTO: 0.04 K/UL — SIGNIFICANT CHANGE UP (ref 0–0.2)
BASOPHILS # BLD AUTO: 0.05 K/UL — SIGNIFICANT CHANGE UP (ref 0–0.2)
BASOPHILS # BLD AUTO: 0.08 K/UL — SIGNIFICANT CHANGE UP (ref 0–0.2)
BASOPHILS # BLD AUTO: 0.1 K/UL — SIGNIFICANT CHANGE UP (ref 0–0.2)
BASOPHILS NFR BLD AUTO: 0.2 % — SIGNIFICANT CHANGE UP (ref 0–2)
BASOPHILS NFR BLD AUTO: 0.3 % — SIGNIFICANT CHANGE UP (ref 0–2)
BASOPHILS NFR BLD AUTO: 0.4 % — SIGNIFICANT CHANGE UP (ref 0–2)
BASOPHILS NFR BLD AUTO: 0.5 % — SIGNIFICANT CHANGE UP (ref 0–2)
BASOPHILS NFR BLD AUTO: 0.6 % — SIGNIFICANT CHANGE UP (ref 0–2)
BILIRUB DIRECT SERPL-MCNC: 0.1 MG/DL — SIGNIFICANT CHANGE UP (ref 0–0.2)
BILIRUB INDIRECT FLD-MCNC: 0.2 MG/DL — SIGNIFICANT CHANGE UP (ref 0.2–1)
BILIRUB SERPL-MCNC: 0.1 MG/DL — LOW (ref 0.2–1.2)
BILIRUB SERPL-MCNC: 0.2 MG/DL — SIGNIFICANT CHANGE UP (ref 0.2–1.2)
BILIRUB SERPL-MCNC: 0.3 MG/DL — SIGNIFICANT CHANGE UP (ref 0.2–1.2)
BILIRUB SERPL-MCNC: 0.5 MG/DL — SIGNIFICANT CHANGE UP (ref 0.2–1.2)
BILIRUB SERPL-MCNC: 0.6 MG/DL — SIGNIFICANT CHANGE UP (ref 0.2–1.2)
BILIRUB SERPL-MCNC: <0.1 MG/DL — LOW (ref 0.2–1.2)
BILIRUB UR-MCNC: ABNORMAL
BILIRUB UR-MCNC: NEGATIVE — SIGNIFICANT CHANGE UP
BLD GP AB SCN SERPL QL: NEGATIVE — SIGNIFICANT CHANGE UP
BUN SERPL-MCNC: 43 MG/DL — HIGH (ref 7–23)
BUN SERPL-MCNC: 44 MG/DL — HIGH (ref 7–23)
BUN SERPL-MCNC: 46 MG/DL — HIGH (ref 7–23)
BUN SERPL-MCNC: 47 MG/DL — HIGH (ref 7–23)
BUN SERPL-MCNC: 48 MG/DL — HIGH (ref 7–23)
BUN SERPL-MCNC: 49 MG/DL — HIGH (ref 7–23)
BUN SERPL-MCNC: 51 MG/DL — HIGH (ref 7–23)
BUN SERPL-MCNC: 53 MG/DL — HIGH (ref 7–23)
BUN SERPL-MCNC: 54 MG/DL — HIGH (ref 7–23)
BUN SERPL-MCNC: 56 MG/DL — HIGH (ref 7–23)
BUN SERPL-MCNC: 60 MG/DL — HIGH (ref 7–23)
BUN SERPL-MCNC: 61 MG/DL — HIGH (ref 7–23)
BUN SERPL-MCNC: 62 MG/DL — HIGH (ref 7–23)
BUN SERPL-MCNC: 68 MG/DL — HIGH (ref 7–23)
BUN SERPL-MCNC: 69 MG/DL — HIGH (ref 7–23)
BUN SERPL-MCNC: 75 MG/DL — HIGH (ref 7–23)
BUN SERPL-MCNC: 78 MG/DL — HIGH (ref 7–23)
BUN SERPL-MCNC: 79 MG/DL — HIGH (ref 7–23)
BUN SERPL-MCNC: 80 MG/DL — HIGH (ref 7–23)
CA-I SERPL-SCNC: 1.17 MMOL/L — SIGNIFICANT CHANGE UP (ref 1.12–1.3)
CA-I SERPL-SCNC: 1.17 MMOL/L — SIGNIFICANT CHANGE UP (ref 1.12–1.3)
CA-I SERPL-SCNC: 1.21 MMOL/L — SIGNIFICANT CHANGE UP (ref 1.12–1.3)
CA-I SERPL-SCNC: 1.21 MMOL/L — SIGNIFICANT CHANGE UP (ref 1.12–1.3)
CALCIUM SERPL-MCNC: 8.2 MG/DL — LOW (ref 8.4–10.5)
CALCIUM SERPL-MCNC: 8.3 MG/DL — LOW (ref 8.4–10.5)
CALCIUM SERPL-MCNC: 8.3 MG/DL — LOW (ref 8.4–10.5)
CALCIUM SERPL-MCNC: 8.4 MG/DL — SIGNIFICANT CHANGE UP (ref 8.4–10.5)
CALCIUM SERPL-MCNC: 8.5 MG/DL — SIGNIFICANT CHANGE UP (ref 8.4–10.5)
CALCIUM SERPL-MCNC: 8.7 MG/DL — SIGNIFICANT CHANGE UP (ref 8.4–10.5)
CALCIUM SERPL-MCNC: 8.7 MG/DL — SIGNIFICANT CHANGE UP (ref 8.4–10.5)
CALCIUM SERPL-MCNC: 8.8 MG/DL — SIGNIFICANT CHANGE UP (ref 8.4–10.5)
CALCIUM SERPL-MCNC: 8.8 MG/DL — SIGNIFICANT CHANGE UP (ref 8.4–10.5)
CALCIUM SERPL-MCNC: 8.9 MG/DL — SIGNIFICANT CHANGE UP (ref 8.4–10.5)
CALCIUM SERPL-MCNC: 9 MG/DL — SIGNIFICANT CHANGE UP (ref 8.4–10.5)
CALCIUM SERPL-MCNC: 9 MG/DL — SIGNIFICANT CHANGE UP (ref 8.4–10.5)
CALCIUM SERPL-MCNC: 9.1 MG/DL — SIGNIFICANT CHANGE UP (ref 8.4–10.5)
CALCIUM SERPL-MCNC: 9.2 MG/DL — SIGNIFICANT CHANGE UP (ref 8.4–10.5)
CALCIUM SERPL-MCNC: 9.2 MG/DL — SIGNIFICANT CHANGE UP (ref 8.4–10.5)
CALCIUM SERPL-MCNC: 9.4 MG/DL — SIGNIFICANT CHANGE UP (ref 8.4–10.5)
CALCIUM SERPL-MCNC: 9.5 MG/DL — SIGNIFICANT CHANGE UP (ref 8.4–10.5)
CHLORIDE BLDV-SCNC: 108 MMOL/L — SIGNIFICANT CHANGE UP (ref 96–108)
CHLORIDE BLDV-SCNC: 108 MMOL/L — SIGNIFICANT CHANGE UP (ref 96–108)
CHLORIDE BLDV-SCNC: 113 MMOL/L — HIGH (ref 96–108)
CHLORIDE BLDV-SCNC: 115 MMOL/L — HIGH (ref 96–108)
CHLORIDE SERPL-SCNC: 102 MMOL/L — SIGNIFICANT CHANGE UP (ref 96–108)
CHLORIDE SERPL-SCNC: 103 MMOL/L — SIGNIFICANT CHANGE UP (ref 96–108)
CHLORIDE SERPL-SCNC: 104 MMOL/L — SIGNIFICANT CHANGE UP (ref 96–108)
CHLORIDE SERPL-SCNC: 105 MMOL/L — SIGNIFICANT CHANGE UP (ref 96–108)
CHLORIDE SERPL-SCNC: 106 MMOL/L — SIGNIFICANT CHANGE UP (ref 96–108)
CHLORIDE SERPL-SCNC: 106 MMOL/L — SIGNIFICANT CHANGE UP (ref 96–108)
CHLORIDE SERPL-SCNC: 107 MMOL/L — SIGNIFICANT CHANGE UP (ref 96–108)
CHLORIDE SERPL-SCNC: 110 MMOL/L — HIGH (ref 96–108)
CHLORIDE SERPL-SCNC: 112 MMOL/L — HIGH (ref 96–108)
CHLORIDE SERPL-SCNC: 113 MMOL/L — HIGH (ref 96–108)
CHLORIDE SERPL-SCNC: 114 MMOL/L — HIGH (ref 96–108)
CHLORIDE SERPL-SCNC: 115 MMOL/L — HIGH (ref 96–108)
CHLORIDE SERPL-SCNC: 115 MMOL/L — HIGH (ref 96–108)
CHLORIDE SERPL-SCNC: 116 MMOL/L — HIGH (ref 96–108)
CK MB CFR SERPL CALC: 1 NG/ML — SIGNIFICANT CHANGE UP (ref 0–6.7)
CK SERPL-CCNC: 51 U/L — SIGNIFICANT CHANGE UP (ref 30–200)
CK SERPL-CCNC: 562 U/L — HIGH (ref 30–200)
CK SERPL-CCNC: 787 U/L — HIGH (ref 30–200)
CO2 BLDA-SCNC: 26 MMOL/L — SIGNIFICANT CHANGE UP (ref 22–30)
CO2 BLDV-SCNC: 24 MMOL/L — SIGNIFICANT CHANGE UP (ref 22–30)
CO2 BLDV-SCNC: 28 MMOL/L — SIGNIFICANT CHANGE UP (ref 22–30)
CO2 SERPL-SCNC: 18 MMOL/L — LOW (ref 22–31)
CO2 SERPL-SCNC: 18 MMOL/L — LOW (ref 22–31)
CO2 SERPL-SCNC: 19 MMOL/L — LOW (ref 22–31)
CO2 SERPL-SCNC: 19 MMOL/L — LOW (ref 22–31)
CO2 SERPL-SCNC: 20 MMOL/L — LOW (ref 22–31)
CO2 SERPL-SCNC: 21 MMOL/L — LOW (ref 22–31)
CO2 SERPL-SCNC: 22 MMOL/L — SIGNIFICANT CHANGE UP (ref 22–31)
CO2 SERPL-SCNC: 22 MMOL/L — SIGNIFICANT CHANGE UP (ref 22–31)
CO2 SERPL-SCNC: 23 MMOL/L — SIGNIFICANT CHANGE UP (ref 22–31)
CO2 SERPL-SCNC: 24 MMOL/L — SIGNIFICANT CHANGE UP (ref 22–31)
CO2 SERPL-SCNC: 24 MMOL/L — SIGNIFICANT CHANGE UP (ref 22–31)
CO2 SERPL-SCNC: 25 MMOL/L — SIGNIFICANT CHANGE UP (ref 22–31)
COLOR SPEC: SIGNIFICANT CHANGE UP
COLOR SPEC: YELLOW — SIGNIFICANT CHANGE UP
CREAT ?TM UR-MCNC: 157 MG/DL — SIGNIFICANT CHANGE UP
CREAT ?TM UR-MCNC: 186 MG/DL — SIGNIFICANT CHANGE UP
CREAT ?TM UR-MCNC: 204 MG/DL — SIGNIFICANT CHANGE UP
CREAT ?TM UR-MCNC: 62 MG/DL — SIGNIFICANT CHANGE UP
CREAT SERPL-MCNC: 1.46 MG/DL — HIGH (ref 0.5–1.3)
CREAT SERPL-MCNC: 1.5 MG/DL — HIGH (ref 0.5–1.3)
CREAT SERPL-MCNC: 1.58 MG/DL — HIGH (ref 0.5–1.3)
CREAT SERPL-MCNC: 1.6 MG/DL — HIGH (ref 0.5–1.3)
CREAT SERPL-MCNC: 1.61 MG/DL — HIGH (ref 0.5–1.3)
CREAT SERPL-MCNC: 1.63 MG/DL — HIGH (ref 0.5–1.3)
CREAT SERPL-MCNC: 1.64 MG/DL — HIGH (ref 0.5–1.3)
CREAT SERPL-MCNC: 1.83 MG/DL — HIGH (ref 0.5–1.3)
CREAT SERPL-MCNC: 1.96 MG/DL — HIGH (ref 0.5–1.3)
CREAT SERPL-MCNC: 1.99 MG/DL — HIGH (ref 0.5–1.3)
CREAT SERPL-MCNC: 2.03 MG/DL — HIGH (ref 0.5–1.3)
CREAT SERPL-MCNC: 2.07 MG/DL — HIGH (ref 0.5–1.3)
CREAT SERPL-MCNC: 2.14 MG/DL — HIGH (ref 0.5–1.3)
CREAT SERPL-MCNC: 2.19 MG/DL — HIGH (ref 0.5–1.3)
CREAT SERPL-MCNC: 2.38 MG/DL — HIGH (ref 0.5–1.3)
CREAT SERPL-MCNC: 2.38 MG/DL — HIGH (ref 0.5–1.3)
CREAT SERPL-MCNC: 2.52 MG/DL — HIGH (ref 0.5–1.3)
CREAT SERPL-MCNC: 3.12 MG/DL — HIGH (ref 0.5–1.3)
CREAT SERPL-MCNC: 3.44 MG/DL — HIGH (ref 0.5–1.3)
CREAT SERPL-MCNC: 3.72 MG/DL — HIGH (ref 0.5–1.3)
CREAT SERPL-MCNC: 3.73 MG/DL — HIGH (ref 0.5–1.3)
CRP SERPL-MCNC: 23.72 UG/ML — SIGNIFICANT CHANGE UP (ref 0–40)
CRP SERPL-MCNC: 28.76 UG/ML — SIGNIFICANT CHANGE UP (ref 0–40)
CULTURE RESULTS: NO GROWTH — SIGNIFICANT CHANGE UP
CULTURE RESULTS: NO GROWTH — SIGNIFICANT CHANGE UP
CULTURE RESULTS: SIGNIFICANT CHANGE UP
D DIMER BLD IA.RAPID-MCNC: 1274 NG/ML DDU — HIGH
D DIMER BLD IA.RAPID-MCNC: 925 NG/ML DDU — HIGH
D DIMER BLD IA.RAPID-MCNC: 930 NG/ML DDU — HIGH
DIFF PNL FLD: ABNORMAL
DIFF PNL FLD: NEGATIVE — SIGNIFICANT CHANGE UP
EOSINOPHIL # BLD AUTO: 0.17 K/UL — SIGNIFICANT CHANGE UP (ref 0–0.5)
EOSINOPHIL # BLD AUTO: 0.23 K/UL — SIGNIFICANT CHANGE UP (ref 0–0.5)
EOSINOPHIL # BLD AUTO: 0.26 K/UL — SIGNIFICANT CHANGE UP (ref 0–0.5)
EOSINOPHIL # BLD AUTO: 0.28 K/UL — SIGNIFICANT CHANGE UP (ref 0–0.5)
EOSINOPHIL # BLD AUTO: 0.36 K/UL — SIGNIFICANT CHANGE UP (ref 0–0.5)
EOSINOPHIL # BLD AUTO: 0.39 K/UL — SIGNIFICANT CHANGE UP (ref 0–0.5)
EOSINOPHIL # BLD AUTO: 0.54 K/UL — HIGH (ref 0–0.5)
EOSINOPHIL NFR BLD AUTO: 0.9 % — SIGNIFICANT CHANGE UP (ref 0–6)
EOSINOPHIL NFR BLD AUTO: 1.3 % — SIGNIFICANT CHANGE UP (ref 0–6)
EOSINOPHIL NFR BLD AUTO: 1.4 % — SIGNIFICANT CHANGE UP (ref 0–6)
EOSINOPHIL NFR BLD AUTO: 1.6 % — SIGNIFICANT CHANGE UP (ref 0–6)
EOSINOPHIL NFR BLD AUTO: 2.1 % — SIGNIFICANT CHANGE UP (ref 0–6)
EOSINOPHIL NFR BLD AUTO: 2.1 % — SIGNIFICANT CHANGE UP (ref 0–6)
EOSINOPHIL NFR BLD AUTO: 2.9 % — SIGNIFICANT CHANGE UP (ref 0–6)
EPI CELLS # UR: 0 — SIGNIFICANT CHANGE UP
EPI CELLS # UR: 3 — SIGNIFICANT CHANGE UP
ESTIMATED AVERAGE GLUCOSE: 194 MG/DL — HIGH (ref 68–114)
FERRITIN SERPL-MCNC: 321 NG/ML — SIGNIFICANT CHANGE UP (ref 30–400)
FERRITIN SERPL-MCNC: 324 NG/ML — SIGNIFICANT CHANGE UP (ref 30–400)
FIBRINOGEN PPP-MCNC: >1400 (ref 350–510)
GAS PNL BLDA: SIGNIFICANT CHANGE UP
GAS PNL BLDV: 139 MMOL/L — SIGNIFICANT CHANGE UP (ref 135–145)
GAS PNL BLDV: 142 MMOL/L — SIGNIFICANT CHANGE UP (ref 135–145)
GAS PNL BLDV: 143 MMOL/L — SIGNIFICANT CHANGE UP (ref 135–145)
GAS PNL BLDV: 146 MMOL/L — HIGH (ref 135–145)
GAS PNL BLDV: SIGNIFICANT CHANGE UP
GGT SERPL-CCNC: 72 U/L — HIGH (ref 9–50)
GLUCOSE BLDC GLUCOMTR-MCNC: 106 MG/DL — HIGH (ref 70–99)
GLUCOSE BLDC GLUCOMTR-MCNC: 114 MG/DL — HIGH (ref 70–99)
GLUCOSE BLDC GLUCOMTR-MCNC: 117 MG/DL — HIGH (ref 70–99)
GLUCOSE BLDC GLUCOMTR-MCNC: 118 MG/DL — HIGH (ref 70–99)
GLUCOSE BLDC GLUCOMTR-MCNC: 118 MG/DL — HIGH (ref 70–99)
GLUCOSE BLDC GLUCOMTR-MCNC: 121 MG/DL — HIGH (ref 70–99)
GLUCOSE BLDC GLUCOMTR-MCNC: 135 MG/DL — HIGH (ref 70–99)
GLUCOSE BLDC GLUCOMTR-MCNC: 136 MG/DL — HIGH (ref 70–99)
GLUCOSE BLDC GLUCOMTR-MCNC: 144 MG/DL — HIGH (ref 70–99)
GLUCOSE BLDC GLUCOMTR-MCNC: 147 MG/DL — HIGH (ref 70–99)
GLUCOSE BLDC GLUCOMTR-MCNC: 153 MG/DL — HIGH (ref 70–99)
GLUCOSE BLDC GLUCOMTR-MCNC: 153 MG/DL — HIGH (ref 70–99)
GLUCOSE BLDC GLUCOMTR-MCNC: 158 MG/DL — HIGH (ref 70–99)
GLUCOSE BLDC GLUCOMTR-MCNC: 161 MG/DL — HIGH (ref 70–99)
GLUCOSE BLDC GLUCOMTR-MCNC: 165 MG/DL — HIGH (ref 70–99)
GLUCOSE BLDC GLUCOMTR-MCNC: 175 MG/DL — HIGH (ref 70–99)
GLUCOSE BLDC GLUCOMTR-MCNC: 175 MG/DL — HIGH (ref 70–99)
GLUCOSE BLDC GLUCOMTR-MCNC: 176 MG/DL — HIGH (ref 70–99)
GLUCOSE BLDC GLUCOMTR-MCNC: 184 MG/DL — HIGH (ref 70–99)
GLUCOSE BLDC GLUCOMTR-MCNC: 185 MG/DL — HIGH (ref 70–99)
GLUCOSE BLDC GLUCOMTR-MCNC: 186 MG/DL — HIGH (ref 70–99)
GLUCOSE BLDC GLUCOMTR-MCNC: 187 MG/DL — HIGH (ref 70–99)
GLUCOSE BLDC GLUCOMTR-MCNC: 188 MG/DL — HIGH (ref 70–99)
GLUCOSE BLDC GLUCOMTR-MCNC: 189 MG/DL — HIGH (ref 70–99)
GLUCOSE BLDC GLUCOMTR-MCNC: 189 MG/DL — HIGH (ref 70–99)
GLUCOSE BLDC GLUCOMTR-MCNC: 191 MG/DL — HIGH (ref 70–99)
GLUCOSE BLDC GLUCOMTR-MCNC: 192 MG/DL — HIGH (ref 70–99)
GLUCOSE BLDC GLUCOMTR-MCNC: 197 MG/DL — HIGH (ref 70–99)
GLUCOSE BLDC GLUCOMTR-MCNC: 201 MG/DL — HIGH (ref 70–99)
GLUCOSE BLDC GLUCOMTR-MCNC: 204 MG/DL — HIGH (ref 70–99)
GLUCOSE BLDC GLUCOMTR-MCNC: 205 MG/DL — HIGH (ref 70–99)
GLUCOSE BLDC GLUCOMTR-MCNC: 205 MG/DL — HIGH (ref 70–99)
GLUCOSE BLDC GLUCOMTR-MCNC: 206 MG/DL — HIGH (ref 70–99)
GLUCOSE BLDC GLUCOMTR-MCNC: 207 MG/DL — HIGH (ref 70–99)
GLUCOSE BLDC GLUCOMTR-MCNC: 209 MG/DL — HIGH (ref 70–99)
GLUCOSE BLDC GLUCOMTR-MCNC: 210 MG/DL — HIGH (ref 70–99)
GLUCOSE BLDC GLUCOMTR-MCNC: 212 MG/DL — HIGH (ref 70–99)
GLUCOSE BLDC GLUCOMTR-MCNC: 217 MG/DL — HIGH (ref 70–99)
GLUCOSE BLDC GLUCOMTR-MCNC: 229 MG/DL — HIGH (ref 70–99)
GLUCOSE BLDC GLUCOMTR-MCNC: 232 MG/DL — HIGH (ref 70–99)
GLUCOSE BLDC GLUCOMTR-MCNC: 243 MG/DL — HIGH (ref 70–99)
GLUCOSE BLDC GLUCOMTR-MCNC: 244 MG/DL — HIGH (ref 70–99)
GLUCOSE BLDC GLUCOMTR-MCNC: 254 MG/DL — HIGH (ref 70–99)
GLUCOSE BLDC GLUCOMTR-MCNC: 256 MG/DL — HIGH (ref 70–99)
GLUCOSE BLDC GLUCOMTR-MCNC: 258 MG/DL — HIGH (ref 70–99)
GLUCOSE BLDC GLUCOMTR-MCNC: 267 MG/DL — HIGH (ref 70–99)
GLUCOSE BLDC GLUCOMTR-MCNC: 278 MG/DL — HIGH (ref 70–99)
GLUCOSE BLDC GLUCOMTR-MCNC: 295 MG/DL — HIGH (ref 70–99)
GLUCOSE BLDC GLUCOMTR-MCNC: 301 MG/DL — HIGH (ref 70–99)
GLUCOSE BLDC GLUCOMTR-MCNC: 303 MG/DL — HIGH (ref 70–99)
GLUCOSE BLDC GLUCOMTR-MCNC: 329 MG/DL — HIGH (ref 70–99)
GLUCOSE BLDC GLUCOMTR-MCNC: 342 MG/DL — HIGH (ref 70–99)
GLUCOSE BLDC GLUCOMTR-MCNC: 75 MG/DL — SIGNIFICANT CHANGE UP (ref 70–99)
GLUCOSE BLDC GLUCOMTR-MCNC: 80 MG/DL — SIGNIFICANT CHANGE UP (ref 70–99)
GLUCOSE BLDC GLUCOMTR-MCNC: 97 MG/DL — SIGNIFICANT CHANGE UP (ref 70–99)
GLUCOSE BLDV-MCNC: 192 MG/DL — HIGH (ref 70–99)
GLUCOSE BLDV-MCNC: 218 MG/DL — HIGH (ref 70–99)
GLUCOSE BLDV-MCNC: 286 MG/DL — HIGH (ref 70–99)
GLUCOSE BLDV-MCNC: 77 MG/DL — SIGNIFICANT CHANGE UP (ref 70–99)
GLUCOSE SERPL-MCNC: 112 MG/DL — HIGH (ref 70–99)
GLUCOSE SERPL-MCNC: 118 MG/DL — HIGH (ref 70–99)
GLUCOSE SERPL-MCNC: 138 MG/DL — HIGH (ref 70–99)
GLUCOSE SERPL-MCNC: 145 MG/DL — HIGH (ref 70–99)
GLUCOSE SERPL-MCNC: 148 MG/DL — HIGH (ref 70–99)
GLUCOSE SERPL-MCNC: 161 MG/DL — HIGH (ref 70–99)
GLUCOSE SERPL-MCNC: 166 MG/DL — HIGH (ref 70–99)
GLUCOSE SERPL-MCNC: 173 MG/DL — HIGH (ref 70–99)
GLUCOSE SERPL-MCNC: 187 MG/DL — HIGH (ref 70–99)
GLUCOSE SERPL-MCNC: 205 MG/DL — HIGH (ref 70–99)
GLUCOSE SERPL-MCNC: 205 MG/DL — HIGH (ref 70–99)
GLUCOSE SERPL-MCNC: 217 MG/DL — HIGH (ref 70–99)
GLUCOSE SERPL-MCNC: 218 MG/DL — HIGH (ref 70–99)
GLUCOSE SERPL-MCNC: 226 MG/DL — HIGH (ref 70–99)
GLUCOSE SERPL-MCNC: 230 MG/DL — HIGH (ref 70–99)
GLUCOSE SERPL-MCNC: 260 MG/DL — HIGH (ref 70–99)
GLUCOSE SERPL-MCNC: 260 MG/DL — HIGH (ref 70–99)
GLUCOSE SERPL-MCNC: 278 MG/DL — HIGH (ref 70–99)
GLUCOSE SERPL-MCNC: 309 MG/DL — HIGH (ref 70–99)
GLUCOSE SERPL-MCNC: 76 MG/DL — SIGNIFICANT CHANGE UP (ref 70–99)
GLUCOSE SERPL-MCNC: 81 MG/DL — SIGNIFICANT CHANGE UP (ref 70–99)
GLUCOSE UR QL: NEGATIVE — SIGNIFICANT CHANGE UP
GLUCOSE UR QL: NEGATIVE — SIGNIFICANT CHANGE UP
GRAM STN FLD: SIGNIFICANT CHANGE UP
HCO3 BLDA-SCNC: 24 MMOL/L — SIGNIFICANT CHANGE UP (ref 21–29)
HCO3 BLDV-SCNC: 23 MMOL/L — SIGNIFICANT CHANGE UP (ref 21–29)
HCO3 BLDV-SCNC: 26 MMOL/L — SIGNIFICANT CHANGE UP (ref 21–29)
HCT VFR BLD CALC: 25.2 % — LOW (ref 39–50)
HCT VFR BLD CALC: 25.5 % — LOW (ref 39–50)
HCT VFR BLD CALC: 25.8 % — LOW (ref 39–50)
HCT VFR BLD CALC: 26 % — LOW (ref 39–50)
HCT VFR BLD CALC: 26.3 % — LOW (ref 39–50)
HCT VFR BLD CALC: 27.5 % — LOW (ref 39–50)
HCT VFR BLD CALC: 32.9 % — LOW (ref 39–50)
HCT VFR BLD CALC: 32.9 % — LOW (ref 39–50)
HCT VFR BLD CALC: 33.2 % — LOW (ref 39–50)
HCT VFR BLD CALC: 33.6 % — LOW (ref 39–50)
HCT VFR BLD CALC: 33.7 % — LOW (ref 39–50)
HCT VFR BLD CALC: 34 % — LOW (ref 39–50)
HCT VFR BLD CALC: 34.3 % — LOW (ref 39–50)
HCT VFR BLD CALC: 35.5 % — LOW (ref 39–50)
HCT VFR BLDA CALC: 26 % — LOW (ref 39–50)
HCT VFR BLDA CALC: 26 % — LOW (ref 39–50)
HCT VFR BLDA CALC: 29 % — LOW (ref 39–50)
HCT VFR BLDA CALC: 34 % — LOW (ref 39–50)
HGB BLD CALC-MCNC: 11.1 G/DL — LOW (ref 13–17)
HGB BLD CALC-MCNC: 8.4 G/DL — LOW (ref 13–17)
HGB BLD CALC-MCNC: 8.4 G/DL — LOW (ref 13–17)
HGB BLD CALC-MCNC: 9.3 G/DL — LOW (ref 13–17)
HGB BLD-MCNC: 10.1 G/DL — LOW (ref 13–17)
HGB BLD-MCNC: 10.2 G/DL — LOW (ref 13–17)
HGB BLD-MCNC: 10.3 G/DL — LOW (ref 13–17)
HGB BLD-MCNC: 10.3 G/DL — LOW (ref 13–17)
HGB BLD-MCNC: 10.5 G/DL — LOW (ref 13–17)
HGB BLD-MCNC: 10.6 G/DL — LOW (ref 13–17)
HGB BLD-MCNC: 11 G/DL — LOW (ref 13–17)
HGB BLD-MCNC: 7.4 G/DL — LOW (ref 13–17)
HGB BLD-MCNC: 7.9 G/DL — LOW (ref 13–17)
HGB BLD-MCNC: 7.9 G/DL — LOW (ref 13–17)
HGB BLD-MCNC: 8 G/DL — LOW (ref 13–17)
HGB BLD-MCNC: 8.2 G/DL — LOW (ref 13–17)
HGB BLD-MCNC: 9 G/DL — LOW (ref 13–17)
HGB BLD-MCNC: 9.9 G/DL — LOW (ref 13–17)
HOROWITZ INDEX BLDA+IHG-RTO: 60 — SIGNIFICANT CHANGE UP
HOROWITZ INDEX BLDV+IHG-RTO: 30 — SIGNIFICANT CHANGE UP
HOROWITZ INDEX BLDV+IHG-RTO: 40 — SIGNIFICANT CHANGE UP
HYALINE CASTS # UR AUTO: 0 /LPF — SIGNIFICANT CHANGE UP (ref 0–7)
HYALINE CASTS # UR AUTO: 1 /LPF — SIGNIFICANT CHANGE UP (ref 0–7)
IMM GRANULOCYTES NFR BLD AUTO: 0.6 % — SIGNIFICANT CHANGE UP (ref 0–1.5)
IMM GRANULOCYTES NFR BLD AUTO: 0.7 % — SIGNIFICANT CHANGE UP (ref 0–1.5)
IMM GRANULOCYTES NFR BLD AUTO: 0.8 % — SIGNIFICANT CHANGE UP (ref 0–1.5)
IMM GRANULOCYTES NFR BLD AUTO: 0.8 % — SIGNIFICANT CHANGE UP (ref 0–1.5)
IMM GRANULOCYTES NFR BLD AUTO: 2.1 % — HIGH (ref 0–1.5)
INR BLD: 1.07 RATIO — SIGNIFICANT CHANGE UP (ref 0.88–1.16)
INR BLD: 1.16 RATIO — SIGNIFICANT CHANGE UP (ref 0.88–1.16)
INR BLD: 1.17 RATIO — HIGH (ref 0.88–1.16)
INR BLD: 1.18 RATIO — HIGH (ref 0.88–1.16)
INR BLD: 1.22 RATIO — HIGH (ref 0.88–1.16)
INR BLD: 1.25 RATIO — HIGH (ref 0.88–1.16)
INR BLD: 1.29 RATIO — HIGH (ref 0.88–1.16)
INR BLD: 1.4 RATIO — HIGH (ref 0.88–1.16)
INR BLD: 1.48 RATIO — HIGH (ref 0.88–1.16)
INR BLD: 1.48 RATIO — HIGH (ref 0.88–1.16)
INR BLD: 1.62 RATIO — HIGH (ref 0.88–1.16)
KETONES UR-MCNC: NEGATIVE — SIGNIFICANT CHANGE UP
KETONES UR-MCNC: SIGNIFICANT CHANGE UP
LACTATE BLDV-MCNC: 1.2 MMOL/L — SIGNIFICANT CHANGE UP (ref 0.7–2)
LACTATE BLDV-MCNC: 1.3 MMOL/L — SIGNIFICANT CHANGE UP (ref 0.7–2)
LACTATE BLDV-MCNC: 1.3 MMOL/L — SIGNIFICANT CHANGE UP (ref 0.7–2)
LACTATE BLDV-MCNC: 1.7 MMOL/L — SIGNIFICANT CHANGE UP (ref 0.7–2)
LEUKOCYTE ESTERASE UR-ACNC: ABNORMAL
LEUKOCYTE ESTERASE UR-ACNC: NEGATIVE — SIGNIFICANT CHANGE UP
LYMPHOCYTES # BLD AUTO: 0.81 K/UL — LOW (ref 1–3.3)
LYMPHOCYTES # BLD AUTO: 0.82 K/UL — LOW (ref 1–3.3)
LYMPHOCYTES # BLD AUTO: 0.83 K/UL — LOW (ref 1–3.3)
LYMPHOCYTES # BLD AUTO: 0.85 K/UL — LOW (ref 1–3.3)
LYMPHOCYTES # BLD AUTO: 0.92 K/UL — LOW (ref 1–3.3)
LYMPHOCYTES # BLD AUTO: 1.29 K/UL — SIGNIFICANT CHANGE UP (ref 1–3.3)
LYMPHOCYTES # BLD AUTO: 1.45 K/UL — SIGNIFICANT CHANGE UP (ref 1–3.3)
LYMPHOCYTES # BLD AUTO: 4.4 % — LOW (ref 13–44)
LYMPHOCYTES # BLD AUTO: 4.6 % — LOW (ref 13–44)
LYMPHOCYTES # BLD AUTO: 4.6 % — LOW (ref 13–44)
LYMPHOCYTES # BLD AUTO: 4.9 % — LOW (ref 13–44)
LYMPHOCYTES # BLD AUTO: 4.9 % — LOW (ref 13–44)
LYMPHOCYTES # BLD AUTO: 6.8 % — LOW (ref 13–44)
LYMPHOCYTES # BLD AUTO: 7.7 % — LOW (ref 13–44)
MAGNESIUM SERPL-MCNC: 2 MG/DL — SIGNIFICANT CHANGE UP (ref 1.6–2.6)
MAGNESIUM SERPL-MCNC: 2 MG/DL — SIGNIFICANT CHANGE UP (ref 1.6–2.6)
MAGNESIUM SERPL-MCNC: 2.1 MG/DL — SIGNIFICANT CHANGE UP (ref 1.6–2.6)
MAGNESIUM SERPL-MCNC: 2.2 MG/DL — SIGNIFICANT CHANGE UP (ref 1.6–2.6)
MAGNESIUM SERPL-MCNC: 2.3 MG/DL — SIGNIFICANT CHANGE UP (ref 1.6–2.6)
MAGNESIUM SERPL-MCNC: 2.4 MG/DL — SIGNIFICANT CHANGE UP (ref 1.6–2.6)
MAGNESIUM SERPL-MCNC: 2.6 MG/DL — SIGNIFICANT CHANGE UP (ref 1.6–2.6)
MCHC RBC-ENTMCNC: 28.5 PG — SIGNIFICANT CHANGE UP (ref 27–34)
MCHC RBC-ENTMCNC: 28.5 PG — SIGNIFICANT CHANGE UP (ref 27–34)
MCHC RBC-ENTMCNC: 28.6 PG — SIGNIFICANT CHANGE UP (ref 27–34)
MCHC RBC-ENTMCNC: 28.6 PG — SIGNIFICANT CHANGE UP (ref 27–34)
MCHC RBC-ENTMCNC: 28.8 PG — SIGNIFICANT CHANGE UP (ref 27–34)
MCHC RBC-ENTMCNC: 28.9 PG — SIGNIFICANT CHANGE UP (ref 27–34)
MCHC RBC-ENTMCNC: 29 GM/DL — LOW (ref 32–36)
MCHC RBC-ENTMCNC: 29 PG — SIGNIFICANT CHANGE UP (ref 27–34)
MCHC RBC-ENTMCNC: 29.1 PG — SIGNIFICANT CHANGE UP (ref 27–34)
MCHC RBC-ENTMCNC: 29.1 PG — SIGNIFICANT CHANGE UP (ref 27–34)
MCHC RBC-ENTMCNC: 29.2 PG — SIGNIFICANT CHANGE UP (ref 27–34)
MCHC RBC-ENTMCNC: 29.2 PG — SIGNIFICANT CHANGE UP (ref 27–34)
MCHC RBC-ENTMCNC: 29.4 GM/DL — LOW (ref 32–36)
MCHC RBC-ENTMCNC: 29.7 PG — SIGNIFICANT CHANGE UP (ref 27–34)
MCHC RBC-ENTMCNC: 30.1 GM/DL — LOW (ref 32–36)
MCHC RBC-ENTMCNC: 30.3 GM/DL — LOW (ref 32–36)
MCHC RBC-ENTMCNC: 30.4 GM/DL — LOW (ref 32–36)
MCHC RBC-ENTMCNC: 30.6 GM/DL — LOW (ref 32–36)
MCHC RBC-ENTMCNC: 30.6 GM/DL — LOW (ref 32–36)
MCHC RBC-ENTMCNC: 31 GM/DL — LOW (ref 32–36)
MCHC RBC-ENTMCNC: 31.2 GM/DL — LOW (ref 32–36)
MCHC RBC-ENTMCNC: 31.7 GM/DL — LOW (ref 32–36)
MCHC RBC-ENTMCNC: 32.2 GM/DL — SIGNIFICANT CHANGE UP (ref 32–36)
MCHC RBC-ENTMCNC: 32.7 GM/DL — SIGNIFICANT CHANGE UP (ref 32–36)
MCV RBC AUTO: 90.6 FL — SIGNIFICANT CHANGE UP (ref 80–100)
MCV RBC AUTO: 90.8 FL — SIGNIFICANT CHANGE UP (ref 80–100)
MCV RBC AUTO: 91 FL — SIGNIFICANT CHANGE UP (ref 80–100)
MCV RBC AUTO: 92.3 FL — SIGNIFICANT CHANGE UP (ref 80–100)
MCV RBC AUTO: 93 FL — SIGNIFICANT CHANGE UP (ref 80–100)
MCV RBC AUTO: 93.2 FL — SIGNIFICANT CHANGE UP (ref 80–100)
MCV RBC AUTO: 93.5 FL — SIGNIFICANT CHANGE UP (ref 80–100)
MCV RBC AUTO: 93.9 FL — SIGNIFICANT CHANGE UP (ref 80–100)
MCV RBC AUTO: 94.2 FL — SIGNIFICANT CHANGE UP (ref 80–100)
MCV RBC AUTO: 94.5 FL — SIGNIFICANT CHANGE UP (ref 80–100)
MCV RBC AUTO: 95.2 FL — SIGNIFICANT CHANGE UP (ref 80–100)
MCV RBC AUTO: 95.9 FL — SIGNIFICANT CHANGE UP (ref 80–100)
MCV RBC AUTO: 98.5 FL — SIGNIFICANT CHANGE UP (ref 80–100)
MCV RBC AUTO: 99.1 FL — SIGNIFICANT CHANGE UP (ref 80–100)
MONOCYTES # BLD AUTO: 0.78 K/UL — SIGNIFICANT CHANGE UP (ref 0–0.9)
MONOCYTES # BLD AUTO: 0.98 K/UL — HIGH (ref 0–0.9)
MONOCYTES # BLD AUTO: 1.1 K/UL — HIGH (ref 0–0.9)
MONOCYTES # BLD AUTO: 1.13 K/UL — HIGH (ref 0–0.9)
MONOCYTES # BLD AUTO: 1.23 K/UL — HIGH (ref 0–0.9)
MONOCYTES # BLD AUTO: 1.38 K/UL — HIGH (ref 0–0.9)
MONOCYTES # BLD AUTO: 1.49 K/UL — HIGH (ref 0–0.9)
MONOCYTES NFR BLD AUTO: 4.1 % — SIGNIFICANT CHANGE UP (ref 2–14)
MONOCYTES NFR BLD AUTO: 5.5 % — SIGNIFICANT CHANGE UP (ref 2–14)
MONOCYTES NFR BLD AUTO: 6.4 % — SIGNIFICANT CHANGE UP (ref 2–14)
MONOCYTES NFR BLD AUTO: 6.5 % — SIGNIFICANT CHANGE UP (ref 2–14)
MONOCYTES NFR BLD AUTO: 6.6 % — SIGNIFICANT CHANGE UP (ref 2–14)
MONOCYTES NFR BLD AUTO: 7.3 % — SIGNIFICANT CHANGE UP (ref 2–14)
MONOCYTES NFR BLD AUTO: 7.5 % — SIGNIFICANT CHANGE UP (ref 2–14)
NEUTROPHILS # BLD AUTO: 14.74 K/UL — HIGH (ref 1.8–7.4)
NEUTROPHILS # BLD AUTO: 14.77 K/UL — HIGH (ref 1.8–7.4)
NEUTROPHILS # BLD AUTO: 15.29 K/UL — HIGH (ref 1.8–7.4)
NEUTROPHILS # BLD AUTO: 15.5 K/UL — HIGH (ref 1.8–7.4)
NEUTROPHILS # BLD AUTO: 16 K/UL — HIGH (ref 1.8–7.4)
NEUTROPHILS # BLD AUTO: 16.03 K/UL — HIGH (ref 1.8–7.4)
NEUTROPHILS # BLD AUTO: 17.09 K/UL — HIGH (ref 1.8–7.4)
NEUTROPHILS NFR BLD AUTO: 80.9 % — HIGH (ref 43–77)
NEUTROPHILS NFR BLD AUTO: 84.4 % — HIGH (ref 43–77)
NEUTROPHILS NFR BLD AUTO: 85.2 % — HIGH (ref 43–77)
NEUTROPHILS NFR BLD AUTO: 85.7 % — HIGH (ref 43–77)
NEUTROPHILS NFR BLD AUTO: 86.4 % — HIGH (ref 43–77)
NEUTROPHILS NFR BLD AUTO: 86.5 % — HIGH (ref 43–77)
NEUTROPHILS NFR BLD AUTO: 87.4 % — HIGH (ref 43–77)
NITRITE UR-MCNC: NEGATIVE — SIGNIFICANT CHANGE UP
NITRITE UR-MCNC: NEGATIVE — SIGNIFICANT CHANGE UP
NRBC # BLD: 0 /100 WBCS — SIGNIFICANT CHANGE UP (ref 0–0)
NT-PROBNP SERPL-SCNC: 4371 PG/ML — HIGH (ref 0–300)
NT-PROBNP SERPL-SCNC: 4459 PG/ML — HIGH (ref 0–300)
OSMOLALITY UR: 338 MOSM/KG — SIGNIFICANT CHANGE UP (ref 50–1200)
OTHER CELLS CSF MANUAL: 7 ML/DL — LOW (ref 18–22)
OTHER CELLS CSF MANUAL: 7 ML/DL — LOW (ref 18–22)
OTHER CELLS CSF MANUAL: 9 ML/DL — LOW (ref 18–22)
PCO2 BLDA: 57 MMHG — HIGH (ref 32–46)
PCO2 BLDV: 38 MMHG — SIGNIFICANT CHANGE UP (ref 35–50)
PCO2 BLDV: 39 MMHG — SIGNIFICANT CHANGE UP (ref 35–50)
PCO2 BLDV: 40 MMHG — SIGNIFICANT CHANGE UP (ref 35–50)
PCO2 BLDV: 47 MMHG — SIGNIFICANT CHANGE UP (ref 35–50)
PH BLDA: 7.25 — LOW (ref 7.35–7.45)
PH BLDV: 7.37 — SIGNIFICANT CHANGE UP (ref 7.35–7.45)
PH BLDV: 7.37 — SIGNIFICANT CHANGE UP (ref 7.35–7.45)
PH BLDV: 7.39 — SIGNIFICANT CHANGE UP (ref 7.35–7.45)
PH BLDV: 7.4 — SIGNIFICANT CHANGE UP (ref 7.35–7.45)
PH UR: 5 — SIGNIFICANT CHANGE UP (ref 5–8)
PH UR: 6 — SIGNIFICANT CHANGE UP (ref 5–8)
PHOSPHATE SERPL-MCNC: 2.2 MG/DL — LOW (ref 2.5–4.5)
PHOSPHATE SERPL-MCNC: 2.3 MG/DL — LOW (ref 2.5–4.5)
PHOSPHATE SERPL-MCNC: 2.5 MG/DL — SIGNIFICANT CHANGE UP (ref 2.5–4.5)
PHOSPHATE SERPL-MCNC: 2.6 MG/DL — SIGNIFICANT CHANGE UP (ref 2.5–4.5)
PHOSPHATE SERPL-MCNC: 3 MG/DL — SIGNIFICANT CHANGE UP (ref 2.5–4.5)
PHOSPHATE SERPL-MCNC: 3.2 MG/DL — SIGNIFICANT CHANGE UP (ref 2.5–4.5)
PHOSPHATE SERPL-MCNC: 3.3 MG/DL — SIGNIFICANT CHANGE UP (ref 2.5–4.5)
PHOSPHATE SERPL-MCNC: 3.3 MG/DL — SIGNIFICANT CHANGE UP (ref 2.5–4.5)
PHOSPHATE SERPL-MCNC: 3.4 MG/DL — SIGNIFICANT CHANGE UP (ref 2.5–4.5)
PHOSPHATE SERPL-MCNC: 3.6 MG/DL — SIGNIFICANT CHANGE UP (ref 2.5–4.5)
PHOSPHATE SERPL-MCNC: 3.7 MG/DL — SIGNIFICANT CHANGE UP (ref 2.5–4.5)
PHOSPHATE SERPL-MCNC: 3.7 MG/DL — SIGNIFICANT CHANGE UP (ref 2.5–4.5)
PHOSPHATE SERPL-MCNC: 4.1 MG/DL — SIGNIFICANT CHANGE UP (ref 2.5–4.5)
PHOSPHATE SERPL-MCNC: 4.2 MG/DL — SIGNIFICANT CHANGE UP (ref 2.5–4.5)
PHOSPHATE SERPL-MCNC: 4.7 MG/DL — HIGH (ref 2.5–4.5)
PLATELET # BLD AUTO: 120 K/UL — LOW (ref 150–400)
PLATELET # BLD AUTO: 130 K/UL — LOW (ref 150–400)
PLATELET # BLD AUTO: 131 K/UL — LOW (ref 150–400)
PLATELET # BLD AUTO: 195 K/UL — SIGNIFICANT CHANGE UP (ref 150–400)
PLATELET # BLD AUTO: 197 K/UL — SIGNIFICANT CHANGE UP (ref 150–400)
PLATELET # BLD AUTO: 208 K/UL — SIGNIFICANT CHANGE UP (ref 150–400)
PLATELET # BLD AUTO: 228 K/UL — SIGNIFICANT CHANGE UP (ref 150–400)
PLATELET # BLD AUTO: 228 K/UL — SIGNIFICANT CHANGE UP (ref 150–400)
PLATELET # BLD AUTO: 236 K/UL — SIGNIFICANT CHANGE UP (ref 150–400)
PLATELET # BLD AUTO: 237 K/UL — SIGNIFICANT CHANGE UP (ref 150–400)
PLATELET # BLD AUTO: 240 K/UL — SIGNIFICANT CHANGE UP (ref 150–400)
PLATELET # BLD AUTO: 254 K/UL — SIGNIFICANT CHANGE UP (ref 150–400)
PLATELET # BLD AUTO: 296 K/UL — SIGNIFICANT CHANGE UP (ref 150–400)
PLATELET # BLD AUTO: 330 K/UL — SIGNIFICANT CHANGE UP (ref 150–400)
PO2 BLDA: 81 MMHG — SIGNIFICANT CHANGE UP (ref 74–108)
PO2 BLDV: 34 MMHG — SIGNIFICANT CHANGE UP (ref 25–45)
PO2 BLDV: 36 MMHG — SIGNIFICANT CHANGE UP (ref 25–45)
PO2 BLDV: 38 MMHG — SIGNIFICANT CHANGE UP (ref 25–45)
PO2 BLDV: 42 MMHG — SIGNIFICANT CHANGE UP (ref 25–45)
POTASSIUM BLDV-SCNC: 2.9 MMOL/L — CRITICAL LOW (ref 3.5–5.3)
POTASSIUM BLDV-SCNC: 3.1 MMOL/L — LOW (ref 3.5–5.3)
POTASSIUM BLDV-SCNC: 3.1 MMOL/L — LOW (ref 3.5–5.3)
POTASSIUM BLDV-SCNC: 3.3 MMOL/L — LOW (ref 3.5–5.3)
POTASSIUM SERPL-MCNC: 2.9 MMOL/L — CRITICAL LOW (ref 3.5–5.3)
POTASSIUM SERPL-MCNC: 3.2 MMOL/L — LOW (ref 3.5–5.3)
POTASSIUM SERPL-MCNC: 3.3 MMOL/L — LOW (ref 3.5–5.3)
POTASSIUM SERPL-MCNC: 3.3 MMOL/L — LOW (ref 3.5–5.3)
POTASSIUM SERPL-MCNC: 3.4 MMOL/L — LOW (ref 3.5–5.3)
POTASSIUM SERPL-MCNC: 3.4 MMOL/L — LOW (ref 3.5–5.3)
POTASSIUM SERPL-MCNC: 3.5 MMOL/L — SIGNIFICANT CHANGE UP (ref 3.5–5.3)
POTASSIUM SERPL-MCNC: 3.6 MMOL/L — SIGNIFICANT CHANGE UP (ref 3.5–5.3)
POTASSIUM SERPL-MCNC: 3.7 MMOL/L — SIGNIFICANT CHANGE UP (ref 3.5–5.3)
POTASSIUM SERPL-MCNC: 3.8 MMOL/L — SIGNIFICANT CHANGE UP (ref 3.5–5.3)
POTASSIUM SERPL-MCNC: 3.8 MMOL/L — SIGNIFICANT CHANGE UP (ref 3.5–5.3)
POTASSIUM SERPL-MCNC: 4.1 MMOL/L — SIGNIFICANT CHANGE UP (ref 3.5–5.3)
POTASSIUM SERPL-MCNC: 4.3 MMOL/L — SIGNIFICANT CHANGE UP (ref 3.5–5.3)
POTASSIUM SERPL-MCNC: 4.5 MMOL/L — SIGNIFICANT CHANGE UP (ref 3.5–5.3)
POTASSIUM SERPL-SCNC: 2.9 MMOL/L — CRITICAL LOW (ref 3.5–5.3)
POTASSIUM SERPL-SCNC: 3.2 MMOL/L — LOW (ref 3.5–5.3)
POTASSIUM SERPL-SCNC: 3.3 MMOL/L — LOW (ref 3.5–5.3)
POTASSIUM SERPL-SCNC: 3.3 MMOL/L — LOW (ref 3.5–5.3)
POTASSIUM SERPL-SCNC: 3.4 MMOL/L — LOW (ref 3.5–5.3)
POTASSIUM SERPL-SCNC: 3.4 MMOL/L — LOW (ref 3.5–5.3)
POTASSIUM SERPL-SCNC: 3.5 MMOL/L — SIGNIFICANT CHANGE UP (ref 3.5–5.3)
POTASSIUM SERPL-SCNC: 3.6 MMOL/L — SIGNIFICANT CHANGE UP (ref 3.5–5.3)
POTASSIUM SERPL-SCNC: 3.7 MMOL/L — SIGNIFICANT CHANGE UP (ref 3.5–5.3)
POTASSIUM SERPL-SCNC: 3.8 MMOL/L — SIGNIFICANT CHANGE UP (ref 3.5–5.3)
POTASSIUM SERPL-SCNC: 3.8 MMOL/L — SIGNIFICANT CHANGE UP (ref 3.5–5.3)
POTASSIUM SERPL-SCNC: 4.1 MMOL/L — SIGNIFICANT CHANGE UP (ref 3.5–5.3)
POTASSIUM SERPL-SCNC: 4.3 MMOL/L — SIGNIFICANT CHANGE UP (ref 3.5–5.3)
POTASSIUM SERPL-SCNC: 4.5 MMOL/L — SIGNIFICANT CHANGE UP (ref 3.5–5.3)
PROCALCITONIN SERPL-MCNC: 0.55 NG/ML — HIGH (ref 0.02–0.1)
PROCALCITONIN SERPL-MCNC: 0.8 NG/ML — HIGH (ref 0.02–0.1)
PROCALCITONIN SERPL-MCNC: 6.82 NG/ML — HIGH (ref 0.02–0.1)
PROCALCITONIN SERPL-MCNC: 7.44 NG/ML — HIGH (ref 0.02–0.1)
PROT SERPL-MCNC: 5.7 G/DL — LOW (ref 6–8.3)
PROT SERPL-MCNC: 5.8 G/DL — LOW (ref 6–8.3)
PROT SERPL-MCNC: 5.9 G/DL — LOW (ref 6–8.3)
PROT SERPL-MCNC: 6.1 G/DL — SIGNIFICANT CHANGE UP (ref 6–8.3)
PROT SERPL-MCNC: 6.2 G/DL — SIGNIFICANT CHANGE UP (ref 6–8.3)
PROT SERPL-MCNC: 6.3 G/DL — SIGNIFICANT CHANGE UP (ref 6–8.3)
PROT SERPL-MCNC: 6.3 G/DL — SIGNIFICANT CHANGE UP (ref 6–8.3)
PROT SERPL-MCNC: 6.5 G/DL — SIGNIFICANT CHANGE UP (ref 6–8.3)
PROT SERPL-MCNC: 6.6 G/DL — SIGNIFICANT CHANGE UP (ref 6–8.3)
PROT SERPL-MCNC: 6.7 G/DL — SIGNIFICANT CHANGE UP (ref 6–8.3)
PROT UR-MCNC: ABNORMAL
PROT UR-MCNC: ABNORMAL
PROTHROM AB SERPL-ACNC: 12.2 SEC — SIGNIFICANT CHANGE UP (ref 10–13.1)
PROTHROM AB SERPL-ACNC: 13.4 SEC — HIGH (ref 10–12.9)
PROTHROM AB SERPL-ACNC: 13.4 SEC — HIGH (ref 10–12.9)
PROTHROM AB SERPL-ACNC: 13.6 SEC — HIGH (ref 10–12.9)
PROTHROM AB SERPL-ACNC: 14.1 SEC — HIGH (ref 10–12.9)
PROTHROM AB SERPL-ACNC: 14.3 SEC — HIGH (ref 10–12.9)
PROTHROM AB SERPL-ACNC: 14.8 SEC — HIGH (ref 10–12.9)
PROTHROM AB SERPL-ACNC: 16.3 SEC — HIGH (ref 10–12.9)
PROTHROM AB SERPL-ACNC: 17.2 SEC — HIGH (ref 10–12.9)
PROTHROM AB SERPL-ACNC: 17.2 SEC — HIGH (ref 10–12.9)
PROTHROM AB SERPL-ACNC: 18.9 SEC — HIGH (ref 10–12.9)
PSA FLD-MCNC: 312 NG/ML — HIGH (ref 0–4)
RBC # BLD: 2.59 M/UL — LOW (ref 4.2–5.8)
RBC # BLD: 2.71 M/UL — LOW (ref 4.2–5.8)
RBC # BLD: 2.73 M/UL — LOW (ref 4.2–5.8)
RBC # BLD: 2.77 M/UL — LOW (ref 4.2–5.8)
RBC # BLD: 2.85 M/UL — LOW (ref 4.2–5.8)
RBC # BLD: 3.03 M/UL — LOW (ref 4.2–5.8)
RBC # BLD: 3.4 M/UL — LOW (ref 4.2–5.8)
RBC # BLD: 3.52 M/UL — LOW (ref 4.2–5.8)
RBC # BLD: 3.53 M/UL — LOW (ref 4.2–5.8)
RBC # BLD: 3.57 M/UL — LOW (ref 4.2–5.8)
RBC # BLD: 3.61 M/UL — LOW (ref 4.2–5.8)
RBC # BLD: 3.63 M/UL — LOW (ref 4.2–5.8)
RBC # BLD: 3.68 M/UL — LOW (ref 4.2–5.8)
RBC # BLD: 3.78 M/UL — LOW (ref 4.2–5.8)
RBC # FLD: 14.2 % — SIGNIFICANT CHANGE UP (ref 10.3–14.5)
RBC # FLD: 14.2 % — SIGNIFICANT CHANGE UP (ref 10.3–14.5)
RBC # FLD: 14.3 % — SIGNIFICANT CHANGE UP (ref 10.3–14.5)
RBC # FLD: 14.4 % — SIGNIFICANT CHANGE UP (ref 10.3–14.5)
RBC # FLD: 14.5 % — SIGNIFICANT CHANGE UP (ref 10.3–14.5)
RBC # FLD: 14.6 % — HIGH (ref 10.3–14.5)
RBC # FLD: 14.9 % — HIGH (ref 10.3–14.5)
RBC # FLD: 15.4 % — HIGH (ref 10.3–14.5)
RBC # FLD: 15.6 % — HIGH (ref 10.3–14.5)
RBC # FLD: 15.7 % — HIGH (ref 10.3–14.5)
RBC # FLD: 15.9 % — HIGH (ref 10.3–14.5)
RBC # FLD: 15.9 % — HIGH (ref 10.3–14.5)
RBC CASTS # UR COMP ASSIST: 0 /HPF — SIGNIFICANT CHANGE UP (ref 0–4)
RBC CASTS # UR COMP ASSIST: >50 /HPF — HIGH (ref 0–4)
RH IG SCN BLD-IMP: NEGATIVE — SIGNIFICANT CHANGE UP
SAO2 % BLDA: 96 % — SIGNIFICANT CHANGE UP (ref 92–96)
SAO2 % BLDV: 58 % — LOW (ref 67–88)
SAO2 % BLDV: 63 % — LOW (ref 67–88)
SAO2 % BLDV: 74 % — SIGNIFICANT CHANGE UP (ref 67–88)
SAO2 % BLDV: 75 % — SIGNIFICANT CHANGE UP (ref 67–88)
SARS-COV-2 RNA SPEC QL NAA+PROBE: SIGNIFICANT CHANGE UP
SODIUM SERPL-SCNC: 140 MMOL/L — SIGNIFICANT CHANGE UP (ref 135–145)
SODIUM SERPL-SCNC: 140 MMOL/L — SIGNIFICANT CHANGE UP (ref 135–145)
SODIUM SERPL-SCNC: 141 MMOL/L — SIGNIFICANT CHANGE UP (ref 135–145)
SODIUM SERPL-SCNC: 142 MMOL/L — SIGNIFICANT CHANGE UP (ref 135–145)
SODIUM SERPL-SCNC: 142 MMOL/L — SIGNIFICANT CHANGE UP (ref 135–145)
SODIUM SERPL-SCNC: 143 MMOL/L — SIGNIFICANT CHANGE UP (ref 135–145)
SODIUM SERPL-SCNC: 143 MMOL/L — SIGNIFICANT CHANGE UP (ref 135–145)
SODIUM SERPL-SCNC: 144 MMOL/L — SIGNIFICANT CHANGE UP (ref 135–145)
SODIUM SERPL-SCNC: 144 MMOL/L — SIGNIFICANT CHANGE UP (ref 135–145)
SODIUM SERPL-SCNC: 146 MMOL/L — HIGH (ref 135–145)
SODIUM SERPL-SCNC: 146 MMOL/L — HIGH (ref 135–145)
SODIUM SERPL-SCNC: 147 MMOL/L — HIGH (ref 135–145)
SODIUM SERPL-SCNC: 148 MMOL/L — HIGH (ref 135–145)
SODIUM SERPL-SCNC: 149 MMOL/L — HIGH (ref 135–145)
SODIUM SERPL-SCNC: 149 MMOL/L — HIGH (ref 135–145)
SODIUM SERPL-SCNC: 150 MMOL/L — HIGH (ref 135–145)
SODIUM SERPL-SCNC: 150 MMOL/L — HIGH (ref 135–145)
SODIUM SERPL-SCNC: 151 MMOL/L — HIGH (ref 135–145)
SODIUM SERPL-SCNC: 152 MMOL/L — HIGH (ref 135–145)
SODIUM SERPL-SCNC: 153 MMOL/L — HIGH (ref 135–145)
SODIUM SERPL-SCNC: 154 MMOL/L — HIGH (ref 135–145)
SODIUM UR-SCNC: <35 MMOL/L — SIGNIFICANT CHANGE UP
SP GR SPEC: 1.02 — SIGNIFICANT CHANGE UP (ref 1.01–1.02)
SP GR SPEC: 1.02 — SIGNIFICANT CHANGE UP (ref 1.01–1.02)
SPECIMEN SOURCE: SIGNIFICANT CHANGE UP
T PALLIDUM AB TITR SER: NEGATIVE — SIGNIFICANT CHANGE UP
TROPONIN T, HIGH SENSITIVITY RESULT: 126 NG/L — HIGH (ref 0–51)
TROPONIN T, HIGH SENSITIVITY RESULT: 128 NG/L — HIGH (ref 0–51)
TROPONIN T, HIGH SENSITIVITY RESULT: 80 NG/L — HIGH (ref 0–51)
TSH SERPL-MCNC: 0.95 UIU/ML — SIGNIFICANT CHANGE UP (ref 0.27–4.2)
URATE CRY FLD QL MICRO: ABNORMAL
UROBILINOGEN FLD QL: NEGATIVE — SIGNIFICANT CHANGE UP
UROBILINOGEN FLD QL: NEGATIVE — SIGNIFICANT CHANGE UP
UUN UR-MCNC: 341 MG/DL — SIGNIFICANT CHANGE UP
UUN UR-MCNC: 463 MG/DL — SIGNIFICANT CHANGE UP
VANCOMYCIN FLD-MCNC: 8.7 UG/ML — SIGNIFICANT CHANGE UP
VIT B12 SERPL-MCNC: 809 PG/ML — SIGNIFICANT CHANGE UP (ref 232–1245)
WBC # BLD: 10.75 K/UL — HIGH (ref 3.8–10.5)
WBC # BLD: 11.9 K/UL — HIGH (ref 3.8–10.5)
WBC # BLD: 12.23 K/UL — HIGH (ref 3.8–10.5)
WBC # BLD: 15.81 K/UL — HIGH (ref 3.8–10.5)
WBC # BLD: 16.67 K/UL — HIGH (ref 3.8–10.5)
WBC # BLD: 16.94 K/UL — HIGH (ref 3.8–10.5)
WBC # BLD: 17.11 K/UL — HIGH (ref 3.8–10.5)
WBC # BLD: 17.3 K/UL — HIGH (ref 3.8–10.5)
WBC # BLD: 17.41 K/UL — HIGH (ref 3.8–10.5)
WBC # BLD: 17.73 K/UL — HIGH (ref 3.8–10.5)
WBC # BLD: 18.55 K/UL — HIGH (ref 3.8–10.5)
WBC # BLD: 18.89 K/UL — HIGH (ref 3.8–10.5)
WBC # BLD: 18.95 K/UL — HIGH (ref 3.8–10.5)
WBC # BLD: 19.93 K/UL — HIGH (ref 3.8–10.5)
WBC # FLD AUTO: 10.75 K/UL — HIGH (ref 3.8–10.5)
WBC # FLD AUTO: 11.9 K/UL — HIGH (ref 3.8–10.5)
WBC # FLD AUTO: 12.23 K/UL — HIGH (ref 3.8–10.5)
WBC # FLD AUTO: 15.81 K/UL — HIGH (ref 3.8–10.5)
WBC # FLD AUTO: 16.67 K/UL — HIGH (ref 3.8–10.5)
WBC # FLD AUTO: 16.94 K/UL — HIGH (ref 3.8–10.5)
WBC # FLD AUTO: 17.11 K/UL — HIGH (ref 3.8–10.5)
WBC # FLD AUTO: 17.3 K/UL — HIGH (ref 3.8–10.5)
WBC # FLD AUTO: 17.41 K/UL — HIGH (ref 3.8–10.5)
WBC # FLD AUTO: 17.73 K/UL — HIGH (ref 3.8–10.5)
WBC # FLD AUTO: 18.55 K/UL — HIGH (ref 3.8–10.5)
WBC # FLD AUTO: 18.89 K/UL — HIGH (ref 3.8–10.5)
WBC # FLD AUTO: 18.95 K/UL — HIGH (ref 3.8–10.5)
WBC # FLD AUTO: 19.93 K/UL — HIGH (ref 3.8–10.5)
WBC UR QL: 2 /HPF — SIGNIFICANT CHANGE UP (ref 0–5)
WBC UR QL: 7 /HPF — HIGH (ref 0–5)

## 2020-01-01 PROCEDURE — 99232 SBSQ HOSP IP/OBS MODERATE 35: CPT | Mod: GC

## 2020-01-01 PROCEDURE — 72131 CT LUMBAR SPINE W/O DYE: CPT | Mod: 26

## 2020-01-01 PROCEDURE — 99233 SBSQ HOSP IP/OBS HIGH 50: CPT | Mod: GC

## 2020-01-01 PROCEDURE — 99232 SBSQ HOSP IP/OBS MODERATE 35: CPT

## 2020-01-01 PROCEDURE — 36620 INSERTION CATHETER ARTERY: CPT

## 2020-01-01 PROCEDURE — 71045 X-RAY EXAM CHEST 1 VIEW: CPT | Mod: 26

## 2020-01-01 PROCEDURE — 99291 CRITICAL CARE FIRST HOUR: CPT

## 2020-01-01 PROCEDURE — 93306 TTE W/DOPPLER COMPLETE: CPT | Mod: 26

## 2020-01-01 PROCEDURE — 99223 1ST HOSP IP/OBS HIGH 75: CPT | Mod: GC

## 2020-01-01 PROCEDURE — 93010 ELECTROCARDIOGRAM REPORT: CPT

## 2020-01-01 PROCEDURE — 72128 CT CHEST SPINE W/O DYE: CPT | Mod: 26

## 2020-01-01 PROCEDURE — 72146 MRI CHEST SPINE W/O DYE: CPT | Mod: 26

## 2020-01-01 PROCEDURE — 36556 INSERT NON-TUNNEL CV CATH: CPT

## 2020-01-01 PROCEDURE — 99222 1ST HOSP IP/OBS MODERATE 55: CPT | Mod: GC

## 2020-01-01 PROCEDURE — 99233 SBSQ HOSP IP/OBS HIGH 50: CPT

## 2020-01-01 PROCEDURE — 72192 CT PELVIS W/O DYE: CPT | Mod: 26

## 2020-01-01 PROCEDURE — 78580 LUNG PERFUSION IMAGING: CPT | Mod: 26

## 2020-01-01 PROCEDURE — 72148 MRI LUMBAR SPINE W/O DYE: CPT | Mod: 26

## 2020-01-01 PROCEDURE — 99447 NTRPROF PH1/NTRNET/EHR 11-20: CPT

## 2020-01-01 PROCEDURE — 93970 EXTREMITY STUDY: CPT | Mod: 26

## 2020-01-01 PROCEDURE — 99285 EMERGENCY DEPT VISIT HI MDM: CPT | Mod: GC

## 2020-01-01 PROCEDURE — 72125 CT NECK SPINE W/O DYE: CPT | Mod: 26

## 2020-01-01 PROCEDURE — 93308 TTE F-UP OR LMTD: CPT | Mod: 26

## 2020-01-01 PROCEDURE — 93321 DOPPLER ECHO F-UP/LMTD STD: CPT | Mod: 26

## 2020-01-01 PROCEDURE — 70450 CT HEAD/BRAIN W/O DYE: CPT | Mod: 26

## 2020-01-01 PROCEDURE — 36010 PLACE CATHETER IN VEIN: CPT

## 2020-01-01 PROCEDURE — 99223 1ST HOSP IP/OBS HIGH 75: CPT

## 2020-01-01 PROCEDURE — 76377 3D RENDER W/INTRP POSTPROCES: CPT | Mod: 26

## 2020-01-01 PROCEDURE — 99291 CRITICAL CARE FIRST HOUR: CPT | Mod: 25

## 2020-01-01 PROCEDURE — 72141 MRI NECK SPINE W/O DYE: CPT | Mod: 26

## 2020-01-01 PROCEDURE — 93010 ELECTROCARDIOGRAM REPORT: CPT | Mod: 77

## 2020-01-01 RX ORDER — SODIUM CHLORIDE 9 MG/ML
1000 INJECTION, SOLUTION INTRAVENOUS
Refills: 0 | Status: DISCONTINUED | OUTPATIENT
Start: 2020-01-01 | End: 2020-01-01

## 2020-01-01 RX ORDER — FUROSEMIDE 40 MG
20 TABLET ORAL ONCE
Refills: 0 | Status: COMPLETED | OUTPATIENT
Start: 2020-01-01 | End: 2020-01-01

## 2020-01-01 RX ORDER — INSULIN GLARGINE 100 [IU]/ML
25 INJECTION, SOLUTION SUBCUTANEOUS AT BEDTIME
Refills: 0 | Status: DISCONTINUED | OUTPATIENT
Start: 2020-01-01 | End: 2020-01-01

## 2020-01-01 RX ORDER — CHLORHEXIDINE GLUCONATE 213 G/1000ML
1 SOLUTION TOPICAL
Refills: 0 | Status: DISCONTINUED | OUTPATIENT
Start: 2020-01-01 | End: 2020-01-01

## 2020-01-01 RX ORDER — PIPERACILLIN AND TAZOBACTAM 4; .5 G/20ML; G/20ML
3.38 INJECTION, POWDER, LYOPHILIZED, FOR SOLUTION INTRAVENOUS EVERY 8 HOURS
Refills: 0 | Status: DISCONTINUED | OUTPATIENT
Start: 2020-01-01 | End: 2020-01-01

## 2020-01-01 RX ORDER — DEXTROSE 50 % IN WATER 50 %
15 SYRINGE (ML) INTRAVENOUS ONCE
Refills: 0 | Status: DISCONTINUED | OUTPATIENT
Start: 2020-01-01 | End: 2020-01-01

## 2020-01-01 RX ORDER — POTASSIUM CHLORIDE 20 MEQ
40 PACKET (EA) ORAL ONCE
Refills: 0 | Status: COMPLETED | OUTPATIENT
Start: 2020-01-01 | End: 2020-01-01

## 2020-01-01 RX ORDER — ENOXAPARIN SODIUM 100 MG/ML
40 INJECTION SUBCUTANEOUS DAILY
Refills: 0 | Status: DISCONTINUED | OUTPATIENT
Start: 2020-01-01 | End: 2020-01-01

## 2020-01-01 RX ORDER — GLUCAGON INJECTION, SOLUTION 0.5 MG/.1ML
1 INJECTION, SOLUTION SUBCUTANEOUS ONCE
Refills: 0 | Status: DISCONTINUED | OUTPATIENT
Start: 2020-01-01 | End: 2020-01-01

## 2020-01-01 RX ORDER — PIPERACILLIN AND TAZOBACTAM 4; .5 G/20ML; G/20ML
3.38 INJECTION, POWDER, LYOPHILIZED, FOR SOLUTION INTRAVENOUS ONCE
Refills: 0 | Status: COMPLETED | OUTPATIENT
Start: 2020-01-01 | End: 2020-01-01

## 2020-01-01 RX ORDER — DEXTROSE 50 % IN WATER 50 %
25 SYRINGE (ML) INTRAVENOUS ONCE
Refills: 0 | Status: DISCONTINUED | OUTPATIENT
Start: 2020-01-01 | End: 2020-01-01

## 2020-01-01 RX ORDER — PANTOPRAZOLE SODIUM 20 MG/1
40 TABLET, DELAYED RELEASE ORAL DAILY
Refills: 0 | Status: DISCONTINUED | OUTPATIENT
Start: 2020-01-01 | End: 2020-01-01

## 2020-01-01 RX ORDER — POTASSIUM CHLORIDE 20 MEQ
20 PACKET (EA) ORAL
Refills: 0 | Status: COMPLETED | OUTPATIENT
Start: 2020-01-01 | End: 2020-01-01

## 2020-01-01 RX ORDER — HEPARIN SODIUM 5000 [USP'U]/ML
INJECTION INTRAVENOUS; SUBCUTANEOUS
Qty: 25000 | Refills: 0 | Status: DISCONTINUED | OUTPATIENT
Start: 2020-01-01 | End: 2020-01-01

## 2020-01-01 RX ORDER — DEXTROSE 50 % IN WATER 50 %
12.5 SYRINGE (ML) INTRAVENOUS ONCE
Refills: 0 | Status: DISCONTINUED | OUTPATIENT
Start: 2020-01-01 | End: 2020-01-01

## 2020-01-01 RX ORDER — CARVEDILOL PHOSPHATE 80 MG/1
6.25 CAPSULE, EXTENDED RELEASE ORAL EVERY 12 HOURS
Refills: 0 | Status: DISCONTINUED | OUTPATIENT
Start: 2020-01-01 | End: 2020-01-01

## 2020-01-01 RX ORDER — FENTANYL CITRATE 50 UG/ML
25 INJECTION INTRAVENOUS ONCE
Refills: 0 | Status: DISCONTINUED | OUTPATIENT
Start: 2020-01-01 | End: 2020-01-01

## 2020-01-01 RX ORDER — CHLORHEXIDINE GLUCONATE 213 G/1000ML
15 SOLUTION TOPICAL EVERY 12 HOURS
Refills: 0 | Status: DISCONTINUED | OUTPATIENT
Start: 2020-01-01 | End: 2020-01-01

## 2020-01-01 RX ORDER — VANCOMYCIN HCL 1 G
1000 VIAL (EA) INTRAVENOUS ONCE
Refills: 0 | Status: COMPLETED | OUTPATIENT
Start: 2020-01-01 | End: 2020-01-01

## 2020-01-01 RX ORDER — FUROSEMIDE 40 MG
40 TABLET ORAL ONCE
Refills: 0 | Status: DISCONTINUED | OUTPATIENT
Start: 2020-01-01 | End: 2020-01-01

## 2020-01-01 RX ORDER — HUMAN INSULIN 100 [IU]/ML
4 INJECTION, SUSPENSION SUBCUTANEOUS ONCE
Refills: 0 | Status: COMPLETED | OUTPATIENT
Start: 2020-01-01 | End: 2020-01-01

## 2020-01-01 RX ORDER — HALOPERIDOL DECANOATE 100 MG/ML
1 INJECTION INTRAMUSCULAR EVERY 6 HOURS
Refills: 0 | Status: DISCONTINUED | OUTPATIENT
Start: 2020-01-01 | End: 2020-01-01

## 2020-01-01 RX ORDER — SENNA PLUS 8.6 MG/1
2 TABLET ORAL AT BEDTIME
Refills: 0 | Status: DISCONTINUED | OUTPATIENT
Start: 2020-01-01 | End: 2020-01-01

## 2020-01-01 RX ORDER — HEPARIN SODIUM 5000 [USP'U]/ML
9000 INJECTION INTRAVENOUS; SUBCUTANEOUS EVERY 6 HOURS
Refills: 0 | Status: DISCONTINUED | OUTPATIENT
Start: 2020-01-01 | End: 2020-01-01

## 2020-01-01 RX ORDER — INSULIN LISPRO 100/ML
VIAL (ML) SUBCUTANEOUS AT BEDTIME
Refills: 0 | Status: DISCONTINUED | OUTPATIENT
Start: 2020-01-01 | End: 2020-01-01

## 2020-01-01 RX ORDER — HUMAN INSULIN 100 [IU]/ML
10 INJECTION, SUSPENSION SUBCUTANEOUS EVERY 8 HOURS
Refills: 0 | Status: DISCONTINUED | OUTPATIENT
Start: 2020-01-01 | End: 2020-01-01

## 2020-01-01 RX ORDER — POTASSIUM CHLORIDE 20 MEQ
20 PACKET (EA) ORAL ONCE
Refills: 0 | Status: COMPLETED | OUTPATIENT
Start: 2020-01-01 | End: 2020-01-01

## 2020-01-01 RX ORDER — CALCIUM GLUCONATE 100 MG/ML
1 VIAL (ML) INTRAVENOUS ONCE
Refills: 0 | Status: DISCONTINUED | OUTPATIENT
Start: 2020-01-01 | End: 2020-01-01

## 2020-01-01 RX ORDER — AMLODIPINE BESYLATE 2.5 MG/1
5 TABLET ORAL DAILY
Refills: 0 | Status: DISCONTINUED | OUTPATIENT
Start: 2020-01-01 | End: 2020-01-01

## 2020-01-01 RX ORDER — POLYETHYLENE GLYCOL 3350 17 G/17G
17 POWDER, FOR SOLUTION ORAL DAILY
Refills: 0 | Status: DISCONTINUED | OUTPATIENT
Start: 2020-01-01 | End: 2020-01-01

## 2020-01-01 RX ORDER — POTASSIUM PHOSPHATE, MONOBASIC POTASSIUM PHOSPHATE, DIBASIC 236; 224 MG/ML; MG/ML
30 INJECTION, SOLUTION INTRAVENOUS ONCE
Refills: 0 | Status: COMPLETED | OUTPATIENT
Start: 2020-01-01 | End: 2020-01-01

## 2020-01-01 RX ORDER — INSULIN LISPRO 100/ML
VIAL (ML) SUBCUTANEOUS
Refills: 0 | Status: DISCONTINUED | OUTPATIENT
Start: 2020-01-01 | End: 2020-01-01

## 2020-01-01 RX ORDER — HEPARIN SODIUM 5000 [USP'U]/ML
1800 INJECTION INTRAVENOUS; SUBCUTANEOUS
Qty: 25000 | Refills: 0 | Status: DISCONTINUED | OUTPATIENT
Start: 2020-01-01 | End: 2020-01-01

## 2020-01-01 RX ORDER — QUETIAPINE FUMARATE 200 MG/1
50 TABLET, FILM COATED ORAL ONCE
Refills: 0 | Status: COMPLETED | OUTPATIENT
Start: 2020-01-01 | End: 2020-01-01

## 2020-01-01 RX ORDER — HUMAN INSULIN 100 [IU]/ML
5 INJECTION, SUSPENSION SUBCUTANEOUS ONCE
Refills: 0 | Status: COMPLETED | OUTPATIENT
Start: 2020-01-01 | End: 2020-01-01

## 2020-01-01 RX ORDER — INSULIN GLARGINE 100 [IU]/ML
15 INJECTION, SOLUTION SUBCUTANEOUS AT BEDTIME
Refills: 0 | Status: DISCONTINUED | OUTPATIENT
Start: 2020-01-01 | End: 2020-01-01

## 2020-01-01 RX ORDER — SODIUM CHLORIDE 9 MG/ML
10 INJECTION INTRAMUSCULAR; INTRAVENOUS; SUBCUTANEOUS
Refills: 0 | Status: DISCONTINUED | OUTPATIENT
Start: 2020-01-01 | End: 2020-01-01

## 2020-01-01 RX ORDER — HEPARIN SODIUM 5000 [USP'U]/ML
1400 INJECTION INTRAVENOUS; SUBCUTANEOUS
Qty: 25000 | Refills: 0 | Status: DISCONTINUED | OUTPATIENT
Start: 2020-01-01 | End: 2020-01-01

## 2020-01-01 RX ORDER — FUROSEMIDE 40 MG
40 TABLET ORAL ONCE
Refills: 0 | Status: COMPLETED | OUTPATIENT
Start: 2020-01-01 | End: 2020-01-01

## 2020-01-01 RX ORDER — HUMAN INSULIN 100 [IU]/ML
12 INJECTION, SUSPENSION SUBCUTANEOUS EVERY 6 HOURS
Refills: 0 | Status: DISCONTINUED | OUTPATIENT
Start: 2020-01-01 | End: 2020-01-01

## 2020-01-01 RX ORDER — ACETAMINOPHEN 500 MG
1000 TABLET ORAL ONCE
Refills: 0 | Status: COMPLETED | OUTPATIENT
Start: 2020-01-01 | End: 2020-01-01

## 2020-01-01 RX ORDER — DEXMEDETOMIDINE HYDROCHLORIDE IN 0.9% SODIUM CHLORIDE 4 UG/ML
0.1 INJECTION INTRAVENOUS
Qty: 200 | Refills: 0 | Status: DISCONTINUED | OUTPATIENT
Start: 2020-01-01 | End: 2020-01-01

## 2020-01-01 RX ORDER — NOREPINEPHRINE BITARTRATE/D5W 8 MG/250ML
0.05 PLASTIC BAG, INJECTION (ML) INTRAVENOUS
Qty: 8 | Refills: 0 | Status: DISCONTINUED | OUTPATIENT
Start: 2020-01-01 | End: 2020-01-01

## 2020-01-01 RX ORDER — VASOPRESSIN 20 [USP'U]/ML
0.03 INJECTION INTRAVENOUS
Qty: 50 | Refills: 0 | Status: DISCONTINUED | OUTPATIENT
Start: 2020-01-01 | End: 2020-01-01

## 2020-01-01 RX ORDER — POTASSIUM PHOSPHATE, MONOBASIC POTASSIUM PHOSPHATE, DIBASIC 236; 224 MG/ML; MG/ML
15 INJECTION, SOLUTION INTRAVENOUS ONCE
Refills: 0 | Status: COMPLETED | OUTPATIENT
Start: 2020-01-01 | End: 2020-01-01

## 2020-01-01 RX ORDER — HEPARIN SODIUM 5000 [USP'U]/ML
1500 INJECTION INTRAVENOUS; SUBCUTANEOUS
Qty: 25000 | Refills: 0 | Status: DISCONTINUED | OUTPATIENT
Start: 2020-01-01 | End: 2020-01-01

## 2020-01-01 RX ORDER — POTASSIUM CHLORIDE 20 MEQ
10 PACKET (EA) ORAL
Refills: 0 | Status: DISCONTINUED | OUTPATIENT
Start: 2020-01-01 | End: 2020-01-01

## 2020-01-01 RX ORDER — POLYETHYLENE GLYCOL 3350 17 G/17G
17 POWDER, FOR SOLUTION ORAL
Refills: 0 | Status: DISCONTINUED | OUTPATIENT
Start: 2020-01-01 | End: 2020-01-01

## 2020-01-01 RX ORDER — HEPARIN SODIUM 5000 [USP'U]/ML
5000 INJECTION INTRAVENOUS; SUBCUTANEOUS EVERY 8 HOURS
Refills: 0 | Status: DISCONTINUED | OUTPATIENT
Start: 2020-01-01 | End: 2020-01-01

## 2020-01-01 RX ORDER — POTASSIUM CHLORIDE 20 MEQ
10 PACKET (EA) ORAL
Refills: 0 | Status: COMPLETED | OUTPATIENT
Start: 2020-01-01 | End: 2020-01-01

## 2020-01-01 RX ORDER — HUMAN INSULIN 100 [IU]/ML
14 INJECTION, SUSPENSION SUBCUTANEOUS EVERY 6 HOURS
Refills: 0 | Status: DISCONTINUED | OUTPATIENT
Start: 2020-01-01 | End: 2020-01-01

## 2020-01-01 RX ORDER — DEXTROSE 50 % IN WATER 50 %
25 SYRINGE (ML) INTRAVENOUS ONCE
Refills: 0 | Status: COMPLETED | OUTPATIENT
Start: 2020-01-01 | End: 2020-01-01

## 2020-01-01 RX ORDER — ACETAMINOPHEN 500 MG
650 TABLET ORAL EVERY 6 HOURS
Refills: 0 | Status: DISCONTINUED | OUTPATIENT
Start: 2020-01-01 | End: 2020-01-01

## 2020-01-01 RX ORDER — FLUCONAZOLE 150 MG/1
400 TABLET ORAL ONCE
Refills: 0 | Status: COMPLETED | OUTPATIENT
Start: 2020-01-01 | End: 2020-01-01

## 2020-01-01 RX ORDER — INSULIN LISPRO 100/ML
VIAL (ML) SUBCUTANEOUS EVERY 4 HOURS
Refills: 0 | Status: DISCONTINUED | OUTPATIENT
Start: 2020-01-01 | End: 2020-01-01

## 2020-01-01 RX ORDER — SODIUM CHLORIDE 9 MG/ML
500 INJECTION INTRAMUSCULAR; INTRAVENOUS; SUBCUTANEOUS ONCE
Refills: 0 | Status: COMPLETED | OUTPATIENT
Start: 2020-01-01 | End: 2020-01-01

## 2020-01-01 RX ORDER — ERYTHROPOIETIN 10000 [IU]/ML
10000 INJECTION, SOLUTION INTRAVENOUS; SUBCUTANEOUS
Refills: 0 | Status: DISCONTINUED | OUTPATIENT
Start: 2020-01-01 | End: 2020-01-01

## 2020-01-01 RX ORDER — HUMAN INSULIN 100 [IU]/ML
10 INJECTION, SUSPENSION SUBCUTANEOUS EVERY 6 HOURS
Refills: 0 | Status: DISCONTINUED | OUTPATIENT
Start: 2020-01-01 | End: 2020-01-01

## 2020-01-01 RX ORDER — HYDROMORPHONE HYDROCHLORIDE 2 MG/ML
0.5 INJECTION INTRAMUSCULAR; INTRAVENOUS; SUBCUTANEOUS
Refills: 0 | Status: DISCONTINUED | OUTPATIENT
Start: 2020-01-01 | End: 2020-01-01

## 2020-01-01 RX ORDER — POTASSIUM CHLORIDE 20 MEQ
40 PACKET (EA) ORAL
Refills: 0 | Status: COMPLETED | OUTPATIENT
Start: 2020-01-01 | End: 2020-01-01

## 2020-01-01 RX ORDER — HEPARIN SODIUM 5000 [USP'U]/ML
4000 INJECTION INTRAVENOUS; SUBCUTANEOUS EVERY 6 HOURS
Refills: 0 | Status: DISCONTINUED | OUTPATIENT
Start: 2020-01-01 | End: 2020-01-01

## 2020-01-01 RX ORDER — ALPRAZOLAM 0.25 MG
0.5 TABLET ORAL ONCE
Refills: 0 | Status: DISCONTINUED | OUTPATIENT
Start: 2020-01-01 | End: 2020-01-01

## 2020-01-01 RX ORDER — INSULIN LISPRO 100/ML
7 VIAL (ML) SUBCUTANEOUS
Refills: 0 | Status: DISCONTINUED | OUTPATIENT
Start: 2020-01-01 | End: 2020-01-01

## 2020-01-01 RX ORDER — HUMAN INSULIN 100 [IU]/ML
8 INJECTION, SUSPENSION SUBCUTANEOUS EVERY 12 HOURS
Refills: 0 | Status: DISCONTINUED | OUTPATIENT
Start: 2020-01-01 | End: 2020-01-01

## 2020-01-01 RX ORDER — INSULIN HUMAN 100 [IU]/ML
10 INJECTION, SOLUTION SUBCUTANEOUS ONCE
Refills: 0 | Status: DISCONTINUED | OUTPATIENT
Start: 2020-01-01 | End: 2020-01-01

## 2020-01-01 RX ORDER — DEXTROSE 50 % IN WATER 50 %
50 SYRINGE (ML) INTRAVENOUS ONCE
Refills: 0 | Status: DISCONTINUED | OUTPATIENT
Start: 2020-01-01 | End: 2020-01-01

## 2020-01-01 RX ORDER — HALOPERIDOL DECANOATE 100 MG/ML
1 INJECTION INTRAMUSCULAR ONCE
Refills: 0 | Status: COMPLETED | OUTPATIENT
Start: 2020-01-01 | End: 2020-01-01

## 2020-01-01 RX ORDER — FUROSEMIDE 40 MG
20 TABLET ORAL DAILY
Refills: 0 | Status: DISCONTINUED | OUTPATIENT
Start: 2020-01-01 | End: 2020-01-01

## 2020-01-01 RX ORDER — MAGNESIUM SULFATE 500 MG/ML
2 VIAL (ML) INJECTION ONCE
Refills: 0 | Status: COMPLETED | OUTPATIENT
Start: 2020-01-01 | End: 2020-01-01

## 2020-01-01 RX ORDER — SODIUM CHLORIDE 9 MG/ML
1000 INJECTION INTRAMUSCULAR; INTRAVENOUS; SUBCUTANEOUS
Refills: 0 | Status: DISCONTINUED | OUTPATIENT
Start: 2020-01-01 | End: 2020-01-01

## 2020-01-01 RX ORDER — HUMAN INSULIN 100 [IU]/ML
8 INJECTION, SUSPENSION SUBCUTANEOUS EVERY 6 HOURS
Refills: 0 | Status: DISCONTINUED | OUTPATIENT
Start: 2020-01-01 | End: 2020-01-01

## 2020-01-01 RX ORDER — HUMAN INSULIN 100 [IU]/ML
4 INJECTION, SUSPENSION SUBCUTANEOUS EVERY 12 HOURS
Refills: 0 | Status: DISCONTINUED | OUTPATIENT
Start: 2020-01-01 | End: 2020-01-01

## 2020-01-01 RX ADMIN — CHLORHEXIDINE GLUCONATE 15 MILLILITER(S): 213 SOLUTION TOPICAL at 01:41

## 2020-01-01 RX ADMIN — HYDROMORPHONE HYDROCHLORIDE 0.5 MILLIGRAM(S): 2 INJECTION INTRAMUSCULAR; INTRAVENOUS; SUBCUTANEOUS at 15:40

## 2020-01-01 RX ADMIN — POTASSIUM PHOSPHATE, MONOBASIC POTASSIUM PHOSPHATE, DIBASIC 62.5 MILLIMOLE(S): 236; 224 INJECTION, SOLUTION INTRAVENOUS at 06:52

## 2020-01-01 RX ADMIN — HEPARIN SODIUM 1900 UNIT(S)/HR: 5000 INJECTION INTRAVENOUS; SUBCUTANEOUS at 21:36

## 2020-01-01 RX ADMIN — HALOPERIDOL DECANOATE 1 MILLIGRAM(S): 100 INJECTION INTRAMUSCULAR at 23:19

## 2020-01-01 RX ADMIN — HEPARIN SODIUM 1500 UNIT(S)/HR: 5000 INJECTION INTRAVENOUS; SUBCUTANEOUS at 11:59

## 2020-01-01 RX ADMIN — Medication 6: at 09:18

## 2020-01-01 RX ADMIN — Medication 100 MILLIEQUIVALENT(S): at 05:12

## 2020-01-01 RX ADMIN — Medication 2: at 11:26

## 2020-01-01 RX ADMIN — HEPARIN SODIUM 15 UNIT(S)/HR: 5000 INJECTION INTRAVENOUS; SUBCUTANEOUS at 11:17

## 2020-01-01 RX ADMIN — Medication 4: at 14:22

## 2020-01-01 RX ADMIN — Medication 2: at 14:04

## 2020-01-01 RX ADMIN — PIPERACILLIN AND TAZOBACTAM 25 GRAM(S): 4; .5 INJECTION, POWDER, LYOPHILIZED, FOR SOLUTION INTRAVENOUS at 14:08

## 2020-01-01 RX ADMIN — Medication 2: at 13:47

## 2020-01-01 RX ADMIN — PIPERACILLIN AND TAZOBACTAM 25 GRAM(S): 4; .5 INJECTION, POWDER, LYOPHILIZED, FOR SOLUTION INTRAVENOUS at 14:09

## 2020-01-01 RX ADMIN — Medication 50 GRAM(S): at 19:23

## 2020-01-01 RX ADMIN — FENTANYL CITRATE 25 MICROGRAM(S): 50 INJECTION INTRAVENOUS at 23:04

## 2020-01-01 RX ADMIN — Medication 4: at 17:24

## 2020-01-01 RX ADMIN — PANTOPRAZOLE SODIUM 40 MILLIGRAM(S): 20 TABLET, DELAYED RELEASE ORAL at 13:25

## 2020-01-01 RX ADMIN — HUMAN INSULIN 8 UNIT(S): 100 INJECTION, SUSPENSION SUBCUTANEOUS at 13:26

## 2020-01-01 RX ADMIN — AMLODIPINE BESYLATE 5 MILLIGRAM(S): 2.5 TABLET ORAL at 21:55

## 2020-01-01 RX ADMIN — Medication 100 MILLIEQUIVALENT(S): at 19:44

## 2020-01-01 RX ADMIN — Medication 20 MILLIGRAM(S): at 05:05

## 2020-01-01 RX ADMIN — HUMAN INSULIN 5 UNIT(S): 100 INJECTION, SUSPENSION SUBCUTANEOUS at 03:08

## 2020-01-01 RX ADMIN — Medication 1: at 17:17

## 2020-01-01 RX ADMIN — HUMAN INSULIN 4 UNIT(S): 100 INJECTION, SUSPENSION SUBCUTANEOUS at 03:19

## 2020-01-01 RX ADMIN — Medication 100 MILLIEQUIVALENT(S): at 16:33

## 2020-01-01 RX ADMIN — HYDROMORPHONE HYDROCHLORIDE 0.5 MILLIGRAM(S): 2 INJECTION INTRAMUSCULAR; INTRAVENOUS; SUBCUTANEOUS at 16:15

## 2020-01-01 RX ADMIN — Medication 8: at 03:08

## 2020-01-01 RX ADMIN — Medication 0.5 MILLIGRAM(S): at 15:35

## 2020-01-01 RX ADMIN — Medication 2: at 17:10

## 2020-01-01 RX ADMIN — PIPERACILLIN AND TAZOBACTAM 25 GRAM(S): 4; .5 INJECTION, POWDER, LYOPHILIZED, FOR SOLUTION INTRAVENOUS at 05:40

## 2020-01-01 RX ADMIN — Medication 9.99 MICROGRAM(S)/KG/MIN: at 19:15

## 2020-01-01 RX ADMIN — HYDROMORPHONE HYDROCHLORIDE 0.5 MILLIGRAM(S): 2 INJECTION INTRAMUSCULAR; INTRAVENOUS; SUBCUTANEOUS at 23:50

## 2020-01-01 RX ADMIN — DEXMEDETOMIDINE HYDROCHLORIDE IN 0.9% SODIUM CHLORIDE 2.67 MICROGRAM(S)/KG/HR: 4 INJECTION INTRAVENOUS at 19:45

## 2020-01-01 RX ADMIN — Medication 4: at 21:48

## 2020-01-01 RX ADMIN — HYDROMORPHONE HYDROCHLORIDE 0.5 MILLIGRAM(S): 2 INJECTION INTRAMUSCULAR; INTRAVENOUS; SUBCUTANEOUS at 00:20

## 2020-01-01 RX ADMIN — SENNA PLUS 2 TABLET(S): 8.6 TABLET ORAL at 21:25

## 2020-01-01 RX ADMIN — CARVEDILOL PHOSPHATE 6.25 MILLIGRAM(S): 80 CAPSULE, EXTENDED RELEASE ORAL at 05:14

## 2020-01-01 RX ADMIN — Medication 6: at 01:40

## 2020-01-01 RX ADMIN — HYDROMORPHONE HYDROCHLORIDE 0.5 MILLIGRAM(S): 2 INJECTION INTRAMUSCULAR; INTRAVENOUS; SUBCUTANEOUS at 11:15

## 2020-01-01 RX ADMIN — PIPERACILLIN AND TAZOBACTAM 25 GRAM(S): 4; .5 INJECTION, POWDER, LYOPHILIZED, FOR SOLUTION INTRAVENOUS at 05:17

## 2020-01-01 RX ADMIN — PIPERACILLIN AND TAZOBACTAM 25 GRAM(S): 4; .5 INJECTION, POWDER, LYOPHILIZED, FOR SOLUTION INTRAVENOUS at 12:55

## 2020-01-01 RX ADMIN — Medication 100 MILLIEQUIVALENT(S): at 17:02

## 2020-01-01 RX ADMIN — Medication 40 MILLIEQUIVALENT(S): at 17:34

## 2020-01-01 RX ADMIN — PANTOPRAZOLE SODIUM 40 MILLIGRAM(S): 20 TABLET, DELAYED RELEASE ORAL at 12:55

## 2020-01-01 RX ADMIN — HALOPERIDOL DECANOATE 1 MILLIGRAM(S): 100 INJECTION INTRAMUSCULAR at 05:16

## 2020-01-01 RX ADMIN — HUMAN INSULIN 14 UNIT(S): 100 INJECTION, SUSPENSION SUBCUTANEOUS at 17:11

## 2020-01-01 RX ADMIN — VASOPRESSIN 1.8 UNIT(S)/MIN: 20 INJECTION INTRAVENOUS at 03:15

## 2020-01-01 RX ADMIN — PIPERACILLIN AND TAZOBACTAM 25 GRAM(S): 4; .5 INJECTION, POWDER, LYOPHILIZED, FOR SOLUTION INTRAVENOUS at 21:54

## 2020-01-01 RX ADMIN — HALOPERIDOL DECANOATE 1 MILLIGRAM(S): 100 INJECTION INTRAMUSCULAR at 04:27

## 2020-01-01 RX ADMIN — PIPERACILLIN AND TAZOBACTAM 25 GRAM(S): 4; .5 INJECTION, POWDER, LYOPHILIZED, FOR SOLUTION INTRAVENOUS at 22:26

## 2020-01-01 RX ADMIN — Medication 4: at 10:34

## 2020-01-01 RX ADMIN — HUMAN INSULIN 4 UNIT(S): 100 INJECTION, SUSPENSION SUBCUTANEOUS at 22:26

## 2020-01-01 RX ADMIN — POTASSIUM PHOSPHATE, MONOBASIC POTASSIUM PHOSPHATE, DIBASIC 83.33 MILLIMOLE(S): 236; 224 INJECTION, SOLUTION INTRAVENOUS at 05:47

## 2020-01-01 RX ADMIN — POLYETHYLENE GLYCOL 3350 17 GRAM(S): 17 POWDER, FOR SOLUTION ORAL at 11:37

## 2020-01-01 RX ADMIN — HUMAN INSULIN 14 UNIT(S): 100 INJECTION, SUSPENSION SUBCUTANEOUS at 23:04

## 2020-01-01 RX ADMIN — HEPARIN SODIUM 5000 UNIT(S): 5000 INJECTION INTRAVENOUS; SUBCUTANEOUS at 05:14

## 2020-01-01 RX ADMIN — Medication 1: at 17:13

## 2020-01-01 RX ADMIN — QUETIAPINE FUMARATE 50 MILLIGRAM(S): 200 TABLET, FILM COATED ORAL at 02:15

## 2020-01-01 RX ADMIN — Medication 100 MILLIEQUIVALENT(S): at 03:07

## 2020-01-01 RX ADMIN — Medication 2: at 21:23

## 2020-01-01 RX ADMIN — Medication 40 MILLIEQUIVALENT(S): at 03:42

## 2020-01-01 RX ADMIN — Medication 4: at 17:37

## 2020-01-01 RX ADMIN — Medication 400 MILLIGRAM(S): at 04:45

## 2020-01-01 RX ADMIN — CHLORHEXIDINE GLUCONATE 15 MILLILITER(S): 213 SOLUTION TOPICAL at 17:36

## 2020-01-01 RX ADMIN — PIPERACILLIN AND TAZOBACTAM 25 GRAM(S): 4; .5 INJECTION, POWDER, LYOPHILIZED, FOR SOLUTION INTRAVENOUS at 21:23

## 2020-01-01 RX ADMIN — DEXMEDETOMIDINE HYDROCHLORIDE IN 0.9% SODIUM CHLORIDE 2.67 MICROGRAM(S)/KG/HR: 4 INJECTION INTRAVENOUS at 03:15

## 2020-01-01 RX ADMIN — HEPARIN SODIUM 5000 UNIT(S): 5000 INJECTION INTRAVENOUS; SUBCUTANEOUS at 21:33

## 2020-01-01 RX ADMIN — Medication 6: at 03:19

## 2020-01-01 RX ADMIN — POLYETHYLENE GLYCOL 3350 17 GRAM(S): 17 POWDER, FOR SOLUTION ORAL at 17:17

## 2020-01-01 RX ADMIN — HUMAN INSULIN 4 UNIT(S): 100 INJECTION, SUSPENSION SUBCUTANEOUS at 17:24

## 2020-01-01 RX ADMIN — HALOPERIDOL DECANOATE 1 MILLIGRAM(S): 100 INJECTION INTRAMUSCULAR at 15:26

## 2020-01-01 RX ADMIN — PANTOPRAZOLE SODIUM 40 MILLIGRAM(S): 20 TABLET, DELAYED RELEASE ORAL at 14:37

## 2020-01-01 RX ADMIN — CARVEDILOL PHOSPHATE 6.25 MILLIGRAM(S): 80 CAPSULE, EXTENDED RELEASE ORAL at 21:33

## 2020-01-01 RX ADMIN — VASOPRESSIN 1.8 UNIT(S)/MIN: 20 INJECTION INTRAVENOUS at 19:46

## 2020-01-01 RX ADMIN — PIPERACILLIN AND TAZOBACTAM 25 GRAM(S): 4; .5 INJECTION, POWDER, LYOPHILIZED, FOR SOLUTION INTRAVENOUS at 05:12

## 2020-01-01 RX ADMIN — Medication 250 MILLIGRAM(S): at 19:43

## 2020-01-01 RX ADMIN — AMLODIPINE BESYLATE 5 MILLIGRAM(S): 2.5 TABLET ORAL at 05:05

## 2020-01-01 RX ADMIN — HUMAN INSULIN 10 UNIT(S): 100 INJECTION, SUSPENSION SUBCUTANEOUS at 05:23

## 2020-01-01 RX ADMIN — HYDROMORPHONE HYDROCHLORIDE 0.5 MILLIGRAM(S): 2 INJECTION INTRAMUSCULAR; INTRAVENOUS; SUBCUTANEOUS at 21:22

## 2020-01-01 RX ADMIN — DEXMEDETOMIDINE HYDROCHLORIDE IN 0.9% SODIUM CHLORIDE 2.67 MICROGRAM(S)/KG/HR: 4 INJECTION INTRAVENOUS at 19:49

## 2020-01-01 RX ADMIN — CHLORHEXIDINE GLUCONATE 15 MILLILITER(S): 213 SOLUTION TOPICAL at 17:11

## 2020-01-01 RX ADMIN — Medication 40 MILLIGRAM(S): at 10:08

## 2020-01-01 RX ADMIN — Medication 40 MILLIGRAM(S): at 23:56

## 2020-01-01 RX ADMIN — Medication 4: at 14:53

## 2020-01-01 RX ADMIN — Medication 40 MILLIGRAM(S): at 20:10

## 2020-01-01 RX ADMIN — Medication 4: at 18:19

## 2020-01-01 RX ADMIN — Medication 6: at 05:13

## 2020-01-01 RX ADMIN — Medication 100 MILLIEQUIVALENT(S): at 15:17

## 2020-01-01 RX ADMIN — HEPARIN SODIUM 14 UNIT(S)/HR: 5000 INJECTION INTRAVENOUS; SUBCUTANEOUS at 19:45

## 2020-01-01 RX ADMIN — HEPARIN SODIUM 5000 UNIT(S): 5000 INJECTION INTRAVENOUS; SUBCUTANEOUS at 14:37

## 2020-01-01 RX ADMIN — HALOPERIDOL DECANOATE 1 MILLIGRAM(S): 100 INJECTION INTRAMUSCULAR at 12:23

## 2020-01-01 RX ADMIN — HYDROMORPHONE HYDROCHLORIDE 0.5 MILLIGRAM(S): 2 INJECTION INTRAMUSCULAR; INTRAVENOUS; SUBCUTANEOUS at 11:35

## 2020-01-01 RX ADMIN — Medication 8: at 05:23

## 2020-01-01 RX ADMIN — HUMAN INSULIN 10 UNIT(S): 100 INJECTION, SUSPENSION SUBCUTANEOUS at 05:13

## 2020-01-01 RX ADMIN — Medication 50 MILLIEQUIVALENT(S): at 13:33

## 2020-01-01 RX ADMIN — PIPERACILLIN AND TAZOBACTAM 200 GRAM(S): 4; .5 INJECTION, POWDER, LYOPHILIZED, FOR SOLUTION INTRAVENOUS at 18:39

## 2020-01-01 RX ADMIN — Medication 20 MILLIGRAM(S): at 17:05

## 2020-01-01 RX ADMIN — VASOPRESSIN 1.8 UNIT(S)/MIN: 20 INJECTION INTRAVENOUS at 11:17

## 2020-01-01 RX ADMIN — HUMAN INSULIN 8 UNIT(S): 100 INJECTION, SUSPENSION SUBCUTANEOUS at 19:47

## 2020-01-01 RX ADMIN — PIPERACILLIN AND TAZOBACTAM 25 GRAM(S): 4; .5 INJECTION, POWDER, LYOPHILIZED, FOR SOLUTION INTRAVENOUS at 21:26

## 2020-01-01 RX ADMIN — Medication 2: at 01:45

## 2020-01-01 RX ADMIN — SENNA PLUS 2 TABLET(S): 8.6 TABLET ORAL at 21:55

## 2020-01-01 RX ADMIN — HEPARIN SODIUM 5000 UNIT(S): 5000 INJECTION INTRAVENOUS; SUBCUTANEOUS at 14:04

## 2020-01-01 RX ADMIN — CHLORHEXIDINE GLUCONATE 15 MILLILITER(S): 213 SOLUTION TOPICAL at 05:34

## 2020-01-01 RX ADMIN — SODIUM CHLORIDE 500 MILLILITER(S): 9 INJECTION INTRAMUSCULAR; INTRAVENOUS; SUBCUTANEOUS at 20:49

## 2020-01-01 RX ADMIN — CARVEDILOL PHOSPHATE 6.25 MILLIGRAM(S): 80 CAPSULE, EXTENDED RELEASE ORAL at 17:04

## 2020-01-01 RX ADMIN — PIPERACILLIN AND TAZOBACTAM 25 GRAM(S): 4; .5 INJECTION, POWDER, LYOPHILIZED, FOR SOLUTION INTRAVENOUS at 12:26

## 2020-01-01 RX ADMIN — PIPERACILLIN AND TAZOBACTAM 25 GRAM(S): 4; .5 INJECTION, POWDER, LYOPHILIZED, FOR SOLUTION INTRAVENOUS at 05:47

## 2020-01-01 RX ADMIN — PANTOPRAZOLE SODIUM 40 MILLIGRAM(S): 20 TABLET, DELAYED RELEASE ORAL at 12:46

## 2020-01-01 RX ADMIN — Medication 2: at 17:11

## 2020-01-01 RX ADMIN — Medication 400 MILLIGRAM(S): at 09:25

## 2020-01-01 RX ADMIN — HUMAN INSULIN 4 UNIT(S): 100 INJECTION, SUSPENSION SUBCUTANEOUS at 17:11

## 2020-01-01 RX ADMIN — HUMAN INSULIN 12 UNIT(S): 100 INJECTION, SUSPENSION SUBCUTANEOUS at 17:29

## 2020-01-01 RX ADMIN — Medication 40 MILLIEQUIVALENT(S): at 16:29

## 2020-01-01 RX ADMIN — Medication 100 MILLIEQUIVALENT(S): at 20:17

## 2020-01-01 RX ADMIN — Medication 9.99 MICROGRAM(S)/KG/MIN: at 05:34

## 2020-01-01 RX ADMIN — HYDROMORPHONE HYDROCHLORIDE 0.5 MILLIGRAM(S): 2 INJECTION INTRAMUSCULAR; INTRAVENOUS; SUBCUTANEOUS at 16:49

## 2020-01-01 RX ADMIN — Medication 4: at 10:18

## 2020-01-01 RX ADMIN — HYDROMORPHONE HYDROCHLORIDE 0.5 MILLIGRAM(S): 2 INJECTION INTRAMUSCULAR; INTRAVENOUS; SUBCUTANEOUS at 22:11

## 2020-01-01 RX ADMIN — PIPERACILLIN AND TAZOBACTAM 25 GRAM(S): 4; .5 INJECTION, POWDER, LYOPHILIZED, FOR SOLUTION INTRAVENOUS at 05:33

## 2020-01-01 RX ADMIN — PANTOPRAZOLE SODIUM 40 MILLIGRAM(S): 20 TABLET, DELAYED RELEASE ORAL at 14:04

## 2020-01-01 RX ADMIN — PANTOPRAZOLE SODIUM 40 MILLIGRAM(S): 20 TABLET, DELAYED RELEASE ORAL at 13:47

## 2020-01-01 RX ADMIN — SODIUM CHLORIDE 500 MILLILITER(S): 9 INJECTION INTRAMUSCULAR; INTRAVENOUS; SUBCUTANEOUS at 17:34

## 2020-01-01 RX ADMIN — HUMAN INSULIN 12 UNIT(S): 100 INJECTION, SUSPENSION SUBCUTANEOUS at 01:40

## 2020-01-01 RX ADMIN — VASOPRESSIN 1.8 UNIT(S)/MIN: 20 INJECTION INTRAVENOUS at 05:34

## 2020-01-01 RX ADMIN — Medication 4: at 06:34

## 2020-01-01 RX ADMIN — Medication 1: at 12:25

## 2020-01-01 RX ADMIN — Medication 9.99 MICROGRAM(S)/KG/MIN: at 03:16

## 2020-01-01 RX ADMIN — CHLORHEXIDINE GLUCONATE 15 MILLILITER(S): 213 SOLUTION TOPICAL at 18:20

## 2020-01-01 RX ADMIN — Medication 20 MILLIGRAM(S): at 03:17

## 2020-01-01 RX ADMIN — Medication 40 MILLIGRAM(S): at 11:07

## 2020-01-01 RX ADMIN — DEXMEDETOMIDINE HYDROCHLORIDE IN 0.9% SODIUM CHLORIDE 2.67 MICROGRAM(S)/KG/HR: 4 INJECTION INTRAVENOUS at 05:34

## 2020-01-01 RX ADMIN — Medication 20 MILLIEQUIVALENT(S): at 21:16

## 2020-01-01 RX ADMIN — DEXMEDETOMIDINE HYDROCHLORIDE IN 0.9% SODIUM CHLORIDE 2.67 MICROGRAM(S)/KG/HR: 4 INJECTION INTRAVENOUS at 11:17

## 2020-01-01 RX ADMIN — HEPARIN SODIUM 5000 UNIT(S): 5000 INJECTION INTRAVENOUS; SUBCUTANEOUS at 14:58

## 2020-01-01 RX ADMIN — Medication 1: at 12:07

## 2020-01-01 RX ADMIN — HYDROMORPHONE HYDROCHLORIDE 0.5 MILLIGRAM(S): 2 INJECTION INTRAMUSCULAR; INTRAVENOUS; SUBCUTANEOUS at 05:30

## 2020-01-01 RX ADMIN — HYDROMORPHONE HYDROCHLORIDE 0.5 MILLIGRAM(S): 2 INJECTION INTRAMUSCULAR; INTRAVENOUS; SUBCUTANEOUS at 02:18

## 2020-01-01 RX ADMIN — PIPERACILLIN AND TAZOBACTAM 25 GRAM(S): 4; .5 INJECTION, POWDER, LYOPHILIZED, FOR SOLUTION INTRAVENOUS at 13:47

## 2020-01-01 RX ADMIN — Medication 6: at 22:26

## 2020-01-01 RX ADMIN — Medication 1: at 17:14

## 2020-01-01 RX ADMIN — INSULIN GLARGINE 15 UNIT(S): 100 INJECTION, SOLUTION SUBCUTANEOUS at 21:33

## 2020-01-01 RX ADMIN — SODIUM CHLORIDE 75 MILLILITER(S): 9 INJECTION INTRAMUSCULAR; INTRAVENOUS; SUBCUTANEOUS at 12:46

## 2020-01-01 RX ADMIN — PIPERACILLIN AND TAZOBACTAM 200 GRAM(S): 4; .5 INJECTION, POWDER, LYOPHILIZED, FOR SOLUTION INTRAVENOUS at 18:29

## 2020-01-01 RX ADMIN — HUMAN INSULIN 10 UNIT(S): 100 INJECTION, SUSPENSION SUBCUTANEOUS at 11:35

## 2020-01-01 RX ADMIN — Medication 6: at 11:16

## 2020-01-01 RX ADMIN — CHLORHEXIDINE GLUCONATE 15 MILLILITER(S): 213 SOLUTION TOPICAL at 05:03

## 2020-01-01 RX ADMIN — Medication 1: at 08:10

## 2020-01-01 RX ADMIN — Medication 4: at 22:06

## 2020-01-01 RX ADMIN — Medication 40 MILLIEQUIVALENT(S): at 16:16

## 2020-01-01 RX ADMIN — Medication 1 ENEMA: at 12:09

## 2020-01-01 RX ADMIN — Medication 4: at 21:37

## 2020-01-01 RX ADMIN — Medication 4: at 01:51

## 2020-01-01 RX ADMIN — HEPARIN SODIUM 15 UNIT(S)/HR: 5000 INJECTION INTRAVENOUS; SUBCUTANEOUS at 13:36

## 2020-01-01 RX ADMIN — ENOXAPARIN SODIUM 40 MILLIGRAM(S): 100 INJECTION SUBCUTANEOUS at 12:11

## 2020-01-01 RX ADMIN — Medication 40 MILLIEQUIVALENT(S): at 21:51

## 2020-01-01 RX ADMIN — ENOXAPARIN SODIUM 40 MILLIGRAM(S): 100 INJECTION SUBCUTANEOUS at 12:22

## 2020-01-01 RX ADMIN — HUMAN INSULIN 14 UNIT(S): 100 INJECTION, SUSPENSION SUBCUTANEOUS at 06:10

## 2020-01-01 RX ADMIN — PIPERACILLIN AND TAZOBACTAM 25 GRAM(S): 4; .5 INJECTION, POWDER, LYOPHILIZED, FOR SOLUTION INTRAVENOUS at 14:58

## 2020-01-01 RX ADMIN — HYDROMORPHONE HYDROCHLORIDE 0.5 MILLIGRAM(S): 2 INJECTION INTRAMUSCULAR; INTRAVENOUS; SUBCUTANEOUS at 06:53

## 2020-01-01 RX ADMIN — Medication 20 MILLIGRAM(S): at 12:24

## 2020-01-01 RX ADMIN — CHLORHEXIDINE GLUCONATE 15 MILLILITER(S): 213 SOLUTION TOPICAL at 17:24

## 2020-01-01 RX ADMIN — CHLORHEXIDINE GLUCONATE 15 MILLILITER(S): 213 SOLUTION TOPICAL at 05:12

## 2020-01-01 RX ADMIN — HEPARIN SODIUM 14 UNIT(S)/HR: 5000 INJECTION INTRAVENOUS; SUBCUTANEOUS at 03:15

## 2020-01-01 RX ADMIN — HYDROMORPHONE HYDROCHLORIDE 0.5 MILLIGRAM(S): 2 INJECTION INTRAMUSCULAR; INTRAVENOUS; SUBCUTANEOUS at 16:54

## 2020-01-01 RX ADMIN — POTASSIUM PHOSPHATE, MONOBASIC POTASSIUM PHOSPHATE, DIBASIC 83.33 MILLIMOLE(S): 236; 224 INJECTION, SOLUTION INTRAVENOUS at 20:17

## 2020-01-01 RX ADMIN — CHLORHEXIDINE GLUCONATE 15 MILLILITER(S): 213 SOLUTION TOPICAL at 05:47

## 2020-01-01 RX ADMIN — HEPARIN SODIUM 1700 UNIT(S)/HR: 5000 INJECTION INTRAVENOUS; SUBCUTANEOUS at 04:53

## 2020-01-01 RX ADMIN — Medication 4: at 06:18

## 2020-01-01 RX ADMIN — Medication 2: at 13:26

## 2020-01-01 RX ADMIN — HUMAN INSULIN 14 UNIT(S): 100 INJECTION, SUSPENSION SUBCUTANEOUS at 11:16

## 2020-01-01 RX ADMIN — PIPERACILLIN AND TAZOBACTAM 25 GRAM(S): 4; .5 INJECTION, POWDER, LYOPHILIZED, FOR SOLUTION INTRAVENOUS at 05:05

## 2020-01-01 RX ADMIN — Medication 2: at 17:29

## 2020-01-01 RX ADMIN — PANTOPRAZOLE SODIUM 40 MILLIGRAM(S): 20 TABLET, DELAYED RELEASE ORAL at 12:06

## 2020-01-01 RX ADMIN — ERYTHROPOIETIN 10000 UNIT(S): 10000 INJECTION, SOLUTION INTRAVENOUS; SUBCUTANEOUS at 18:08

## 2020-01-01 RX ADMIN — Medication 8: at 09:09

## 2020-01-01 RX ADMIN — PIPERACILLIN AND TAZOBACTAM 25 GRAM(S): 4; .5 INJECTION, POWDER, LYOPHILIZED, FOR SOLUTION INTRAVENOUS at 21:37

## 2020-01-01 RX ADMIN — HUMAN INSULIN 8 UNIT(S): 100 INJECTION, SUSPENSION SUBCUTANEOUS at 02:14

## 2020-01-01 RX ADMIN — PIPERACILLIN AND TAZOBACTAM 25 GRAM(S): 4; .5 INJECTION, POWDER, LYOPHILIZED, FOR SOLUTION INTRAVENOUS at 14:10

## 2020-01-01 RX ADMIN — DEXMEDETOMIDINE HYDROCHLORIDE IN 0.9% SODIUM CHLORIDE 2.67 MICROGRAM(S)/KG/HR: 4 INJECTION INTRAVENOUS at 13:25

## 2020-01-01 RX ADMIN — Medication 25 MILLILITER(S): at 06:14

## 2020-01-01 RX ADMIN — Medication 8: at 02:12

## 2020-01-01 RX ADMIN — HEPARIN SODIUM 14 UNIT(S)/HR: 5000 INJECTION INTRAVENOUS; SUBCUTANEOUS at 19:49

## 2020-01-01 RX ADMIN — Medication 6: at 06:11

## 2020-01-01 RX ADMIN — CHLORHEXIDINE GLUCONATE 15 MILLILITER(S): 213 SOLUTION TOPICAL at 16:59

## 2020-01-01 RX ADMIN — VASOPRESSIN 1.8 UNIT(S)/MIN: 20 INJECTION INTRAVENOUS at 19:49

## 2020-01-01 RX ADMIN — Medication 40 MILLIEQUIVALENT(S): at 05:34

## 2020-01-01 RX ADMIN — FLUCONAZOLE 100 MILLIGRAM(S): 150 TABLET ORAL at 11:07

## 2020-01-01 RX ADMIN — HYDROMORPHONE HYDROCHLORIDE 0.5 MILLIGRAM(S): 2 INJECTION INTRAMUSCULAR; INTRAVENOUS; SUBCUTANEOUS at 08:23

## 2020-01-01 RX ADMIN — POLYETHYLENE GLYCOL 3350 17 GRAM(S): 17 POWDER, FOR SOLUTION ORAL at 17:14

## 2020-05-08 PROBLEM — I10 ESSENTIAL (PRIMARY) HYPERTENSION: Chronic | Status: ACTIVE | Noted: 2017-07-09

## 2020-05-08 NOTE — H&P ADULT - PROBLEM SELECTOR PLAN 5
Baseline SCr 1.2-1.4. Has not been urinating the past few days. BUN/Cr ~25. Could be combination of obstructive and pre-renal   - Will trend SCr and check in AM  - Fuller in place, monitor UOP  - Monitor electrolytes

## 2020-05-08 NOTE — H&P ADULT - PROBLEM SELECTOR PLAN 7
In the past on Metformin and Glipizide.   - Will place on low ISS AC/QHS  - Goal fingersticks 120 - 180  - Will check A1C in AM

## 2020-05-08 NOTE — CONSULT NOTE ADULT - SUBJECTIVE AND OBJECTIVE BOX
p ([id number])     HPI: 74M PMH CAD s/p CABG, prostate ca s/p chemo presents with weakness. Pt states yesterday he attempted to stand up from couch and felt weak in his legs so he fell to the ground. Was on the floor about 1hr until his son came home and helped him up. States he feels like his R leg is weak. Usually walks at home with a walker but has not been able to since yesterday. Pt denies saddle anesthesia, denies bowel/bladder incont. States does have urinary retention, has not urinated for a few days.      Imaging:    Exam: AAOx3, FC, UE 5/5, BLE diffusely 2/5. SILT. No-hyperreflexia. Decreased rectal tone. Retaining, now has mccann.    --Anticoagulation:    =====================  PAST MEDICAL HISTORY   HTN (hypertension)  Prostate cancer  Hyperlipidemia  Neuropathy  Diabetes mellitus, type 2  CAD (coronary artery disease)    PAST SURGICAL HISTORY   History of cholecystectomy  H/O coronary artery bypass surgery  FH: coronary artery bypass surgery        MEDICATIONS:  Antibiotics:    Neuro:    Other:      SOCIAL HISTORY:   Occupation:   Marital Status:     FAMILY HISTORY:  No pertinent family history in first degree relatives      ROS: Negative except per HPI    LABS:  PT/INR - ( 08 May 2020 12:39 )   PT: 13.4 sec;   INR: 1.16 ratio         PTT - ( 08 May 2020 12:39 )  PTT:30.5 sec                        10.6   18.89 )-----------( 130      ( 08 May 2020 12:39 )             32.9     05-08    143  |  104  |  51<H>  ----------------------------<  81  3.2<L>   |  21<L>  |  2.19<H>    Ca    9.4      08 May 2020 12:39    TPro  6.5  /  Alb  3.4  /  TBili  0.6  /  DBili  x   /  AST  42<H>  /  ALT  11  /  AlkPhos  529<H>  05-08

## 2020-05-08 NOTE — H&P ADULT - NSICDXPASTMEDICALHX_GEN_ALL_CORE_FT
PAST MEDICAL HISTORY:  CAD (coronary artery disease)     Diabetes mellitus, type 2     HTN (hypertension)     Hyperlipidemia     Neuropathy     Prostate cancer

## 2020-05-08 NOTE — ED ADULT NURSE REASSESSMENT NOTE - NS ED NURSE REASSESS COMMENT FT1
[facility]  No pending orders at this time.  Discussing possible core compression.  Will continue to monitor as ordered.

## 2020-05-08 NOTE — ED ADULT NURSE REASSESSMENT NOTE - NS ED NURSE REASSESS COMMENT FT1
bladder scanned showed 536 ml of urine, coude mccann 14g placed via aseptic technique, draining yellow cloudy urine at this time.

## 2020-05-08 NOTE — H&P ADULT - ASSESSMENT
74 year old male with a PMH of CAD s/p CABG, HTN, HLD, Prostate Cancer presenting with weakness. 74 year old male with a PMH of CAD s/p CABG, T2DM, HTN, HLD, Prostate Cancer on chemo presenting with bilateral lower extremity weakness concerning for cord compression, found to have metastatic disease.

## 2020-05-08 NOTE — H&P ADULT - ATTENDING COMMENTS
This patient is a 74yoM with PMH of CAD s/p CABG, T2DM, Htn, hLd, prostate CA on oral chemotherapy who presents to the ED with complaint of weakness in bilateral lower extremities (L weaker than R). The patient endorses that he fell while trying to get up from a chair because his legs gave out, and he was on the floor for about an hour before he was helped up. He sustained a LUE skin tear and abdominal dimscomfort from the fall. He states he previously was able to pull himself up to use a walker. His upper extremity strength and sensation are intact. He denies pain in the lower extremities or incontinence to stool or urine. Disha endorses having constipation for several months, which he attributes to his chemotherapy regimen. Pt had urinary retention for 1 day. On exam, pt is A&O x 203, lungs CTAB, cardiac exam NS1S2 with systolic murmur, abd soft and nontender, back with midline lumbar tenderness to palpation. Pt has full sensation to light touch, symmetric smile and eyebrow raise, 5/5 BUE strength, 3/5 LLE strength, 4/5 RLE strength.   MRI imaging was limited but per report, did evaluate length of spinal cord and did not identify cord compression. Sclerotic, lytic and blastic lesions seen c/w metastatic disease. Pt also noted to have signs of possible ankylosing spondylitis on exam. A This patient is a 74yoM with PMH of CAD s/p CABG, T2DM, Htn, hLd, prostate CA on oral chemotherapy who presents to the ED with complaint of weakness in bilateral lower extremities (L weaker than R). The patient endorses that he fell while trying to get up from a chair because his legs gave out, and he was on the floor for about an hour before he was helped up. He sustained a LUE skin tear and abdominal dimscomfort from the fall. He states he previously was able to pull himself up to use a walker. His upper extremity strength and sensation are intact. He denies pain in the lower extremities or incontinence to stool or urine. Xiomynet endorses having constipation for several months, which he attributes to his chemotherapy regimen. Pt had urinary retention for 1 day. On exam, pt is A&O x 203, lungs CTAB, cardiac exam NS1S2 with systolic murmur, abd soft and nontender, back with midline lumbar tenderness to palpation. Pt has full sensation to light touch, symmetric smile and eyebrow raise, 5/5 BUE strength, 3/5 LLE strength, 4/5 RLE strength.   MRI imaging was limited but per report, did evaluate length of spinal cord and did not identify cord compression. Sclerotic, lytic and blastic lesions seen c/w metastatic disease. Pt also noted to have signs of possible ankylosing spondylitis on exam.   Agree with plan noted above. MRI imaging did not identify cord compression along spinal cord; differential includes but not limited to progression of disease of prostate CA causing generalized weakness vs cauda equina. Patient will require further imaging of lumbar spine and pelvis, and likely will need pre-medication due to claustrophobia (pt notes MRI felt like he was in war- the patient is a ).   Will also need MRI pelvis to assess 3.7cm L gluteal lesion noted on CT imaging.   Continue zosyn for 3.7cm L gluteal lesion in case this is due to abscess, especially given leukocytosis, follow up cultures.   Confirm medications in AM. Discuss with wife and oncologist patient's previous course of treatment- I explained to the patient that metastatic lesions were found on imaging. This patient is a 74yoM with PMH of CAD s/p CABG, T2DM, Htn, hLd, prostate CA on oral chemotherapy who presents to the ED with complaint of weakness in bilateral lower extremities (L weaker than R). The patient endorses that he fell while trying to get up from a chair because his legs gave out, and he was on the floor for about an hour before he was helped up. He sustained a LUE skin tear and abdominal discomfort from the fall. He states he previously was able to pull himself up to use a walker. His upper extremity strength and sensation are intact. He denies pain in the lower extremities or incontinence to stool or urine. Patient endorses having constipation for several months, which he attributes to his chemotherapy regimen. Pt had urinary retention for 1 day. On exam, pt is A&O x 203, lungs CTAB, cardiac exam NS1S2 with systolic murmur, abd soft and nontender, back with midline lumbar tenderness to palpation. Pt has full sensation to light touch, symmetric smile and eyebrow raise, 5/5 BUE strength, 3/5 LLE strength, 4/5 RLE strength.   MRI imaging was limited but per report, did evaluate length of spinal cord and did not identify cord compression. Sclerotic, lytic and blastic lesions seen c/w metastatic disease. Pt also noted to have signs of possible ankylosing spondylitis on exam.   Agree with plan noted above. MRI imaging did not identify cord compression along spinal cord; differential includes but not limited to progression of disease of prostate CA causing generalized weakness vs cauda equina. Patient will require further MRI imaging of lumbar spine and pelvis, and likely will need pre-medication due to claustrophobia (pt notes MRI felt like he was in war- the patient is a ).   Will also need MRI pelvis to assess 3.7cm L gluteal lesion noted on CT imaging.   Continue zosyn for 3.7cm L gluteal lesion in case this is due to abscess, especially given leukocytosis, follow up cultures.   Confirm medications in AM. Discuss with wife and oncologist patient's previous course of treatment- I explained to the patient that metastatic lesions were found on imaging. This patient is a 74yoM with PMH of CAD s/p CABG, T2DM, Htn, hLd, prostate CA on oral chemotherapy who presents to the ED with complaint of weakness in bilateral lower extremities (L weaker than R). The patient endorses that he fell while trying to get up from a chair because his legs gave out, and he was on the floor for about an hour before he was helped up. He sustained a LUE skin tear and abdominal discomfort from the fall. He states he previously was able to pull himself up to use a walker. His upper extremity strength and sensation are intact. He denies pain in the lower extremities or incontinence to stool or urine. Patient endorses having constipation for several months, which he attributes to his chemotherapy regimen. Pt had urinary retention for 1 day. On exam, pt is A&O x 203, lungs CTAB, cardiac exam NS1S2 with systolic murmur, abd soft and nontender, back with midline lumbar tenderness to palpation. Pt has full sensation to light touch, symmetric smile and eyebrow raise, 5/5 BUE strength, 3/5 LLE strength, 4/5 RLE strength.   MRI imaging was limited but per report, did evaluate length of spinal cord and did not identify cord compression. Sclerotic, lytic and blastic lesions seen c/w metastatic disease. Pt also noted to have signs of possible ankylosing spondylitis on exam.   Agree with plan noted above. MRI imaging did not identify cord compression along spinal cord; differential includes but not limited to progression of disease of prostate CA causing generalized weakness vs cauda equina. Patient will require further MRI imaging of lumbar spine and pelvis, and likely will need pre-medication due to claustrophobia (pt notes MRI felt like he was in war- the patient is a ).   Will also need MRI pelvis to assess 3.7cm L gluteal lesion noted on CT imaging.   Continue zosyn for 3.7cm L gluteal lesion in case this is due to abscess, especially given leukocytosis, follow up cultures.   Confirm medications in AM. Discuss with wife and oncologist patient's previous course of treatment- I explained to the patient that metastatic lesions were found on imaging.  Pt was noted by RN to have sacral wound- RN placed wound care consult

## 2020-05-08 NOTE — ED ADULT NURSE NOTE - OBJECTIVE STATEMENT
75 y/o male aox4 brought in by EMS from home with PMH of Prostate Ca. presents to the ED c/o progressively worsening weakness x3 days with difficulty walking. Recent fall yesterday from bed, reports "falling on abdomen and worsening right knee pain 8/10 from dragging it on the floor". +baseline abdominal distention. No lower leg edema, no obvious bruising. +ecchymosis on left arm due to a reaction from chemo drug. Vitals stable. 20g IV left hand in place, +blood return, flushes well. Denies any sick contacts, chest pain, shortness of breath, fever, nausea, vomiting, dizziness, diarrhea. Last BM 3-4 days ago. 73 y/o male aox4 brought in by EMS from home with PMH of DM2, HTN, HLD, CAD, Prostate Ca., Coronary bypass presents to the ED c/o progressively worsening weakness x3 days with difficulty walking. Recent fall yesterday from bed on floor x1hr, reports "falling on abdomen and worsening right knee pain 8/10 from dragging it on the floor". +baseline abdominal distention. No lower leg edema, no obvious bruising. +ecchymosis on left arm due to a reaction from chemo drug. Vitals stable. 20g IV left hand in place, +blood return, flushes well. Denies any sick contacts, chest pain, shortness of breath, fever, nausea, vomiting, dizziness, diarrhea. Last BM 3-4 days ago.

## 2020-05-08 NOTE — ED ADULT NURSE NOTE - NSIMPLEMENTINTERV_GEN_ALL_ED
Implemented All Fall Risk Interventions:  [place] to call system. Call bell, personal items and telephone within reach. Instruct patient to call for assistance. Room bathroom lighting operational. Non-slip footwear when patient is off stretcher. Physically safe environment: no spills, clutter or unnecessary equipment. Stretcher in lowest position, wheels locked, appropriate side rails in place. Provide visual cue, wrist band, yellow gown, etc. Monitor gait and stability. Monitor for mental status changes and reorient to person, place, and time. Review medications for side effects contributing to fall risk. Reinforce activity limits and safety measures with patient and family.

## 2020-05-08 NOTE — ED PROVIDER NOTE - PROGRESS NOTE DETAILS
MRI ordered, discussed with neurorads, NSGY at bedside and following.   Arsenio Quintanilla M.D. PGY-2 Discussed MRI with neurorads. They are able to see the full spinal cord and have essentially ruled out cord compression. There was concern for a possible pelvic hematoma so CT bony pelvis was obtained, which shows some new mets. Pt received xanax Discussed MRI with neurorads. They are able to see the full spinal cord and have essentially ruled out cord compression. There was concern for a possible pelvic hematoma so CT bony pelvis was obtained, which shows some new mets. Pt received xanax for MRI and when back in ER was noted to have somewhat soft BP. Mentating well, however after reviewing results ot has elevated wbc and CXR which shows possibly PNA. ABX given. Will admit to medicine.  Arsenio Quintanilla M.D. PGY-2 Did not give full fluid bolus due to cardiac history

## 2020-05-08 NOTE — ED PROVIDER NOTE - CLINICAL SUMMARY MEDICAL DECISION MAKING FREE TEXT BOX
74M PMH prostate Ca, presents with neurological deficit, urinary retention, decreased rectal tone. Concen is for cord compression MRI, NSGY. TBA. 74M PMH prostate Ca, presents with neurological deficit, urinary retention, decreased rectal tone. Concen is for cord compression MRI, NSGY. TBA.    Attending MD Lemus: 75 yo male with  PMH CAD s/p CABG, prostate ca s/p chemo presents with weakness. Pt states yesterday he attempted to stand up from couch and felt weak in his legs so he fell to the ground.  Unable to walk now. At baseline he walks with walker.  On exam upper extremities 5/5, LE 2/5 bilaterally, decreased rectal tone. Plan: rule out cord compression.  STAT spine consult and MRI.

## 2020-05-08 NOTE — ED PROVIDER NOTE - NS ED ROS FT
REVIEW OF SYSTEMS:  General:  no fever, no chills  HEENT: no headache, no vision changes  Cardiac: no chest pain, no palpitations  Respiratory: no cough, no shortness of breath  Gastrointestinal: no abdominal pain, no nausea, no vomiting, no diarrhea  Genitourinary: no hematuria, no dysuria, no urinary frequency  Extremities: no extremity swelling, no extremity pain  Neuro: +focal weakness, no numbness/tingling of the extremities, no decreased sensation  Heme: no easy bleeding, no easy bruising  Skin: no jaundice,  no rashes, no lesions  All other ROS as documented in HPI  -Arsenio Quintanilla, PGY-2

## 2020-05-08 NOTE — ED ADULT NURSE REASSESSMENT NOTE - NS ED NURSE REASSESS COMMENT FT1
Pt. was anxious in MRI and RN went to MRI to medicate pt.  Will access pt. upon return to ED.  Resident MD remains with pt.

## 2020-05-08 NOTE — H&P ADULT - NSHPPHYSICALEXAM_GEN_ALL_CORE
Vital Signs Last 24 Hrs  T(C): 37.1 (08 May 2020 19:42), Max: 37.1 (08 May 2020 19:42)  T(F): 98.8 (08 May 2020 19:42), Max: 98.8 (08 May 2020 19:42)  HR: 73 (08 May 2020 19:42) (73 - 84)  BP: 102/57 (08 May 2020 20:50) (91/57 - 112/50)  BP(mean): 67 (08 May 2020 14:37) (67 - 67)  RR: 21 (08 May 2020 19:42) (16 - 27)  SpO2: 98% (08 May 2020 19:42) (94% - 98%)    PHYSICAL EXAM:  GENERAL: NAD, well-groomed, well-developed  HEAD: Atraumatic, Normocephalic  EYES: EOMI, PERRLA, conjunctiva and sclera clear  ENMT: No tonsillar erythema, exudates, or enlargement; Moist mucous membranes  NECK: Supple, No JVD, Normal Thyroid  NERVOUS SYSTEM: Alert & Oriented X3, Good concentration; Motor Strength 5/5 B/L upper and lower extremities  CHEST/LUNG: Clear to auscultation bilaterally; No rales, rhonchi, wheezing, or rubs  HEART: Regular rate and rhythm; S1 and S2; No murmurs, rubs, or gallops  ABDOMEN: Soft, Nontender, Nondistended: Bowel sounds present  EXTREMITIES: 2+ Peripheral Pulses, No clubbing, cyanosis, or edema  LYMPH: No lymphadenopathy noted  SKIN: No rashes or lesions Vital Signs Last 24 Hrs  T(C): 37.1 (08 May 2020 19:42), Max: 37.1 (08 May 2020 19:42)  T(F): 98.8 (08 May 2020 19:42), Max: 98.8 (08 May 2020 19:42)  HR: 73 (08 May 2020 19:42) (73 - 84)  BP: 102/57 (08 May 2020 20:50) (91/57 - 112/50)  BP(mean): 67 (08 May 2020 14:37) (67 - 67)  RR: 21 (08 May 2020 19:42) (16 - 27)  SpO2: 98% (08 May 2020 19:42) (94% - 98%)    PHYSICAL EXAM:  GENERAL: NAD, on 2L NC   HEAD: Atraumatic, Normocephalic  EYES: EOMI, PERRLA, conjunctiva and sclera clear  ENMT: wearing surgical mask   NECK: Supple  NERVOUS SYSTEM: Alert & Oriented X3, Good concentration; Motor Strength 5/5 UE and 3/5 LE bilaterally. Sensation intact bilaterally   CHEST/LUNG: Clear to auscultation bilaterally  HEART: Regular rate and rhythm; S1 and S2; +holosystolic murmur   ABDOMEN: Soft, Nontender, distended: Bowel sounds present  MUSCULOSKELETAL: No spinal or paraspinal tenderness   : Fuller Catheter draining yellow urine, no CVA tenderness   EXTREMITIES: 2+ Peripheral Pulses, No edema  LYMPH: No lymphadenopathy noted  SKIN: No rashes or lesions Vital Signs Last 24 Hrs  T(C): 37.1 (08 May 2020 19:42), Max: 37.1 (08 May 2020 19:42)  T(F): 98.8 (08 May 2020 19:42), Max: 98.8 (08 May 2020 19:42)  HR: 73 (08 May 2020 19:42) (73 - 84)  BP: 102/57 (08 May 2020 20:50) (91/57 - 112/50)  BP(mean): 67 (08 May 2020 14:37) (67 - 67)  RR: 21 (08 May 2020 19:42) (16 - 27)  SpO2: 98% (08 May 2020 19:42) (94% - 98%)    PHYSICAL EXAM:  GENERAL: NAD, on 2L NC   HEAD: Atraumatic, Normocephalic  EYES: EOMI, PERRLA, conjunctiva and sclera clear  ENMT: wearing surgical mask, no hearing loss  NECK: Supple, no cervical lymphadenopathy  NERVOUS SYSTEM: Alert & Oriented X3, Good concentration; Motor Strength 5/5 UE and 3/5 LE bilaterally. Sensation intact bilaterally   CHEST/LUNG: Clear to auscultation bilaterally  HEART: Regular rate and rhythm; S1 and S2; +holosystolic murmur   ABDOMEN: Soft, Nontender, distended: Bowel sounds present  MUSCULOSKELETAL: No spinal or paraspinal tenderness   : Fuller Catheter draining yellow urine, no CVA tenderness   EXTREMITIES: 2+ Peripheral Pulses, No edema  LYMPH: No lymphadenopathy noted  SKIN: No rashes or lesions

## 2020-05-08 NOTE — H&P ADULT - PROBLEM SELECTOR PLAN 8
Had low SBPs in the 100s. Reportedly on antihypertensives at home.  - Will hold off on antihypertensives for now if BP continues to rise can consider PRN hydralazine or labetalol

## 2020-05-08 NOTE — ED PROVIDER NOTE - OBJECTIVE STATEMENT
74M PMH CAD s/p CABG, prostate ca s/p chemo presents with weakness. Pt states yesterday he attempted to stand up from couch and felt weak in his legs so he fell to the ground. Was on the floor about 1hr until his son came home and helped him up. States he feels like his R leg is weak. Usually walks at home with a walker but has not been able to since yesterday. Pt denies saddle anesthesia, denies bowel/bladder incont. States does have urinary retention, has not urinated for a few days.

## 2020-05-08 NOTE — ED ADULT NURSE REASSESSMENT NOTE - NS ED NURSE REASSESS COMMENT FT1
pt resting in bed pt is alert and orinete x3, speaking full clear sentences, respirations non-labored, skin warm dry and intact, redness to coccyx no open wound, mccann cath maintained, strong pulses throuhgout, pt having bilat lower extrem weakness, sensation intact, pt able to move extremities on command but lower extrems are weak, upper extrems are strong. md is aware, rn will continue to monitor.

## 2020-05-08 NOTE — H&P ADULT - NSHPREVIEWOFSYSTEMS_GEN_ALL_CORE
REVIEW OF SYSTEMS:    CONSTITUTIONAL: No weakness, fevers or chills  EYES/ENT: No visual changes;  No vertigo or throat pain   NECK: No pain or stiffness  RESPIRATORY: No cough, wheezing, hemoptysis; No shortness of breath  CARDIOVASCULAR: No chest pain or palpitations  GASTROINTESTINAL: No abdominal or epigastric pain. No nausea, vomiting, or hematemesis; No diarrhea or constipation. No melena or hematochezia.  GENITOURINARY: No dysuria, frequency or hematuria  NEUROLOGICAL: No numbness or weakness  SKIN: No itching, rashes REVIEW OF SYSTEMS:    CONSTITUTIONAL: No  fevers or chills  EYES/ENT: No visual changes;  No vertigo or throat pain   NECK: No pain or stiffness  RESPIRATORY: No cough, wheezing, hemoptysis; No shortness of breath  CARDIOVASCULAR: No chest pain or palpitations  GASTROINTESTINAL: No abdominal or epigastric pain. No nausea, vomiting, or hematemesis; No diarrhea or constipation.  GENITOURINARY: No dysuria, frequency or hematuria  NEUROLOGICAL: +weakness no paresthesias  MUSCULOSKELETAL: no back pain, no joint pain  SKIN: No itching, rashes

## 2020-05-08 NOTE — H&P ADULT - PROBLEM SELECTOR PLAN 1
Presented with weakness in his lower extremities. Has urinary retention and no bowel incontinence. ANDREE by neurosurgery has decreased rectal tone.   - Has had multiple imaging modalities MRI of the T/L that is incomplete due to claustrophobia seems like by imaging no signs of cord compression but recommending MRI of the pelvis  - Spoke to neurosurgery seems no signs of acute cord compression but imaging is not complete, believe it is due to chronic progressive weakness? and metastatic disease not recommending Decadron at this moment or any acute surgical interventions  - Neuro checks Q4H  - PT consult   - Trend CK s/p IVF Presented with weakness in his lower extremities. Has urinary retention and no bowel incontinence. ANDREE by neurosurgery has decreased rectal tone.   - Has had multiple imaging modalities MRI of the T/L that is incomplete due to claustrophobia seems like by imaging no signs of cord compression but recommending MRI of the pelvis  - Spoke to neurosurgery seems no signs of acute cord compression but imaging is not complete, believe it is due to chronic progressive weakness? and metastatic disease not recommending Decadron at this moment or any acute surgical interventions  - weakness may also be due to progression of prostate CA   - Neuro checks Q4H  - PT consult   - Trend CK s/p IVF

## 2020-05-08 NOTE — ED PROVIDER NOTE - PHYSICAL EXAMINATION
General: Well developed, well nourished  HEENT: Normocephalic and atraumatic, Trachea midline.   Cardiac: Normal S1 and S2 w/ irregular rhythm.   Pulmonary: CTA bilaterally. No increased WOB.   Abdominal: Soft, TTP in lower abdomen, large volume of urine on bladder scanner   Neurologic: AAOx3, 5/5 upper extremities. Significantly decreased strength in bilateral LE, most significant in hip flexors. Significantly decreased rectal tone    Musculoskeletal: No limited ROM.  Vascular: Warm and well perfused  Skin: Color appropriate for race.   Psychiatric: Appropriate mood and affect. No apparent risk to self or others.  Arsenio Quintanilla M.D. PGY-2

## 2020-05-08 NOTE — H&P ADULT - HISTORY OF PRESENT ILLNESS
Mr. Hicks is a 74 year old male with a PMH of CAD s/p CABG, HTN, HLD, Prostate Cancer presenting with weakness.    In the ED:  Vitals: 98.6F /69 HR 75 RR 16   Labs: WBC 18.89 Hgb 10.6 BUN 51 SCr 2.19   COVID x 1 neg  Consults: Neurosurgery  Interventions: Vanc 1g x 1, Zosyn x 1, NS Bolus 500cc x 2, KCl 40 mEq x 1 Mr. Hicks is a 74 year old male with a PMH of CAD s/p CABG, T2DM, HTN, HLD, Prostate Cancer on chemo presenting with bilateral lower extremity weakness. Patient reports that a couple days had an episode where he got up from the chair and due to weakness in his thighs fell to the ground forward. Denies LOC or head trauma. No dizziness, LH, chest pain, shortness of breath or palpitations before. Was on the ground for about an hour and had son-in-law help him up as he felt too weak. Denies back pain or numbness/tingling in his legs. Has been having progressive weakness for the past few weeks. Seems like Neurosurgery spoke to wife and has had progressive weakness for more than a year. Has had no fevers/chills, cough, abdominal pain, nausea, vomiting or diarrhea. Has not been urinating the past few days. No bowel incontinence. Denies saddle anesthesia. [facility]     In the ED:  Vitals: 98.6F /69 HR 75 RR 16   Labs: WBC 18.89 Hgb 10.6 BUN 51 SCr 2.19   COVID x 1 neg  Consults: Neurosurgery  Interventions: Vanc 1g x 1, Zosyn x 1, NS Bolus 500cc x 2, KCl 40 mEq x 1 Mr. Hicks is a 74 year old male with a PMH of CAD s/p CABG, T2DM, HTN, HLD, Prostate Cancer on chemo presenting with bilateral lower extremity weakness. Patient reports that a couple days had an episode where he got up from the chair and due to weakness in his thighs fell to the ground forward. Denies LOC or head trauma. No dizziness, LH, chest pain, shortness of breath or palpitations before. Was on the ground for about an hour and had son-in-law help him up as he felt too weak. Denies back pain or numbness/tingling in his legs. Has been having progressive weakness for the past few weeks. Seems like Neurosurgery spoke to wife and has had progressive weakness for more than a year. Has had no fevers/chills, cough, abdominal pain, nausea, vomiting or diarrhea. Has not been urinating the past few days. No bowel incontinence. Denies saddle anesthesia. His prostate cancer is [facility], and takes oral chemotherapy.     In the ED:  Vitals: 98.6F /69 HR 75 RR 16   Labs: WBC 18.89 Hgb 10.6 BUN 51 SCr 2.19   COVID x 1 neg  Consults: Neurosurgery  Interventions: Vanc 1g x 1, Zosyn x 1, NS Bolus 500cc x 2, KCl 40 mEq x 1

## 2020-05-09 NOTE — PHYSICAL THERAPY INITIAL EVALUATION ADULT - TRANSFER TRAINING, PT EVAL
Pt will perform ALL transfers with mod A, w/ RW as needed, in 2 weeks Pt will perform ALL transfers with min-mod A, w/ RW as needed, in 4 weeks

## 2020-05-09 NOTE — PROGRESS NOTE ADULT - PROBLEM SELECTOR PLAN 2
History of prostate cancer diagnosed many years ago on active chemo. Denies RT or surgeries. [facility] Dr[facility] Oneil. Based on imaging has diffuse metastatic lesions to the bone.   - He is unaware that it is metastatic ideally should speak to wife for more collateral information before informing the patient  - Should speak to his Oncologist of his current status  - Might be the explanation for his weakness? History of prostate cancer diagnosed many years ago on active chemo. Denies RT or surgeries. [facility] Dr[facility] Oneil. Based on imaging has diffuse metastatic lesions to the bone.   - He is unaware that it is metastatic, will f/u with wife for more collateral information  - Should speak to his Oncologist of his current status  - Might be the explanation for his weakness?

## 2020-05-09 NOTE — PROGRESS NOTE ADULT - SUBJECTIVE AND OBJECTIVE BOX
PROGRESS NOTE:   Authoted by Dr. Malka Harden MD, Pager [phone] [facility], [id number] [facility]     Patient is a 74y old  Male who presents with a chief complaint of Weakness (09 May 2020 07:51)      SUBJECTIVE / OVERNIGHT EVENTS:    ADDITIONAL REVIEW OF SYSTEMS:    MEDICATIONS  (STANDING):  dextrose 5%. 1000 milliLiter(s) (50 mL/Hr) IV Continuous <Continuous>  dextrose 50% Injectable 12.5 Gram(s) IV Push once  dextrose 50% Injectable 25 Gram(s) IV Push once  dextrose 50% Injectable 25 Gram(s) IV Push once  insulin lispro (HumaLOG) corrective regimen sliding scale   SubCutaneous three times a day before meals  insulin lispro (HumaLOG) corrective regimen sliding scale   SubCutaneous at bedtime  piperacillin/tazobactam IVPB.. 3.375 Gram(s) IV Intermittent every 8 hours  polyethylene glycol 3350 17 Gram(s) Oral daily  senna 2 Tablet(s) Oral at bedtime    MEDICATIONS  (PRN):  dextrose 40% Gel 15 Gram(s) Oral once PRN Blood Glucose LESS THAN 70 milliGRAM(s)/deciliter  glucagon  Injectable 1 milliGRAM(s) IntraMuscular once PRN Glucose LESS THAN 70 milligrams/deciliter      CAPILLARY BLOOD GLUCOSE      POCT Blood Glucose.: 118 mg/dL (09 May 2020 07:53)  POCT Blood Glucose.: 135 mg/dL (08 May 2020 23:32)    I&O's Summary    08 May 2020 07:01  -  09 May 2020 07:00  --------------------------------------------------------  IN: 100 mL / OUT: 2100 mL / NET: -2000 mL        PHYSICAL EXAM:  Vital Signs Last 24 Hrs  T(C): 36.9 (09 May 2020 07:43), Max: 37.3 (09 May 2020 04:14)  T(F): 98.5 (09 May 2020 07:43), Max: 99.2 (09 May 2020 04:14)  HR: 97 (09 May 2020 07:43) (73 - 97)  BP: 145/51 (09 May 2020 07:43) (91/57 - 155/69)  BP(mean): 67 (08 May 2020 14:37) (67 - 67)  RR: 22 (09 May 2020 07:43) (16 - 27)  SpO2: 95% (09 May 2020 07:43) (92% - 100%)    CONSTITUTIONAL: NAD, well-developed  RESPIRATORY: Normal respiratory effort; lungs are clear to auscultation bilaterally  CARDIOVASCULAR: Regular rate and rhythm, normal S1 and S2, no murmur/rub/gallop; No lower extremity edema; Peripheral pulses are 2+ bilaterally  ABDOMEN: Nontender to palpation, normoactive bowel sounds, no rebound/guarding; No hepatosplenomegaly  MUSCLOSKELETAL: no clubbing or cyanosis of digits; no joint swelling or tenderness to palpation  PSYCH: A+O to person, place, and time; affect appropriate    LABS:                        10.2   17.73 )-----------( 120      ( 09 May 2020 06:51 )             32.9     05-09    142  |  105  |  48<H>  ----------------------------<  112<H>  3.6   |  20<L>  |  2.07<H>    Ca    9.0      09 May 2020 06:51  Phos  3.6     05-  Mg     2.0     05-    TPro  6.3  /  Alb  3.0<L>  /  TBili  0.5  /  DBili  x   /  AST  38  /  ALT  11  /  AlkPhos  636<H>  05-09    PT/INR - ( 08 May 2020 12:39 )   PT: 13.4 sec;   INR: 1.16 ratio         PTT - ( 08 May 2020 12:39 )  PTT:30.5 sec  CARDIAC MARKERS ( 09 May 2020 06:51 )  x     / x     / 562 U/L / x     / x      CARDIAC MARKERS ( 08 May 2020 12:39 )  x     / x     / 787 U/L / x     / x          Urinalysis Basic - ( 08 May 2020 12:57 )    Color: Yellow / Appearance: Slightly Turbid / S.016 / pH: x  Gluc: x / Ketone: Negative  / Bili: Negative / Urobili: Negative   Blood: x / Protein: 30 mg/dL / Nitrite: Negative   Leuk Esterase: Negative / RBC: 0 /hpf / WBC 2 /HPF   Sq Epi: x / Non Sq Epi: 0 / Bacteria: Negative          RADIOLOGY & ADDITIONAL TESTS:  Results Reviewed:   Imaging Personally Reviewed:  Electrocardiogram Personally Reviewed:    COORDINATION OF CARE:  Care Discussed with Consultants/Other Providers [Y/N]:  Prior or Outpatient Records Reviewed [Y/N]: PROGRESS NOTE:   Authoted by Dr. Malka Harden MD, Pager [phone] [facility], [id number] [facility]     Patient is a 74y old  Male who presents with a chief complaint of Weakness (09 May 2020 07:51)    SUBJECTIVE / OVERNIGHT EVENTS: No acute events overnight. Patient feels a little better this morning but continues to have lower extremity weakness. Unable to lift legs off of bed however no loss of sensation, and able to wiggle toes. Patient denies any back pain. Last BM about 5 days ago. Hasn't been eating or drinking much. No urinary incontinence.     ADDITIONAL REVIEW OF SYSTEMS:    MEDICATIONS  (STANDING):  dextrose 5%. 1000 milliLiter(s) (50 mL/Hr) IV Continuous <Continuous>  dextrose 50% Injectable 12.5 Gram(s) IV Push once  dextrose 50% Injectable 25 Gram(s) IV Push once  dextrose 50% Injectable 25 Gram(s) IV Push once  insulin lispro (HumaLOG) corrective regimen sliding scale   SubCutaneous three times a day before meals  insulin lispro (HumaLOG) corrective regimen sliding scale   SubCutaneous at bedtime  piperacillin/tazobactam IVPB.. 3.375 Gram(s) IV Intermittent every 8 hours  polyethylene glycol 3350 17 Gram(s) Oral daily  senna 2 Tablet(s) Oral at bedtime    MEDICATIONS  (PRN):  dextrose 40% Gel 15 Gram(s) Oral once PRN Blood Glucose LESS THAN 70 milliGRAM(s)/deciliter  glucagon  Injectable 1 milliGRAM(s) IntraMuscular once PRN Glucose LESS THAN 70 milligrams/deciliter      CAPILLARY BLOOD GLUCOSE      POCT Blood Glucose.: 118 mg/dL (09 May 2020 07:53)  POCT Blood Glucose.: 135 mg/dL (08 May 2020 23:32)    I&O's Summary    08 May 2020 07:01  -  09 May 2020 07:00  --------------------------------------------------------  IN: 100 mL / OUT: 2100 mL / NET: -2000 mL        PHYSICAL EXAM:  Vital Signs Last 24 Hrs  T(C): 36.9 (09 May 2020 07:43), Max: 37.3 (09 May 2020 04:14)  T(F): 98.5 (09 May 2020 07:43), Max: 99.2 (09 May 2020 04:14)  HR: 97 (09 May 2020 07:43) (73 - 97)  BP: 145/51 (09 May 2020 07:43) (91/57 - 155/69)  BP(mean): 67 (08 May 2020 14:37) (67 - 67)  RR: 22 (09 May 2020 07:43) (16 - 27)  SpO2: 95% (09 May 2020 07:43) (92% - 100%)    PHYSICAL EXAM:  General: No acute distress.  HEENT: NCAT. No scleral icterus or injection.   Neck: Supple.  Full ROM.  No JVD.  No thyromegaly. No lymphadenopathy.   Heart: RRR.  Normal S1 and S2.  No murmurs, rubs, or gallops.   Lungs: CTAB. No wheezes, crackles, or rhonchi.    Abdomen: BS+, soft, NT/ND.    Skin: Warm and dry.  No rashes.  Extremities: No edema, clubbing, or cyanosis.    Musculoskeletal: No deformities.    Neuro: A&Ox3. 2/5 strength in LE b/l. Sensation in tact. No saddle anesthesia. Normal UE strength and sensation.     LABS:                        10.2   17.73 )-----------( 120      ( 09 May 2020 06:51 )             32.9     05-09    142  |  105  |  48<H>  ----------------------------<  112<H>  3.6   |  20<L>  |  2.07<H>    Ca    9.0      09 May 2020 06:51  Phos  3.6     05-  Mg     2.0     05-09    TPro  6.3  /  Alb  3.0<L>  /  TBili  0.5  /  DBili  x   /  AST  38  /  ALT  11  /  AlkPhos  636<H>  05-09    PT/INR - ( 08 May 2020 12:39 )   PT: 13.4 sec;   INR: 1.16 ratio         PTT - ( 08 May 2020 12:39 )  PTT:30.5 sec  CARDIAC MARKERS ( 09 May 2020 06:51 )  x     / x     / 562 U/L / x     / x      CARDIAC MARKERS ( 08 May 2020 12:39 )  x     / x     / 787 U/L / x     / x          Urinalysis Basic - ( 08 May 2020 12:57 )    Color: Yellow / Appearance: Slightly Turbid / S.016 / pH: x  Gluc: x / Ketone: Negative  / Bili: Negative / Urobili: Negative   Blood: x / Protein: 30 mg/dL / Nitrite: Negative   Leuk Esterase: Negative / RBC: 0 /hpf / WBC 2 /HPF   Sq Epi: x / Non Sq Epi: 0 / Bacteria: Negative      RADIOLOGY & ADDITIONAL TESTS:  Results Reviewed:   Imaging Personally Reviewed:  Electrocardiogram Personally Reviewed:    COORDINATION OF CARE:  Care Discussed with Consultants/Other Providers [Y/N]:  Prior or Outpatient Records Reviewed [Y/N]:

## 2020-05-09 NOTE — PROGRESS NOTE ADULT - PROBLEM SELECTOR PLAN 3
on CT spine sclerotic and lytic pattern to the right iliac crest with asymmetric right iliac is muscle could represent pelvic hematoma due to adjacent pathologic nondisplaced fracture versus soft tissue neoplasm.  - Spoke to radiology ideally would need a contrast study to evaluate further but given his MARJORIE on CKD unable to do so, had CT bony pelvis which would help identify further  - Has MRI of pelvis pending may potentially provide more information  - Will monitor H/H, CBC in AM if dropping may need gen surgery to comment on CT spine sclerotic and lytic pattern to the right iliac crest with asymmetric right iliac is muscle could represent pelvic hematoma due to adjacent pathologic nondisplaced fracture versus soft tissue neoplasm.  - Spoke to radiology ideally would need a contrast study to evaluate further but given his MARJORIE on CKD unable to do so, had CT bony pelvis which would help identify further  - Has MRI of pelvis pending may potentially provide more information  - Will monitor H/H, CBC

## 2020-05-09 NOTE — PHYSICAL THERAPY INITIAL EVALUATION ADULT - PERTINENT HX OF CURRENT PROBLEM, REHAB EVAL
as per chart review: PMH of CAD s/p CABG, T2DM, HTN, HLD, Prostate Cancer on chemo presenting with bilateral lower extremity weakness concerning for cord compression, found to have metastatic disease. as per chart review: PMH of CAD s/p CABG, T2DM, HTN, HLD, Prostate Cancer on chemo presenting with bilateral lower extremity weakness concerning for cord compression, found to have metastatic disease. Test results below in additional comments. as per chart review: PMH of CAD s/p CABG, T2DM, HTN, HLD, Prostate Cancer on chemo presenting with bilateral lower extremity weakness concerning for cord compression, found to have metastatic disease. Test results below in additional comments.  c/b AHRF likely 2/2 PNA s/p zosyn requiring intubation extubated on 5/19 now transfer to floors,  c/b AHRF likely 2/2 PNA s/p zosyn requiring intubation extubated on 5/19 now transfer to floors. H/H on 5/20: 9.9/33.7

## 2020-05-09 NOTE — PHYSICAL THERAPY INITIAL EVALUATION ADULT - BED MOBILITY TRAINING, PT EVAL
Pt will perform bed mobility w/ mod A in 2 week GOAL: Pt will perform bed mobility independently in 4 weeks

## 2020-05-09 NOTE — PHYSICAL THERAPY INITIAL EVALUATION ADULT - ADDITIONAL COMMENTS
continued from pertinent history of current problem 9/8 CT reveals multiple sclerotic lesions in the bony pelvis concerning for metastatic disease, small sclerotic lesion in the left pedicle at L4 measuring up to 8mm, diffuse osteopenia involving the cervical, thoracic, & lumbar spine making evaluation for acute fractures difficult, sclerotic metastases involving the sacrum & iliac crests, next sclerotic & lytic pattern to the right iliac crest with asymmetric right iliacis muscle could represent pelvic hematoma due to adjacent pathologic nondisplaced fracture versus soft tissue neoplasm. MRI reveals no cord compression continued from pertinent history of current problem 9/8 CT reveals multiple sclerotic lesions in the bony pelvis concerning for metastatic disease, small sclerotic lesion in the left pedicle at L4 measuring up to 8mm, diffuse osteopenia involving the cervical, thoracic, & lumbar spine making evaluation for acute fractures difficult, sclerotic metastases involving the sacrum & iliac crests, next sclerotic & lytic pattern to the right iliac crest with asymmetric right iliacis muscle could represent pelvic hematoma due to adjacent pathologic nondisplaced fracture versus soft tissue neoplasm. MRI reveals no cord compression    Social hx: Pt lives with spouse in 2nd floor apartment, 1 flight of stairs. PTA pt amb via RW and required assist from spouse for ADLS. +shower chair.

## 2020-05-09 NOTE — PROGRESS NOTE ADULT - ATTENDING COMMENTS
Metastatic prostate ca, LE weakness.     MRI as above but inadequate lumbar images. Awaiting MRI L spine and pelvis.     Monitor.

## 2020-05-09 NOTE — PROGRESS NOTE ADULT - ASSESSMENT
Heriberto Hicks  74M PMHx CAD s/p CABG, prostate Ca presents to ED with BLE weakness. Normally ambulates with a walker / supports himself on furniture and railings, but now unable to. Exam: AAOx3, FC, UE 5/5, BLE diffusely 2/5. SILT. Decreased rectal tone. Retaining, now has mccann.  - No acute neurosurgical intervention  - Unable to tolerate full MRI but no evidence of cord compression in C or T spine  - Obtain MRI L-spine with anesthesia when able  - CT imaging shows diffuse bony mets

## 2020-05-09 NOTE — PROGRESS NOTE ADULT - PROBLEM SELECTOR PLAN 5
Baseline SCr 1.2-1.4. Has not been urinating the past few days. BUN/Cr ~25. Could be combination of obstructive and pre-renal   - Will trend SCr and check in AM  - Fuller in place, monitor UOP  - Monitor electrolytes Baseline SCr 1.2-1.4. Has not been urinating the past few days. BUN/Cr ~25. Could be combination of obstructive and pre-renal   - Fuller in place, monitor UOP, trend Cr  - Monitor electrolytes

## 2020-05-09 NOTE — PROGRESS NOTE ADULT - PROBLEM SELECTOR PLAN 1
Presented with weakness in his lower extremities. Has urinary retention and no bowel incontinence. ANDREE by neurosurgery has decreased rectal tone.   - Has had multiple imaging modalities MRI of the T/L that is incomplete due to claustrophobia seems like by imaging no signs of cord compression but recommending MRI of the pelvis  - Spoke to neurosurgery seems no signs of acute cord compression but imaging is not complete, believe it is due to chronic progressive weakness? and metastatic disease not recommending Decadron at this moment or any acute surgical interventions  - Neuro checks Q4H  - PT consult   - Trend CK s/p IVF Presented with weakness in his lower extremities. Has urinary retention and no bowel incontinence. ANDREE by neurosurgery has decreased rectal tone.   - Has had multiple imaging modalities MRI of the T/L that is incomplete due to claustrophobia seems like by imaging no signs of cord compression but recommending MRI of the pelvis  - Per neurosurgery seems no signs of acute cord compression but imaging is not complete, believe it is due to chronic progressive weakness? and metastatic disease not recommending Decadron at this moment or any acute surgical interventions  - Neuro checks Q4H  - PT consult   - Trend CK s/p IVF

## 2020-05-09 NOTE — PROGRESS NOTE ADULT - PROBLEM SELECTOR PLAN 7
In the past on Metformin and Glipizide.   - Will place on low ISS AC/QHS  - Goal fingersticks 120 - 180  - Will check A1C in AM In the past on Metformin and Glipizide.   - Will place on low ISS AC/QHS  - Goal fingersticks 120 - 180  - A1c 8.4

## 2020-05-09 NOTE — PHYSICAL THERAPY INITIAL EVALUATION ADULT - REHAB POTENTIAL, PT EVAL
Pt presents to ED with c/o rectal bleeding that started this morning. Pt denies any abdominal pain, nausea, or vomiting.  
good, to achieve stated therapy goals

## 2020-05-09 NOTE — PHYSICAL THERAPY INITIAL EVALUATION ADULT - BALANCE TRAINING, PT EVAL
Pt will demonstrate improved static/dynamic balance to fair, in order to improve stability, decrease fall risk and increase independence with ADLs within 2 weeks

## 2020-05-09 NOTE — PHYSICAL THERAPY INITIAL EVALUATION ADULT - FOLLOWS COMMANDS/ANSWERS QUESTIONS, REHAB EVAL
75% of the time with multiple verbal/visual/tactile cues.  Poor comprehension, + delay processing information/50% of the time

## 2020-05-09 NOTE — PROGRESS NOTE ADULT - ASSESSMENT
74 year old male with a PMH of CAD s/p CABG, T2DM, HTN, HLD, Prostate Cancer on chemo presenting with bilateral lower extremity weakness concerning for cord compression, found to have metastatic disease.

## 2020-05-09 NOTE — PHYSICAL THERAPY INITIAL EVALUATION ADULT - GAIT TRAINING, PT EVAL
Pt will amb 5 feet w/ Mod assist x2 via RW in 2 weeks Pt will amb 5 feet w/ Mod assist x1 via RW in 4 weeks

## 2020-05-09 NOTE — PROGRESS NOTE ADULT - PROBLEM SELECTOR PLAN 4
Neutrophilic predominant leukocytosis. No fevers here or at home as per patient. No other infectious symptoms. UA appears negative. CXR shows possible RML opacity. Normal lactate. s/p Zosyn and Vancomycin.   - BCx and UCx pending  - Was relatively hypotensive in the ED now SBPs have improved is it because of infection? Is immunocompromised given being on chemo and has metastatic cancer. Will continue Zosyn for now. Check vancomycin level in AM Neutrophilic predominant leukocytosis. No fevers here or at home as per patient. No other infectious symptoms. UA appears negative. CXR shows possible RML opacity. Normal lactate. s/p Zosyn and Vancomycin.   - BCx and UCx pending  - Was relatively hypotensive in the ED now SBPs have improved is it because of infection? Is immunocompromised given being on chemo and has metastatic cancer. Will continue Zosyn for now.

## 2020-05-09 NOTE — PHYSICAL THERAPY INITIAL EVALUATION ADULT - STRENGTHENING, PT EVAL
Increase to 3+/5 throughout to assist with safe bed mobility, transfers, amb and functional activities.

## 2020-05-10 NOTE — PROGRESS NOTE ADULT - PROBLEM SELECTOR PLAN 10
DVT PPx: SCDs, possible hematoma? will avoid chemoprophylaxis  Diet: DASH/TLC, Consistent Carb DVT PPx: SCDs,  Diet: DASH/TLC, Consistent Carb

## 2020-05-10 NOTE — PROGRESS NOTE ADULT - PROBLEM SELECTOR PLAN 3
on CT spine sclerotic and lytic pattern to the right iliac crest with asymmetric right iliac is muscle could represent pelvic hematoma due to adjacent pathologic nondisplaced fracture versus soft tissue neoplasm.  - Spoke to radiology ideally would need a contrast study to evaluate further but given his MARJORIE on CKD unable to do so, had CT bony pelvis which would help identify further  - Has MRI of pelvis pending may potentially provide more information  - Will monitor H/H, CBC on CT spine sclerotic and lytic pattern to the right iliac crest with asymmetric right iliac is muscle could represent pelvic hematoma due to adjacent pathologic nondisplaced fracture versus soft tissue neoplasm.  - Spoke to radiology ideally would need a contrast study to evaluate further but given his MARJORIE on CKD unable to do so, had CT bony pelvis which would help identify further  - plan to repeat MRI of pelvis with anesthesia   - Will monitor H/H, CBC

## 2020-05-10 NOTE — PROGRESS NOTE ADULT - PROBLEM SELECTOR PLAN 4
Neutrophilic predominant leukocytosis. No fevers here or at home as per patient. No other infectious symptoms. UA appears negative. CXR shows possible RML opacity. Normal lactate. s/p Zosyn and Vancomycin.   - BCx and UCx pending  - Was relatively hypotensive in the ED now SBPs have improved is it because of infection? Is immunocompromised given being on chemo and has metastatic cancer. Will continue Zosyn for now. Neutrophilic predominant leukocytosis. No fevers here or at home as per patient. No other infectious symptoms. UA appears negative. CXR shows possible RML opacity. Normal lactate. s/p Zosyn and Vancomycin.   - BCx and UCx NGTD   - Will continue Zosyn for now.

## 2020-05-10 NOTE — PROGRESS NOTE ADULT - PROBLEM SELECTOR PLAN 7
In the past on Metformin and Glipizide.   - Will place on low ISS AC/QHS  - Goal fingersticks 120 - 180  - A1c 8.4

## 2020-05-10 NOTE — PROGRESS NOTE ADULT - PROBLEM SELECTOR PLAN 2
History of prostate cancer diagnosed many years ago on active chemo. Denies RT or surgeries. [facility] Oneil. Based on imaging has diffuse metastatic lesions to the bone.   - He is unaware that it is metastatic, will f/u with wife for more collateral information  - Should speak to his Oncologist of his current status  - Might be the explanation for his weakness? - History of prostate cancer diagnosed many years ago on active chemo. Denies RT or surgeries. [facility] Oneil. Based on imaging has diffuse metastatic lesions to the bone.   - Should speak to his Oncologist of his current status  - Might be the explanation for his weakness?

## 2020-05-10 NOTE — PROGRESS NOTE ADULT - SUBJECTIVE AND OBJECTIVE BOX
Patient is a 74y old  Male who presents with a chief complaint of Weakness (09 May 2020 08:38)      SUBJECTIVE / OVERNIGHT EVENTS: No acute event overnight.     MEDICATIONS  (STANDING):  dextrose 5%. 1000 milliLiter(s) (50 mL/Hr) IV Continuous <Continuous>  dextrose 50% Injectable 12.5 Gram(s) IV Push once  dextrose 50% Injectable 25 Gram(s) IV Push once  dextrose 50% Injectable 25 Gram(s) IV Push once  insulin lispro (HumaLOG) corrective regimen sliding scale   SubCutaneous three times a day before meals  insulin lispro (HumaLOG) corrective regimen sliding scale   SubCutaneous at bedtime  piperacillin/tazobactam IVPB.. 3.375 Gram(s) IV Intermittent every 8 hours  polyethylene glycol 3350 17 Gram(s) Oral two times a day  senna 2 Tablet(s) Oral at bedtime    MEDICATIONS  (PRN):  dextrose 40% Gel 15 Gram(s) Oral once PRN Blood Glucose LESS THAN 70 milliGRAM(s)/deciliter  glucagon  Injectable 1 milliGRAM(s) IntraMuscular once PRN Glucose LESS THAN 70 milligrams/deciliter  haloperidol    Injectable 1 milliGRAM(s) IntraMuscular every 6 hours PRN Agitation      Vital Signs Last 24 Hrs  T(C): 36.9 (09 May 2020 23:50), Max: 37.2 (09 May 2020 15:41)  T(F): 98.4 (09 May 2020 23:50), Max: 99 (09 May 2020 15:41)  HR: 91 (09 May 2020 23:50) (91 - 102)  BP: 154/71 (09 May 2020 23:50) (118/68 - 154/71)  BP(mean): --  RR: 18 (09 May 2020 23:50) (18 - 20)  SpO2: 91% (09 May 2020 23:50) (91% - 96%)  CAPILLARY BLOOD GLUCOSE      POCT Blood Glucose.: 153 mg/dL (10 May 2020 08:01)  POCT Blood Glucose.: 175 mg/dL (09 May 2020 21:13)  POCT Blood Glucose.: 153 mg/dL (09 May 2020 16:37)  POCT Blood Glucose.: 136 mg/dL (09 May 2020 12:08)    I&O's Summary    09 May 2020 07:01  -  10 May 2020 07:00  --------------------------------------------------------  IN: 540 mL / OUT: 1200 mL / NET: -660 mL        PHYSICAL EXAM:  GENERAL: NAD, well-developed  HEAD:  Atraumatic, Normocephalic  EYES: EOMI, PERRLA, conjunctiva and sclera clear  NECK: Supple, No JVD  CHEST/LUNG: Clear to auscultation bilaterally; No wheeze  HEART: Regular rate and rhythm; No murmurs, rubs, or gallops  ABDOMEN: Soft, Nontender, Nondistended; Bowel sounds present  EXTREMITIES:  2+ Peripheral Pulses, No clubbing, cyanosis, or edema  PSYCH: AAOx3  NEUROLOGY: non-focal  SKIN: No rashes or lesions    LABS:                        10.2   17.73 )-----------( 120      ( 09 May 2020 06:51 )             32.9     05-09    142  |  105  |  48<H>  ----------------------------<  112<H>  3.6   |  20<L>  |  2.07<H>    Ca    9.0      09 May 2020 06:51  Phos  3.6     05-  Mg     2.0     -    TPro  6.3  /  Alb  3.0<L>  /  TBili  0.5  /  DBili  x   /  AST  38  /  ALT  11  /  AlkPhos  636<H>  05-09    PT/INR - ( 08 May 2020 12:39 )   PT: 13.4 sec;   INR: 1.16 ratio         PTT - ( 08 May 2020 12:39 )  PTT:30.5 sec  CARDIAC MARKERS ( 09 May 2020 06:51 )  x     / x     / 562 U/L / x     / x      CARDIAC MARKERS ( 08 May 2020 12:39 )  x     / x     / 787 U/L / x     / x          Urinalysis Basic - ( 08 May 2020 12:57 )    Color: Yellow / Appearance: Slightly Turbid / S.016 / pH: x  Gluc: x / Ketone: Negative  / Bili: Negative / Urobili: Negative   Blood: x / Protein: 30 mg/dL / Nitrite: Negative   Leuk Esterase: Negative / RBC: 0 /hpf / WBC 2 /HPF   Sq Epi: x / Non Sq Epi: 0 / Bacteria: Negative        Culture - Blood (collected 08 May 2020 22:14)  Source: .Blood Blood-Venous  Preliminary Report (09 May 2020 23:01):    No growth to date.    Culture - Blood (collected 08 May 2020 22:14)  Source: .Blood Blood-Peripheral  Preliminary Report (09 May 2020 23:01):    No growth to date.    Culture - Urine (collected 08 May 2020 15:09)  Source: .Urine Clean Catch (Midstream)  Final Report (09 May 2020 11:08):    No growth          RADIOLOGY & ADDITIONAL TESTS:    Imaging Personally Reviewed:    Consultant(s) Notes Reviewed:      Care Discussed with Consultants/Other Providers: Patient is a 74y old  Male who presents with a chief complaint of Weakness (09 May 2020 08:38)      SUBJECTIVE / OVERNIGHT EVENTS: Patient given haldol overnight for increased agitation. This AM patient appears agitated attempting to remove mccann and get out of bed. Patient repeatedly cursing this AM, when asked why he was so uncomfortable patient stating that he wants to go pee in the bathroom. Patient also confused this AM stating that he is "100% sure" that he is currently at home. Denies ant chest pain or abdominal pain this AM. he states that he feels like his leg weakness is better.     MEDICATIONS  (STANDING):  dextrose 5%. 1000 milliLiter(s) (50 mL/Hr) IV Continuous <Continuous>  dextrose 50% Injectable 12.5 Gram(s) IV Push once  dextrose 50% Injectable 25 Gram(s) IV Push once  dextrose 50% Injectable 25 Gram(s) IV Push once  insulin lispro (HumaLOG) corrective regimen sliding scale   SubCutaneous three times a day before meals  insulin lispro (HumaLOG) corrective regimen sliding scale   SubCutaneous at bedtime  piperacillin/tazobactam IVPB.. 3.375 Gram(s) IV Intermittent every 8 hours  polyethylene glycol 3350 17 Gram(s) Oral two times a day  senna 2 Tablet(s) Oral at bedtime    MEDICATIONS  (PRN):  dextrose 40% Gel 15 Gram(s) Oral once PRN Blood Glucose LESS THAN 70 milliGRAM(s)/deciliter  glucagon  Injectable 1 milliGRAM(s) IntraMuscular once PRN Glucose LESS THAN 70 milligrams/deciliter  haloperidol    Injectable 1 milliGRAM(s) IntraMuscular every 6 hours PRN Agitation      Vital Signs Last 24 Hrs  T(C): 36.9 (09 May 2020 23:50), Max: 37.2 (09 May 2020 15:41)  T(F): 98.4 (09 May 2020 23:50), Max: 99 (09 May 2020 15:41)  HR: 91 (09 May 2020 23:50) (91 - 102)  BP: 154/71 (09 May 2020 23:50) (118/68 - 154/71)  BP(mean): --  RR: 18 (09 May 2020 23:50) (18 - 20)  SpO2: 91% (09 May 2020 23:50) (91% - 96%)  CAPILLARY BLOOD GLUCOSE      POCT Blood Glucose.: 153 mg/dL (10 May 2020 08:01)  POCT Blood Glucose.: 175 mg/dL (09 May 2020 21:13)  POCT Blood Glucose.: 153 mg/dL (09 May 2020 16:37)  POCT Blood Glucose.: 136 mg/dL (09 May 2020 12:08)    I&O's Summary    09 May 2020 07:01  -  10 May 2020 07:00  --------------------------------------------------------  IN: 540 mL / OUT: 1200 mL / NET: -660 mL        PHYSICAL EXAM:  GENERAL: NAD, agitated, confused   EYES: EOMI, PERRLA, conjunctiva and sclera clear  NECK: Supple, No JVD  CHEST/LUNG: Clear to auscultation bilaterally; No wheeze, rales rhonchi   HEART: Regular rate and rhythm; No murmurs, rubs, or gallops  ABDOMEN: Soft, Nontender, Distended, BS present in all 4 quadrants   EXTREMITIES:  2+ Peripheral Pulses, No ext edema  PSYCH: AAOx3  NEUROLOGY: non-focal, 4/5 strength in UE and LE bilaterally       LABS:                        10.2   17.73 )-----------( 120      ( 09 May 2020 06:51 )             32.9     05-09    142  |  105  |  48<H>  ----------------------------<  112<H>  3.6   |  20<L>  |  2.07<H>    Ca    9.0      09 May 2020 06:51  Phos  3.6     05-09  Mg     2.0     05-09    TPro  6.3  /  Alb  3.0<L>  /  TBili  0.5  /  DBili  x   /  AST  38  /  ALT  11  /  AlkPhos  636<H>  05-09    PT/INR - ( 08 May 2020 12:39 )   PT: 13.4 sec;   INR: 1.16 ratio         PTT - ( 08 May 2020 12:39 )  PTT:30.5 sec  CARDIAC MARKERS ( 09 May 2020 06:51 )  x     / x     / 562 U/L / x     / x      CARDIAC MARKERS ( 08 May 2020 12:39 )  x     / x     / 787 U/L / x     / x          Urinalysis Basic - ( 08 May 2020 12:57 )    Color: Yellow / Appearance: Slightly Turbid / S.016 / pH: x  Gluc: x / Ketone: Negative  / Bili: Negative / Urobili: Negative   Blood: x / Protein: 30 mg/dL / Nitrite: Negative   Leuk Esterase: Negative / RBC: 0 /hpf / WBC 2 /HPF   Sq Epi: x / Non Sq Epi: 0 / Bacteria: Negative        Culture - Blood (collected 08 May 2020 22:14)  Source: .Blood Blood-Venous  Preliminary Report (09 May 2020 23:01):    No growth to date.    Culture - Blood (collected 08 May 2020 22:14)  Source: .Blood Blood-Peripheral  Preliminary Report (09 May 2020 23:01):    No growth to date.    Culture - Urine (collected 08 May 2020 15:09)  Source: .Urine Clean Catch (Midstream)  Final Report (09 May 2020 11:08):    No growth          RADIOLOGY & ADDITIONAL TESTS:    < from: CT Pelvis Bony Only No Cont (20 @ 17:56) >  IMPRESSION:    Multiple sclerotic lesions in the bony pelvis as described above, concerning for metastatic disease. No acute displaced fracture is seen.    Nonspecific focal increased attenuation measuring approximately 3.7 cm in the left gluteus jessica muscle measuring 43 Hounsfield units, question intramuscular hematoma. Correlate clinically.    Small nonspecific bilateral iliac chain lymph nodes and small para-aortic lymph nodes.     No free fluid is seen in the pelvis.    Small sclerotic lesion in the left pedicle at L4 measuring up to 8 mm.      < end of copied text >

## 2020-05-10 NOTE — PROGRESS NOTE ADULT - PROBLEM SELECTOR PLAN 1
Presented with weakness in his lower extremities. Has urinary retention and no bowel incontinence. ANDREE by neurosurgery has decreased rectal tone.   - Has had multiple imaging modalities MRI of the T/L that is incomplete due to claustrophobia seems like by imaging no signs of cord compression but recommending MRI of the pelvis  - Per neurosurgery seems no signs of acute cord compression but imaging is not complete, believe it is due to chronic progressive weakness? and metastatic disease not recommending Decadron at this moment or any acute surgical interventions  - Neuro checks Q4H  - PT consult   - Trend CK s/p IVF Presented with weakness in his lower extremities. Has urinary retention and no bowel incontinence. ANDREE by neurosurgery has decreased rectal tone.   - Has had multiple imaging modalities MRI of the T/L that is incomplete due to claustrophobia seems like by imaging no signs of cord compression but recommending MRI of the pelvis  - Will attempt MRI again with anesthesia as patient has not been able to tolerate last 2 attempts   - Neuro checks Q4H  - PT consult   - Trend CK s/p IVF

## 2020-05-10 NOTE — PROGRESS NOTE ADULT - SUBJECTIVE AND OBJECTIVE BOX
Patient seen and examined at bedside.    --Anticoagulation--    T(C): 37.1 (05-10-20 @ 08:48), Max: 37.2 (05-09-20 @ 15:41)  HR: 100 (05-10-20 @ 08:48) (91 - 102)  BP: 152/67 (05-10-20 @ 08:48) (118/68 - 154/71)  RR: 18 (05-10-20 @ 08:48) (18 - 20)  SpO2: 93% (05-10-20 @ 08:48) (91% - 96%)  Wt(kg): --    Exam: AAOx3, FC, UE 5/5, BLE diffusely 2/5. SILT. No-hyperreflexia. Decreased rectal tone. Retaining, now has mccann.

## 2020-05-10 NOTE — PROGRESS NOTE ADULT - PROBLEM SELECTOR PLAN 5
Baseline SCr 1.2-1.4. Has not been urinating the past few days. BUN/Cr ~25. Could be combination of obstructive and pre-renal   - Fuller in place, monitor UOP, trend Cr  - Monitor electrolytes

## 2020-05-10 NOTE — PROGRESS NOTE ADULT - ATTENDING COMMENTS
74M CAD, CABG, prostate cancer aw fall, weakness, retention. LE power 3-4/5. MRI L spine and pelvis to be scheduled under anesthesia.

## 2020-05-11 NOTE — PROGRESS NOTE ADULT - PROBLEM SELECTOR PLAN 1
Presented with weakness in his lower extremities. Has urinary retention and no bowel incontinence. ANDREE by neurosurgery has decreased rectal tone.   - Has had multiple imaging modalities MRI of the T/L that is incomplete due to claustrophobia seems like by imaging no signs of cord compression but recommending MRI of the pelvis  - Will attempt MRI again with anesthesia as patient has not been able to tolerate last 2 attempts   - Neuro checks Q4H  - PT consult Presented with weakness in his lower extremities. Has urinary retention and no bowel incontinence. ANDREE by neurosurgery has decreased rectal tone.   - Has had multiple imaging modalities MRI of the T/L that is incomplete due to claustrophobia seems like by imaging no signs of cord compression but recommending MRI of the pelvis  - Will attempt MRI again with anesthesia as patient has not been able to tolerate last 2 attempts   - Neuro checks Q4H  - PT consult  -F/u CAMDEN recs.  -CAMDEN does not recommend steroids at this time.

## 2020-05-11 NOTE — PROGRESS NOTE ADULT - SUBJECTIVE AND OBJECTIVE BOX
Patient is a 74y old  Male who presents with a chief complaint of Weakness (10 May 2020 09:05)      SUBJECTIVE / OVERNIGHT EVENTS: Patient with agitation and confusion overnight. Patient also attempting to pull out mccann catheter, patient received haldol x1 for agitation.      MEDICATIONS  (STANDING):  dextrose 5%. 1000 milliLiter(s) (50 mL/Hr) IV Continuous <Continuous>  dextrose 50% Injectable 12.5 Gram(s) IV Push once  dextrose 50% Injectable 25 Gram(s) IV Push once  dextrose 50% Injectable 25 Gram(s) IV Push once  enoxaparin Injectable 40 milliGRAM(s) SubCutaneous daily  insulin lispro (HumaLOG) corrective regimen sliding scale   SubCutaneous three times a day before meals  insulin lispro (HumaLOG) corrective regimen sliding scale   SubCutaneous at bedtime  piperacillin/tazobactam IVPB.. 3.375 Gram(s) IV Intermittent every 8 hours  polyethylene glycol 3350 17 Gram(s) Oral two times a day  senna 2 Tablet(s) Oral at bedtime    MEDICATIONS  (PRN):  dextrose 40% Gel 15 Gram(s) Oral once PRN Blood Glucose LESS THAN 70 milliGRAM(s)/deciliter  glucagon  Injectable 1 milliGRAM(s) IntraMuscular once PRN Glucose LESS THAN 70 milligrams/deciliter  haloperidol    Injectable 1 milliGRAM(s) IntraMuscular every 6 hours PRN Agitation      Vital Signs Last 24 Hrs  T(C): 36.9 (10 May 2020 23:38), Max: 37.1 (10 May 2020 08:48)  T(F): 98.5 (10 May 2020 23:38), Max: 98.8 (10 May 2020 08:48)  HR: 93 (10 May 2020 23:38) (93 - 101)  BP: 160/72 (10 May 2020 23:38) (136/66 - 160/72)  BP(mean): --  RR: 18 (10 May 2020 23:38) (18 - 18)  SpO2: 92% (10 May 2020 23:38) (92% - 94%)  CAPILLARY BLOOD GLUCOSE      POCT Blood Glucose.: 204 mg/dL (10 May 2020 21:04)  POCT Blood Glucose.: 176 mg/dL (10 May 2020 16:29)  POCT Blood Glucose.: 175 mg/dL (10 May 2020 11:43)  POCT Blood Glucose.: 153 mg/dL (10 May 2020 08:01)    I&O's Summary    10 May 2020 07:01  -  11 May 2020 07:00  --------------------------------------------------------  IN: 350 mL / OUT: 850 mL / NET: -500 mL        PHYSICAL EXAM:  GENERAL: NAD, well-developed  EYES: EOMI, PERRLA, conjunctiva and sclera clear  NECK: Supple, No JVD  CHEST/LUNG: Clear to auscultation bilaterally; No wheeze  HEART: Regular rate and rhythm; No murmurs, rubs, or gallops  ABDOMEN: Soft, Nontender, Nondistended; Bowel sounds present  EXTREMITIES:  2+ Peripheral Pulses, No clubbing, cyanosis, or edema  PSYCH: AAOx3  NEUROLOGY: non-focal      LABS:                        10.3   17.11 )-----------( 131      ( 10 May 2020 09:02 )             33.2     05-10    140  |  105  |  44<H>  ----------------------------<  187<H>  3.7   |  20<L>  |  2.03<H>    Ca    9.2      10 May 2020 09:02  Phos  3.2     05-10  Mg     2.1     05-10                Culture - Blood (collected 08 May 2020 22:14)  Source: .Blood Blood-Venous  Preliminary Report (09 May 2020 23:01):    No growth to date.    Culture - Blood (collected 08 May 2020 22:14)  Source: .Blood Blood-Peripheral  Preliminary Report (09 May 2020 23:01):    No growth to date.    Culture - Urine (collected 08 May 2020 15:09)  Source: .Urine Clean Catch (Midstream)  Final Report (09 May 2020 11:08):    No growth

## 2020-05-11 NOTE — PROGRESS NOTE ADULT - PROBLEM SELECTOR PLAN 6
History of CABG. No history of stents  - Will need to clarify meds History of CABG. No history of stents  - Will need to clarify meds and resume as appropriate.

## 2020-05-11 NOTE — PROGRESS NOTE ADULT - PROBLEM SELECTOR PLAN 9
DVT PPx: SCDs,  Diet: DASH/TLC, Consistent Carb DVT PPx: Lovenox SQ.   Diet: DASH/TLC, Consistent Carb  -F/u PT recs.

## 2020-05-11 NOTE — PROGRESS NOTE ADULT - ATTENDING COMMENTS
-Patient seen/examined on 5/11/20. Case/plan discussed with the intern and resident as reviewed/edited by me above and in any comments below.  -F/u CAMDEN recs. -Will attempt to pursue MRI LS spine/pelvis under anesthesia.   -US duplex of LE's.   -IV lasix. Wean O2.   -F/u repeat COVID swab. -F/u CRP, ferritin, procalcitonin.

## 2020-05-11 NOTE — PROGRESS NOTE ADULT - PROBLEM SELECTOR PLAN 3
- on CT spine sclerotic and lytic pattern to the right iliac crest with asymmetric right iliac is muscle could represent pelvic hematoma due to adjacent pathologic nondisplaced fracture versus soft tissue neoplasm.  - Spoke to radiology ideally would need a contrast study to evaluate further but given his MARJORIE on CKD unable to do so, had CT bony pelvis which would help identify further  - plan to repeat MRI of pelvis with anesthesia given patient unable to tolerate procedure.

## 2020-05-11 NOTE — PROGRESS NOTE ADULT - PROBLEM SELECTOR PLAN 2
- History of prostate cancer diagnosed many years ago on active chemo. Denies RT or surgeries. [facility] Dr[facility] Oneil. Based on imaging has diffuse metastatic lesions to the bone.   - CT showing multiple sclerotic lesions in bony pelvis concerning for metastatic disease. - History of prostate cancer diagnosed many years ago on active chemo. Denies RT or surgeries. Managed by [facility]rologist at [facility] Dr[facility] Oneil. Based on imaging has diffuse metastatic lesions to the bone.   - CT showing multiple sclerotic lesions in bony pelvis concerning for metastatic disease.  -Will check PSA.  -C/w Fuller for urinary retention for now.  -F/u with outpatient oncologist.

## 2020-05-11 NOTE — PROGRESS NOTE ADULT - PROBLEM SELECTOR PLAN 8
Had low SBPs in the 100s. Reportedly on antihypertensives at home.  - Will hold off on antihypertensives for now if BP continues to rise can consider PRN hydralazine or labetalol Had low SBPs in the 100s on admission. Reportedly on antihypertensives at home.  - BP increasing. BNP elevated and mild hypoxia noted.   -Gave IV lasix. Home lasix and amlodipine resumed for now.

## 2020-05-11 NOTE — PROGRESS NOTE ADULT - PROBLEM SELECTOR PLAN 4
Neutrophilic predominant leukocytosis. No fevers here or at home as per patient. No other infectious symptoms. UA appears negative. CXR shows possible RML opacity. Normal lactate. s/p Zosyn and Vancomycin.   - BCx and UCx NGTD   - Will continue Zosyn for now. Neutrophilic predominant leukocytosis. No fevers here or at home as per patient. No other infectious symptoms. UA appears negative. CXR shows possible RML opacity. Normal lactate. s/p Zosyn and Vancomycin.   - BCx and UCx NGTD   - Will continue Zosyn for now.  -US duplex of LE's to eval for DVT.  -Consider ID/heme evals if leukocytosis persists unexplained.  -Some hypoxia noted, f/u CXR. BNP elevated. Lasix given/resumed.  -Sputum culture. Consider MSSA/MRSA swab.

## 2020-05-11 NOTE — PROGRESS NOTE ADULT - SUBJECTIVE AND OBJECTIVE BOX
Patient seen and examined at bedside.    --Anticoagulation--    T(C): 37.1 (05-10-20 @ 08:48), Max: 37.2 (05-09-20 @ 15:41)  HR: 100 (05-10-20 @ 08:48) (91 - 102)  BP: 152/67 (05-10-20 @ 08:48) (118/68 - 154/71)  RR: 18 (05-10-20 @ 08:48) (18 - 20)  SpO2: 93% (05-10-20 @ 08:48) (91% - 96%)  Wt(kg): --    Exam: AAOx3, FC, UE 5/5, BLE diffusely 3/5. SILT. No-hyperreflexia.

## 2020-05-12 NOTE — PROGRESS NOTE ADULT - ATTENDING COMMENTS
-Patient seen/examined on 5/12/20. Case/plan discussed with the intern and resident as reviewed/edited by me above and in any comments below.   -Pulm and ID recs appreciated.  -ABG shows some CO2 retention, which may be contributing to his worsened mental status. Will try Bipap. Aspiration precautions. Monitor for mental status improvement afterwards.   -PE high likelihood, therefore will empirically AC with heparin drip for now. Discuss with wife prior to initiating. No known brain mets, so brain bleed unlikely. -Unable to get VQ scan given COVID crisis. -Hesitant to do CTA chest given CKD, will consider renal input tomorrow if necessary. -Otherwise, will check other surrogate markers of PE. -Echo, CE's, f/u EKG.   -Will attempt to get MRI brain, LS spine, and pelvis under anesthesia when safe to do so. Consider EEG and LP. Consider neuro eval. -Will check TSH, B12, RPR, NH3.   -PSA reportedly has increased compared to outpatient records, suggesting worsening of disease. -Patient seen/examined on 5/12/20. Case/plan discussed with the intern and resident as reviewed/edited by me above and in any comments below.   -Pulm and ID recs appreciated.  -Leukocytosis possibly due to underlying PE/malignancy. Will hold abx for now, per ID recs.   -ABG shows some CO2 retention, which may be contributing to his worsened mental status. Will try Bipap. Aspiration precautions. Monitor for mental status improvement afterwards.   -PE high likelihood, therefore will empirically AC with heparin drip for now. Discuss with wife prior to initiating. No known brain mets, so brain bleed unlikely. -Unable to get VQ scan given COVID crisis. -Hesitant to do CTA chest given CKD, will consider renal input tomorrow if necessary. -Otherwise, will check other surrogate markers of PE. -Echo, CE's, f/u EKG.   -Will attempt to get MRI brain, LS spine, and pelvis under anesthesia when safe to do so. Consider EEG and LP. Consider neuro eval. -Will check TSH, B12, RPR, NH3.   -PSA reportedly has increased compared to outpatient records, suggesting worsening of disease. -Patient seen/examined on 5/12/20. Case/plan discussed with the intern and resident as reviewed/edited by me above and in any comments below.   -Pulm and ID recs appreciated.  -Leukocytosis possibly due to underlying PE/malignancy. Will hold abx for now, per ID recs.   -ABG shows some CO2 retention, which may be contributing to his worsened mental status. Will try Bipap. Aspiration precautions. Monitor for mental status improvement afterwards. -Low threshold for MICU eval if doesn't improve with Bipap.   -PE high likelihood, therefore will empirically AC with heparin drip for now. Discuss with wife prior to initiating. No known brain mets on recent imaging, so brain bleed unlikely. -Unable to get VQ scan given COVID crisis. -Hesitant to do CTA chest given CKD, will consider renal input tomorrow if necessary. -Otherwise, will check other surrogate markers of PE. -Echo, CE's, f/u EKG. -Likely needs telemetry for cardiac monitoring.   -Will attempt to get MRI brain, LS spine, and pelvis under anesthesia when safe to do so. Consider EEG and LP. Consider neuro eval. -Will check TSH, B12, RPR, NH3.   -PSA reportedly has increased compared to outpatient records, suggesting worsening of disease. -Patient seen/examined on 5/12/20. Case/plan discussed with the intern and resident as reviewed/edited by me above and in any comments below.   -Pulm and ID recs appreciated.  -Leukocytosis possibly due to underlying PE/malignancy. Will hold abx for now, per ID recs.   -ABG shows some CO2 retention, which may be contributing to his worsened mental status. Will try Bipap. Aspiration precautions. Monitor for mental status improvement afterwards. -Low threshold for MICU eval if doesn't improve with Bipap.   -PE high likelihood, therefore will empirically AC with heparin drip for now. Discuss with wife prior to initiating. No known brain mets on recent imaging, so brain bleed unlikely. -Unable to get VQ scan given COVID crisis. -Hesitant to do CTA chest given CKD, will consider renal input tomorrow if necessary. -Otherwise, will check other surrogate markers of PE. -Echo, CE's, f/u EKG. -Likely needs telemetry for cardiac monitoring.   -Will attempt to get MRI brain, LS spine, and pelvis under anesthesia when safe to do so. Consider EEG and LP. Consider neuro eval. -Will check TSH, B12, RPR, NH3. -C/w neuro checks Q4H.   -PSA reportedly has increased compared to outpatient records, suggesting worsening of disease.

## 2020-05-12 NOTE — PROGRESS NOTE ADULT - PROBLEM SELECTOR PLAN 9
Had low SBPs in the 100s on admission. Reportedly on antihypertensives at home.  - BP increasing. BNP elevated and mild hypoxia noted.   - Gave IV lasix. Home lasix and amlodipine resumed for now. Had low SBPs in the 100s on admission. Reportedly on antihypertensives at home.  - BP was increasing. BNP elevated and mild hypoxia noted.   - Gave IV lasix. Home lasix and amlodipine resumed.

## 2020-05-12 NOTE — PROGRESS NOTE ADULT - PROBLEM SELECTOR PLAN 10
DVT PPx: Lovenox SQ.   Diet: DASH/TLC, Consistent Carb  -F/u PT recs. DVT PPx: Lovenox SQ. May need full AC.   Diet: DASH/TLC, Consistent Carb  -F/u PT recs.

## 2020-05-12 NOTE — PROGRESS NOTE ADULT - SUBJECTIVE AND OBJECTIVE BOX
Patient is a 74y old  Male who presents with a chief complaint of Weakness (11 May 2020 11:03)      SUBJECTIVE / OVERNIGHT EVENTS: Patient with hypoxia overnight to 88% requiring up to 6L NC. Patient also agitated overnigth, pulling at lines requiring mitten restraints. Repeat COVID PCR negative. Received addnl 20mg IV lasix overnight.     MEDICATIONS  (STANDING):  amLODIPine   Tablet 5 milliGRAM(s) Oral daily  dextrose 5%. 1000 milliLiter(s) (50 mL/Hr) IV Continuous <Continuous>  dextrose 50% Injectable 12.5 Gram(s) IV Push once  dextrose 50% Injectable 25 Gram(s) IV Push once  dextrose 50% Injectable 25 Gram(s) IV Push once  enoxaparin Injectable 40 milliGRAM(s) SubCutaneous daily  furosemide    Tablet 20 milliGRAM(s) Oral daily  insulin lispro (HumaLOG) corrective regimen sliding scale   SubCutaneous three times a day before meals  insulin lispro (HumaLOG) corrective regimen sliding scale   SubCutaneous at bedtime  piperacillin/tazobactam IVPB.. 3.375 Gram(s) IV Intermittent every 8 hours  polyethylene glycol 3350 17 Gram(s) Oral two times a day  potassium chloride   Powder 40 milliEquivalent(s) Oral once  senna 2 Tablet(s) Oral at bedtime    MEDICATIONS  (PRN):  dextrose 40% Gel 15 Gram(s) Oral once PRN Blood Glucose LESS THAN 70 milliGRAM(s)/deciliter  glucagon  Injectable 1 milliGRAM(s) IntraMuscular once PRN Glucose LESS THAN 70 milligrams/deciliter  haloperidol    Injectable 1 milliGRAM(s) IntraMuscular every 6 hours PRN Agitation      Vital Signs Last 24 Hrs  T(C): 37.4 (12 May 2020 04:49), Max: 37.4 (12 May 2020 04:49)  T(F): 99.3 (12 May 2020 04:49), Max: 99.3 (12 May 2020 04:49)  HR: 108 (12 May 2020 04:49) (92 - 108)  BP: 137/66 (12 May 2020 04:49) (129/66 - 157/71)  BP(mean): --  RR: 22 (12 May 2020 04:49) (18 - 22)  SpO2: 90% (12 May 2020 04:49) (90% - 95%)  CAPILLARY BLOOD GLUCOSE      POCT Blood Glucose.: 158 mg/dL (11 May 2020 21:44)  POCT Blood Glucose.: 176 mg/dL (11 May 2020 17:12)  POCT Blood Glucose.: 176 mg/dL (11 May 2020 12:08)  POCT Blood Glucose.: 188 mg/dL (11 May 2020 08:21)    I&O's Summary    11 May 2020 07:01  -  12 May 2020 07:00  --------------------------------------------------------  IN: 0 mL / OUT: 1100 mL / NET: -1100 mL        PHYSICAL EXAM:  GENERAL: NAD, well-developed  HEAD:  Atraumatic, Normocephalic  EYES: EOMI, PERRLA, conjunctiva and sclera clear  NECK: Supple, No JVD  CHEST/LUNG: Clear to auscultation bilaterally; No wheeze  HEART: Regular rate and rhythm; No murmurs, rubs, or gallops  ABDOMEN: Soft, Nontender, Nondistended; Bowel sounds present  EXTREMITIES:  2+ Peripheral Pulses, No clubbing, cyanosis, or edema  PSYCH: AAOx3  NEUROLOGY: non-focal  SKIN: No rashes or lesions    LABS:                        10.3   17.30 )-----------( 236      ( 12 May 2020 06:52 )             34.0     05-12    141  |  102  |  54<H>  ----------------------------<  217<H>  3.3<L>   |  18<L>  |  2.38<H>    Ca    9.2      12 May 2020 06:54  Phos  4.7     05-12  Mg     2.3     05-12    TPro  6.5  /  Alb  2.6<L>  /  TBili  0.3  /  DBili  0.1  /  AST  17  /  ALT  13  /  AlkPhos  358<H>  05-12                  RADIOLOGY & ADDITIONAL TESTS:    Imaging Personally Reviewed:    Consultant(s) Notes Reviewed:      Care Discussed with Consultants/Other Providers:

## 2020-05-12 NOTE — PROGRESS NOTE ADULT - PROBLEM SELECTOR PLAN 3
- History of prostate cancer diagnosed many years ago on active chemo. Denies RT or surgeries. [facility] Oneil. Based on imaging has diffuse metastatic lesions to the bone.   - CT showing multiple sclerotic lesions in bony pelvis concerning for metastatic disease.  - Will check PSA.  - C/w Fuller for urinary retention for now.  - F/u with outpatient oncologist. - History of prostate cancer diagnosed many years ago on active chemo. Denies RT or surgeries. Man[facility] Dr[facility] Oneil. Based on imaging has diffuse metastatic lesions to the bone.   - CT showing multiple sclerotic lesions in bony pelvis concerning for metastatic disease.  - PSA significantly elevated.  - C/w Fuller for urinary retention for now.  - F/u with outpatient oncologist.

## 2020-05-12 NOTE — CHART NOTE - NSCHARTNOTEFT_GEN_A_CORE
Spoke with outpatient urologist/surgical oncologist Dr. Oneil, stating that patient has not had previous MRIs. However, based on previous CT scans patient without any known brain mets.

## 2020-05-12 NOTE — PROGRESS NOTE ADULT - PROBLEM SELECTOR PLAN 1
- Patient on 6L NC sating 90-95%, s/p 20 mg IV lasix overnight (received a total 3 doses)  - patient with tachycardiac to 100s, hx of prostate cancer with mets; concern for PE  - would like CTA, however patient with significant MARJORIE; plan for V/Q scan - Patient on 6L NC sating 90-95%, s/p 20 mg IV lasix overnight (received a total 3 doses)  - patient with tachycardiac to 100s, hx of prostate cancer with mets; concern for PE  - would like CTA, however patient with significant MARJORIE; plan for V/Q scan  -F/u pulm recs.

## 2020-05-12 NOTE — CHART NOTE - NSCHARTNOTEFT_GEN_A_CORE
Called to evaluate pt for increased WOB.    On exam, pt appears obtunded non responsive to verbal stimuli. Responsive to pain    Vitals: BP: 112/55 HR: 92 T:98.5 RR: 22 Sat: 90% 5L NC    Physical:  Gen: Obese male, appears obtunded, mouth breathing  Chest: Diffuse expiratory wheezing B/L  Heart: S1S2 normal  Abd: Soft NTND  Ext: Edema b/l    A/P  74 year old male with a PMH of CAD s/p CABG, T2DM, HTN, HLD, Prostate Cancer on chemo presenting with bilateral lower extremity weakness concerning for cord compression, found to have metastatic disease. Hospital course now c/b hypoxia likely 2/2 PE    -Start BiPAP  -Continuous pulse ox  -EKG x 2 today sig for bifascicular block.   -Would move pt to tele as extent of PE unclear at this time  -Low threshold for intubation if pt is unable to tolerate BiPAP Called to evaluate pt for increased WOB.    On exam, pt appears obtunded non responsive to verbal stimuli. Responsive to pain    Vitals: BP: 112/55 HR: 92 T:98.5 RR: 22 Sat: 90% 5L NC    Physical:  Gen: Obese male, appears obtunded, mouth breathing  Chest: Diffuse expiratory wheezing B/L  Heart: S1S2 normal  Abd: Soft NTND  Ext: Edema b/l    A/P  74 year old male with a PMH of CAD s/p CABG, T2DM, HTN, HLD, Prostate Cancer on chemo presenting with bilateral lower extremity weakness concerning for cord compression, found to have metastatic disease. Hospital course now c/b hypoxia likely 2/2 PE, now w/ AMS likely 2/2 metabolic encephalopathy in setting of hypercapnia     -Start BiPAP  -Continuous pulse ox  -EKG x 2 today sig for bifascicular block.   -Would move pt to tele as extent of PE unclear at this time  -Might need intubation if pt is unable to tolerate BiPAP. Avoiding intubation unless absolutely necessary 2/2 probable high morbidity and mortality

## 2020-05-12 NOTE — CONSULT NOTE ADULT - SUBJECTIVE AND OBJECTIVE BOX
HPI: 74M PMH CAD s/p CABG, prostate ca s/p chemo presents with weakness to the ER . Pt states yesterday he attempted to stand up from couch and felt weak in his legs so he fell to the ground. Was on the floor about 1hr until his son came home and helped him up. States he feels like his R leg is weak. Usually walks at home with a walker but has not been able to since yesterday. Pt denies saddle anesthesia, denies bowel/bladder incont. States does have urinary retention, has not urinated for a few days.  HPI from ED and review of medical records as pt is unable to speak and is mildly obtunded   Tested Covid  neg x 2       =====================  PAST MEDICAL HISTORY   HTN (hypertension)  Prostate cancer metastatic to bone (spine) undergoing chemo   Hyperlipidemia  Neuropathy  Diabetes mellitus, type 2  CAD (coronary artery disease)  CKD/MARJORIE     PAST SURGICAL HISTORY   History of cholecystectomy  H/O coronary artery bypass surgery  FH: coronary artery bypass surgery    MEDICATIONS  (STANDING):  amLODIPine   Tablet 5 milliGRAM(s) Oral daily  dextrose 5%. 1000 milliLiter(s) (50 mL/Hr) IV Continuous <Continuous>  dextrose 50% Injectable 12.5 Gram(s) IV Push once  dextrose 50% Injectable 25 Gram(s) IV Push once  dextrose 50% Injectable 25 Gram(s) IV Push once  furosemide    Tablet 20 milliGRAM(s) Oral daily  heparin   Injectable 5000 Unit(s) SubCutaneous every 8 hours  insulin lispro (HumaLOG) corrective regimen sliding scale   SubCutaneous three times a day before meals  insulin lispro (HumaLOG) corrective regimen sliding scale   SubCutaneous at bedtime  piperacillin/tazobactam IVPB.. 3.375 Gram(s) IV Intermittent every 8 hours  polyethylene glycol 3350 17 Gram(s) Oral two times a day  potassium chloride   Powder 40 milliEquivalent(s) Oral once  senna 2 Tablet(s) Oral at bedtime    MEDICATIONS  (PRN):  dextrose 40% Gel 15 Gram(s) Oral once PRN Blood Glucose LESS THAN 70 milliGRAM(s)/deciliter  glucagon  Injectable 1 milliGRAM(s) IntraMuscular once PRN Glucose LESS THAN 70 milligrams/deciliter  haloperidol    Injectable 1 milliGRAM(s) IntraMuscular every 6 hours PRN Agitation        SOCIAL HISTORY:  lives at home       FAMILY HISTORY:  No pertinent family history in first degree relatives      ROS: unable at this time minimally verbal      LABS:                        10.3   17.30 )-----------( 236      ( 12 May 2020 06:52 )             34.0        05-12    141  |  102  |  54<H>  ----------------------------<  217<H>  3.3<L>   |  18<L>  |  2.38<H>    Ca    9.2      12 May 2020 06:54  Phos  4.7     05-12  Mg     2.3     05-12    TPro  6.5  /  Alb  2.6<L>  /  TBili  0.3  /  DBili  0.1  /  AST  17  /  ALT  13  /  AlkPhos  358<H>  05-12    D-Dimer Assay, Quantitative (05.11.20 @ 14:07)    D-Dimer Assay, Quantitative: 925: D-Dimer result less than 230 ng/mL DDU correlates with the absence  of thrombosis in a patient with a low and moderate       pre-test probability of thrombosis.  1 DDU is approximately equal to  2 ng/mL FEU (previous units). ng/mL DDU    Activated Partial Thromboplastin Time: 30.5: The recommended therapeutic heparin range (full dose) is 58-99 seconds.  Recommended therapeutic Argatroban range is 1.5 to 3.0 times the baseline  APTT value, not to exceed 100 seconds. Recommended therapeutic Refludan  range is 1.5 to 2.5 times thebaseline APTT.  Effective October 30, 2018 the reference range has changed. sec (05.08.20 @ 12:39)    < from: Xray Chest 1 View- PORTABLE-Urgent (05.12.20 @ 03:34) >  EXAM:  XR CHEST PORTABLE URGENT 1V                            PROCEDURE DATE:  05/12/2020            INTERPRETATION:  A single chest x-ray was obtained on 8/12/2020.    Indication: Hypoxia.    Impression:    The heart is normal in size. Bibasilar platelike atelectasis. Status post sternotomy. No pneumothorax.                    KAYLA CARRERO M.D., ATTENDING RADIOLOGIST  This document has been electronically signed. May 12 2020  8:40AM          < end of copied text >  < from: CT Head No Cont (05.11.20 @ 08:37) >  EXAM:  CT BRAIN                            PROCEDURE DATE:  05/11/2020        INTERPRETATION:  History: Confusion. Agitation. Lower extremity weakness. Altered mental status.        Comparison is made to head CT study from 05/16/2016.    There is no evidence for acute infarct, acute hemorrhage, mass effect, calvarial fracture, or hydrocephalus. A chronic left thalamic lacunar infarct is unchanged.    Moderate patchy hypodensity without mass effect is noted in the periventricular white matter which most likely represents chronic microvascular ischemic changes given the patient's age.    Cerebral volume loss is present with secondary proportional prominence of the sulci and ventricles.    A chronic left medial orbital wall fracture is unchanged. The paranasal sinuses demonstrate mild mucosal thickening.    The mastoid air cells and middle ear cavities are clear.    No lytic or blastic destructive calvarial lesions are noted.    IMPRESSION:    No acute intracranial pathology is noted. If the patient has new and persistent symptoms, consider short interval follow-up head CT or brain MRI follow-up if there are no MRI contraindications.                    MALA ARAUJO M.D., ATTENDING RADIOLOGIST  This document has been electronically signed. May 11 2020  8:52AM    < end of copied text >    exam     Vital Signs Last 24 Hrs  T(C): 37.4 (12 May 2020 04:49), Max: 37.4 (12 May 2020 04:49)  T(F): 99.3 (12 May 2020 04:49), Max: 99.3 (12 May 2020 04:49)  HR: 108 (12 May 2020 04:49) (92 - 108)  BP: 137/66 (12 May 2020 04:49) (129/66 - 157/71)  BP(mean): --  RR: 22 (12 May 2020 04:49) (18 - 22)  SpO2: 90% (12 May 2020 04:49) (90% - 95%)Description: A noncontrast head CT was performed. Axial images were performed from the skull base to the vertex with coronal/sagittal reconstructions.      Gen: In NAD   head: NC/AT   eyes: opens on command   Neck: Supple no JVD no Bruit   Lungs: CTA bi lat no rhonchi, crackles, wheezing no resp distress   Heart: S1> S2 tachy   Abdomen: Soft N/T N/D normal BS   ext: edema bi lat   Neuro: speech single words on command can move arms and legs on command toes upward with babinski    Abdomen: Soft N/T N/D     medical problems     HTN (hypertension)  Prostate cancer metastatic to bone (spine) undergoing chemo   Hyperlipidemia  Neuropathy  Diabetes mellitus, type 2  CAD (coronary artery disease)  CKD/MARJORIE              REC   - Consider High Risk for PE given clinical presentation/risks and Wells PE Score (10) would consider PE until proven not to have   - continue full anticoagulation with  Heparin, consider normogram for guidance   - consider Renal consult for MARJORIE-CKD and tolerability for CTA    -consider Re-Consult from Neuro/ change in Mental status r/o event   - repeat MRI Brain  -VQ scan although not optimal until pt able to tolerate CTA

## 2020-05-12 NOTE — PROVIDER CONTACT NOTE (OTHER) - BACKGROUND
pt.  admit with lower extremity weakness; pt. respiratory status cahnged; reswabbed for covid results pending

## 2020-05-12 NOTE — PROGRESS NOTE ADULT - PROBLEM SELECTOR PLAN 5
Neutrophilic predominant leukocytosis. No fevers here or at home as per patient. No other infectious symptoms. UA appears negative. CXR shows possible RML opacity. Normal lactate. s/p Zosyn and Vancomycin in ED.   - BCx and UCx NGTD   - Will continue Zosyn for now.  -US duplex of LE's to eval for DVT.  -Consider ID/heme evals if leukocytosis persists unexplained.  -Some hypoxia noted, f/u CXR. BNP elevated. Lasix given/resumed.  -Sputum culture. Consider MSSA/MRSA swab. Neutrophilic predominant leukocytosis. No fevers here or at home as per patient. No other infectious symptoms. UA appears negative. CXR shows possible RML opacity. Normal lactate. s/p Zosyn and Vancomycin in ED.   - BCx and UCx NGTD   - Will continue Zosyn for now, pending ID recs.  -US duplex of LE's was negative. -VTE work up.   -F/u ID recs.   -Some hypoxia noted, f/u repeat CXR. BNP elevated. Lasix given/resumed.  -Sputum culture. Consider MSSA/MRSA swab.

## 2020-05-12 NOTE — PROGRESS NOTE ADULT - PROBLEM SELECTOR PLAN 8
In the past on Metformin and Glipizide.   - Will place on low ISS AC/QHS  - Goal fingersticks 120 - 180  - A1c 8.4 In the past on Metformin and Glipizide.   - C/w low ISS AC/QHS  - Goal fingersticks 120 - 180  - A1c 8.4

## 2020-05-12 NOTE — CONSULT NOTE ADULT - PROBLEM SELECTOR RECOMMENDATION 9
- P/w WBC 18.89, started on Zosyn. Also given 1x vancomycin on the day of admission.  - WBC persistently at 17s-18s despite continued antibiotics.  - BCx x2 and UCx NGTD (5/8). COVID-19 neg 5/8 and 5/11.  - Afebrile while patient has been hospitalized.  - Unclear infectious source. Need to rule out PE first.

## 2020-05-12 NOTE — PROGRESS NOTE ADULT - PROBLEM SELECTOR PLAN 2
Presented with weakness in his lower extremities. Has urinary retention and no bowel incontinence. ANDREE by neurosurgery has decreased rectal tone.   - Has had multiple imaging modalities MRI of the T/L that is incomplete due to claustrophobia seems like by imaging no signs of cord compression but recommending MRI of the pelvis  - Will attempt MRI again with anesthesia as patient has not been able to tolerate last 2 attempts   - Neuro checks Q4H  - PT consult  -F/u [facility] recs.  -[facility] does not recommend steroids at this time. Presented with weakness in his lower extremities. Has urinary retention and no bowel incontinence. ANDREE by neurosurgery has decreased rectal tone.   - Has had multiple imaging modalities MRI of the T/L that is incomplete due to claustrophobia seems like by imaging no signs of cord compression but recommending MRI of the pelvis  - Will attempt MRI again with anesthesia as patient has not been able to tolerate last 2 attempts   - Neuro checks Q4H  - PT consult  -F/u [facility] recs.  -[facility] does not recommend steroids at this time.  -Get MRI brain when able.

## 2020-05-12 NOTE — CONSULT NOTE ADULT - ATTENDING COMMENTS
agree with above assessment/plan    high suspicion for PE, cont full ac, obtain VQ agree with above assessment/plan    high suspicion for PE, consider repeat head imaging, would start empiric ac with heparin drip, obtain VQ  noted to be hypercapnic, would start bipap 10/5   GOC discussion agree with above assessment/plan    high suspicion for PE, would start empiric ac with heparin drip, obtain VQ  noted to be hypercapnic, would start bipap 10/5   GOC discussion

## 2020-05-12 NOTE — CONSULT NOTE ADULT - ATTENDING COMMENTS
74M with prostate ca with bone mets, leukocytosis, episode of hypoxia  -no obvious pna  -cx negative  -favor dc abx  -Pulm input appreciated - possible PE  -leukocytosis could be from PE/metastatic CA    Ran Tong  Attending Physician   Division of Infectious Disease  Pager #[phone]  After 5pm/weekend or no response, call #[phone]

## 2020-05-12 NOTE — PROGRESS NOTE ADULT - PROBLEM SELECTOR PLAN 6
Baseline SCr 1.2-1.4. Has not been urinating the past few days. BUN/Cr ~25. Could be combination of obstructive and pre-renal   - Fuller in place, monitor UOP, trend Cr  - Monitor electrolytes Baseline SCr 1.2-1.4. Has not been urinating the past few days. BUN/Cr ~25. Could be combination of obstructive and pre-renal   - Fuller in place, monitor UOP, trend Cr  - Monitor electrolytes  -May need renal eval.

## 2020-05-12 NOTE — CONSULT NOTE ADULT - PROBLEM SELECTOR RECOMMENDATION 2
- P/w dyspnea, currently requiring 4-6L to maintain low 90s sat.  - Patient has malignancy and poor functional status, predisposing to hypercoagulability. D-dimer 925.  - Given culture negativity, no fever, and no improvement on antibiotics. Recommend PE workup.

## 2020-05-12 NOTE — CONSULT NOTE ADULT - SUBJECTIVE AND OBJECTIVE BOX
Nicolas Drake MD, PhD | PGY-2  Department of Internal Medicine  Pager [phone] ([facility]) / [phone] ([facility])      Patient is a 74y old  Male who presents with a chief complaint of Weakness (12 May 2020 07:55)      HPI:  74-yo M w/ PMH of CAD s/p CABG, prostate CA on chemo, T2DM, HTN, and HLD, p/w bilateral lower extremity weakness. Several days of leg weakness and fall, no LOC or trauma. No fever, dizziness, chest pain, SOB, or palpitations on presentation per H&P. Patient also presented with leukocytosis at 18, and was started on Zosyn. Given vanco x1, however discontinued after 1 dose. BCx and UCx from 5/8 NGTD. COVID neg x2 (5/8 and 5/11). Afebrile since admission. Leukocytosis remained at 17-18s despite antibiotics. D-dimer 925.  this admission (200s-300s in the past per verbal communication with primary team, unclear how long ago). Incompletely performed MRI of L&T spine showed no compression. ID was consulted for leukocytosis.    Patient was seen and examined at bedside. Patient was alert and awake, however appeared confused, making stereotypic R hand movement, unable to carry on a meaningful conversation. Was able to some questions in yes or no. Endorsed SOB. No chest pain, headache, fever, or dizziness.    prior hospital charts reviewed [X]  primary team notes reviewed [X]  other consultant notes reviewed [X]    PAST MEDICAL & SURGICAL HISTORY:  HTN (hypertension)  Prostate cancer  Hyperlipidemia  Neuropathy  Diabetes mellitus, type 2  CAD (coronary artery disease)  History of cholecystectomy  H/O coronary artery bypass surgery      Allergies  No Known Allergies        ANTIMICROBIALS:  piperacillin/tazobactam IVPB.. 3.375 every 8 hours      OTHER MEDS: MEDICATIONS  (STANDING):  amLODIPine   Tablet 5 daily  dextrose 40% Gel 15 once PRN  dextrose 50% Injectable 12.5 once  dextrose 50% Injectable 25 once  dextrose 50% Injectable 25 once  furosemide    Tablet 20 daily  glucagon  Injectable 1 once PRN  haloperidol    Injectable 1 every 6 hours PRN  heparin   Injectable 5000 every 8 hours  insulin lispro (HumaLOG) corrective regimen sliding scale  three times a day before meals  insulin lispro (HumaLOG) corrective regimen sliding scale  at bedtime  polyethylene glycol 3350 17 two times a day  senna 2 at bedtime      SOCIAL HISTORY:   hx smoking  non-smoker    FAMILY HISTORY:  No pertinent family history in first degree relatives      REVIEW OF SYSTEMS  [  ] ROS unobtainable because:    [X] All other systems negative except as noted below:	    Constitutional:  [ ] fever [ ] chills  [ ] weight loss  [ ] weakness  Skin:  [ ] rash [ ] phlebitis	  Eyes: [ ] icterus [ ] pain  [ ] discharge	  ENMT: [ ] sore throat  [ ] thrush [ ] ulcers [ ] exudates  Respiratory: [X] dyspnea [ ] hemoptysis [ ] cough [ ] sputum	  Cardiovascular:  [ ] chest pain [ ] palpitations [ ] edema	  Gastrointestinal:  [ ] nausea [ ] vomiting [ ] diarrhea [ ] constipation [ ] pain	  Genitourinary:  [ ] dysuria [ ] frequency [ ] hematuria [ ] discharge [ ] flank pain  [ ] incontinence  Musculoskeletal:  [ ] myalgias [ ] arthralgias [ ] arthritis  [ ] back pain  Neurological:  [ ] headache [ ] seizures  [ ] confusion/altered mental status  Psychiatric:  [ ] anxiety [ ] depression	  Hematology/Lymphatics:  [ ] lymphadenopathy  Endocrine:  [ ] adrenal [ ] thyroid  Allergic/Immunologic:	 [ ] transplant [ ] seasonal    Vital Signs Last 24 Hrs  T(F): 99.3 (05-12-20 @ 04:49), Max: 99.3 (05-12-20 @ 04:49)    Vital Signs Last 24 Hrs  HR: 108 (05-12-20 @ 04:49) (92 - 108)  BP: 137/66 (05-12-20 @ 04:49) (129/66 - 157/71)  RR: 22 (05-12-20 @ 04:49)  SpO2: 90% (05-12-20 @ 04:49) (90% - 95%)  Wt(kg): --    PHYSICAL EXAM:  Constitutional: appears confused and uncomfortable  HEAD/EYES: anicteric, no conjunctival injection  ENT: supple, no thrush  Cardiovascular: normal S1, S2, no murmur, no edema  Respiratory: no crackles bilaterally; Good R lung field movement, diminished L field breath sounds  GI: soft, non-tender, normal bowel sounds  : +mccann, no CVA tenderness  Musculoskeletal: no synovitis, normal ROM  Neurologic: A&O x1 (self), does not follow commands  Skin: no rash, no erythema, no phlebitis  Heme/Onc: no lymphadenopathy   Psychiatric: unable to assess due to poor mental status                                10.3   17.30 )-----------( 236      ( 12 May 2020 06:52 )             34.0       05-12    141  |  102  |  54<H>  ----------------------------<  217<H>  3.3<L>   |  18<L>  |  2.38<H>    Ca    9.2      12 May 2020 06:54  Phos  4.7     05-12  Mg     2.3     05-12    TPro  6.5  /  Alb  2.6<L>  /  TBili  0.3  /  DBili  0.1  /  AST  17  /  ALT  13  /  AlkPhos  358<H>  05-12      D-Dimer Assay, Quantitative (05.11.20 @ 14:07)    D-Dimer Assay, Quantitative: 925: D-Dimer result less than 230 ng/mL DDU correlates with the absence  of thrombosis in a patient with a low and moderate       pre-test probability of thrombosis.  1 DDU is approximately equal to  2 ng/mL FEU (previous units). ng/mL DDU        MICROBIOLOGY:    Culture - Blood (05.08.20 @ 22:14)    Specimen Source: .Blood Blood-Venous    Culture Results:   No growth to date.      Culture - Blood (05.08.20 @ 22:14)    Specimen Source: .Blood Blood-Peripheral    Culture Results:   No growth to date.      Culture - Urine (05.08.20 @ 15:09)    Specimen Source: .Urine Clean Catch (Midstream)    Culture Results:   No growth      Urinalysis + Microscopic Examination (05.08.20 @ 12:57)    Blood, Urine: Negative    Glucose Qualitative, Urine: Negative    Bilirubin: Negative    Color: Yellow    Urine Appearance: Slightly Turbid    Specific Gravity: 1.016    Urobilinogen: Negative    pH Urine: 6.0    Leukocyte Esterase Concentration: Negative    Ketone - Urine: Negative    Nitrite: Negative    Protein, Urine: 30 mg/dL    Red Blood Cell - Urine: 0 /hpf    White Blood Cell - Urine: 2 /HPF    Epithelial Cells: 0    Hyaline Casts: 0 /LPF    Bacteria: Negative      Vancomycin Level, Random: 8.7 (05-09 @ 06:50)        RADIOLOGY:      EXAM:  XR CHEST PORTABLE URGENT 1V                            PROCEDURE DATE:  05/12/2020        INTERPRETATION:  A single chest x-ray was obtained on 8/12/2020.    Indication: Hypoxia.    Impression:    The heart is normal in size. Bibasilar platelike atelectasis. Status post sternotomy. No pneumothorax.        KAYLA CARRERO M.D., ATTENDING RADIOLOGIST  This document has been electronically signed. May 12 2020  8:40AM          EXAM:  DUPLEX SCAN EXT VEINS LOWER BI                            PROCEDURE DATE:  05/11/2020            INTERPRETATION:  CLINICAL INFORMATION: Swelling    COMPARISON: None available.    TECHNIQUE: Duplex sonography of the BILATERAL LOWER extremity veins with color and spectral Doppler, with and without compression.      FINDINGS:    There is normal compressibility of the bilateral common femoral, femoral and popliteal veins.     Doppler examination shows normal spontaneous and phasic flow.    No calf vein thrombosis is detected.    IMPRESSION:     No evidence of deep venous thrombosis in either lower extremity.    JASMINA OSORIO M.D., ATTENDING RADIOLOGIST  This document has been electronically signed. May 11 2020  9:32PM             EXAM:  CT BRAIN                            PROCEDURE DATE:  05/11/2020            INTERPRETATION:  History: Confusion. Agitation. Lower extremity weakness. Altered mental status.    Description: A noncontrast head CT was performed. Axial images were performed from the skull base to the vertex with coronal/sagittal reconstructions.    Comparison is made to head CT study from 05/16/2016.    There is no evidence for acute infarct, acute hemorrhage, mass effect, calvarial fracture, or hydrocephalus. A chronic left thalamic lacunar infarct is unchanged.    Moderate patchy hypodensity without mass effect is noted in the periventricular white matter which most likely represents chronic microvascular ischemic changes given the patient's age.    Cerebral volume loss is present with secondary proportional prominence of the sulci and ventricles.    A chronic left medial orbital wall fracture is unchanged. The paranasal sinuses demonstrate mild mucosal thickening.    The mastoid air cells and middle ear cavities are clear.    No lytic or blastic destructive calvarial lesions are noted.    IMPRESSION:    No acute intracranial pathology is noted. If the patient has new and persistent symptoms, consider short interval follow-up head CT or brain MRI follow-up if there are no MRI contraindications.        MALA ARAUJO M.D., ATTENDING RADIOLOGIST  This document has been electronically signed. May 11 2020  8:52AM          imaging below personally reviewed  [X] YES Nicolas Drake MD, PhD | PGY-2  Department of Internal Medicine  Pager [phone] ([facility]) / [phone] ([facility])      Patient is a 74y old  Male who presents with a chief complaint of Weakness (12 May 2020 07:55)      HPI:  74-yo M w/ PMH of CAD s/p CABG, prostate CA on chemo, T2DM, HTN, and HLD, p/w bilateral lower extremity weakness. Several days of leg weakness and fall, no LOC or trauma. No fever, dizziness, chest pain, SOB, or palpitations on presentation per H&P. Patient also presented with leukocytosis at 18, and was started on Zosyn. Given vanco x1, however discontinued after 1 dose. BCx and UCx from 5/8 NGTD. COVID neg x2 (5/8 and 5/11). Afebrile since admission. Leukocytosis remained at 17-18s despite antibiotics. D-dimer 925.  this admission (200s in the past per verbal communication with primary team, unclear how long ago). Incompletely performed MRI of L&T spine showed no compression. ID was consulted for leukocytosis.    Patient was seen and examined at bedside. Patient was alert and awake, however appeared confused, making stereotypic R hand movement, unable to carry on a meaningful conversation. Was able to some questions in yes or no. Endorsed SOB. No chest pain, headache, fever, or dizziness.    prior hospital charts reviewed [X]  primary team notes reviewed [X]  other consultant notes reviewed [X]    PAST MEDICAL & SURGICAL HISTORY:  HTN (hypertension)  Prostate cancer  Hyperlipidemia  Neuropathy  Diabetes mellitus, type 2  CAD (coronary artery disease)  History of cholecystectomy  H/O coronary artery bypass surgery      Allergies  No Known Allergies        ANTIMICROBIALS:  piperacillin/tazobactam IVPB.. 3.375 every 8 hours      OTHER MEDS: MEDICATIONS  (STANDING):  amLODIPine   Tablet 5 daily  dextrose 40% Gel 15 once PRN  dextrose 50% Injectable 12.5 once  dextrose 50% Injectable 25 once  dextrose 50% Injectable 25 once  furosemide    Tablet 20 daily  glucagon  Injectable 1 once PRN  haloperidol    Injectable 1 every 6 hours PRN  heparin   Injectable 5000 every 8 hours  insulin lispro (HumaLOG) corrective regimen sliding scale  three times a day before meals  insulin lispro (HumaLOG) corrective regimen sliding scale  at bedtime  polyethylene glycol 3350 17 two times a day  senna 2 at bedtime      SOCIAL HISTORY:   hx smoking  non-smoker    FAMILY HISTORY:  No pertinent family history in first degree relatives      REVIEW OF SYSTEMS  [  ] ROS unobtainable because:    [X] All other systems negative except as noted below:	    Constitutional:  [ ] fever [ ] chills  [ ] weight loss  [ ] weakness  Skin:  [ ] rash [ ] phlebitis	  Eyes: [ ] icterus [ ] pain  [ ] discharge	  ENMT: [ ] sore throat  [ ] thrush [ ] ulcers [ ] exudates  Respiratory: [X] dyspnea [ ] hemoptysis [ ] cough [ ] sputum	  Cardiovascular:  [ ] chest pain [ ] palpitations [ ] edema	  Gastrointestinal:  [ ] nausea [ ] vomiting [ ] diarrhea [ ] constipation [ ] pain	  Genitourinary:  [ ] dysuria [ ] frequency [ ] hematuria [ ] discharge [ ] flank pain  [ ] incontinence  Musculoskeletal:  [ ] myalgias [ ] arthralgias [ ] arthritis  [ ] back pain  Neurological:  [ ] headache [ ] seizures  [ ] confusion/altered mental status  Psychiatric:  [ ] anxiety [ ] depression	  Hematology/Lymphatics:  [ ] lymphadenopathy  Endocrine:  [ ] adrenal [ ] thyroid  Allergic/Immunologic:	 [ ] transplant [ ] seasonal    Vital Signs Last 24 Hrs  T(F): 99.3 (05-12-20 @ 04:49), Max: 99.3 (05-12-20 @ 04:49)    Vital Signs Last 24 Hrs  HR: 108 (05-12-20 @ 04:49) (92 - 108)  BP: 137/66 (05-12-20 @ 04:49) (129/66 - 157/71)  RR: 22 (05-12-20 @ 04:49)  SpO2: 90% (05-12-20 @ 04:49) (90% - 95%)  Wt(kg): --    PHYSICAL EXAM:  Constitutional: appears confused and uncomfortable  HEAD/EYES: anicteric, no conjunctival injection  ENT: supple, no thrush  Cardiovascular: normal S1, S2, no murmur, no edema  Respiratory: no crackles bilaterally; Good R lung field movement, diminished L field breath sounds  GI: soft, non-tender, normal bowel sounds  : +mccann, no CVA tenderness  Musculoskeletal: no synovitis, normal ROM  Neurologic: A&O x1 (self), does not follow commands  Skin: no rash, no erythema, no phlebitis  Heme/Onc: no lymphadenopathy   Psychiatric: unable to assess due to poor mental status                                10.3   17.30 )-----------( 236      ( 12 May 2020 06:52 )             34.0       05-12    141  |  102  |  54<H>  ----------------------------<  217<H>  3.3<L>   |  18<L>  |  2.38<H>    Ca    9.2      12 May 2020 06:54  Phos  4.7     05-12  Mg     2.3     05-12    TPro  6.5  /  Alb  2.6<L>  /  TBili  0.3  /  DBili  0.1  /  AST  17  /  ALT  13  /  AlkPhos  358<H>  05-12      D-Dimer Assay, Quantitative (05.11.20 @ 14:07)    D-Dimer Assay, Quantitative: 925: D-Dimer result less than 230 ng/mL DDU correlates with the absence  of thrombosis in a patient with a low and moderate       pre-test probability of thrombosis.  1 DDU is approximately equal to  2 ng/mL FEU (previous units). ng/mL DDU        MICROBIOLOGY:    Culture - Blood (05.08.20 @ 22:14)    Specimen Source: .Blood Blood-Venous    Culture Results:   No growth to date.      Culture - Blood (05.08.20 @ 22:14)    Specimen Source: .Blood Blood-Peripheral    Culture Results:   No growth to date.      Culture - Urine (05.08.20 @ 15:09)    Specimen Source: .Urine Clean Catch (Midstream)    Culture Results:   No growth      Urinalysis + Microscopic Examination (05.08.20 @ 12:57)    Blood, Urine: Negative    Glucose Qualitative, Urine: Negative    Bilirubin: Negative    Color: Yellow    Urine Appearance: Slightly Turbid    Specific Gravity: 1.016    Urobilinogen: Negative    pH Urine: 6.0    Leukocyte Esterase Concentration: Negative    Ketone - Urine: Negative    Nitrite: Negative    Protein, Urine: 30 mg/dL    Red Blood Cell - Urine: 0 /hpf    White Blood Cell - Urine: 2 /HPF    Epithelial Cells: 0    Hyaline Casts: 0 /LPF    Bacteria: Negative      Vancomycin Level, Random: 8.7 (05-09 @ 06:50)        RADIOLOGY:      EXAM:  XR CHEST PORTABLE URGENT 1V                            PROCEDURE DATE:  05/12/2020        INTERPRETATION:  A single chest x-ray was obtained on 8/12/2020.    Indication: Hypoxia.    Impression:    The heart is normal in size. Bibasilar platelike atelectasis. Status post sternotomy. No pneumothorax.        KAYLA CARRERO M.D., ATTENDING RADIOLOGIST  This document has been electronically signed. May 12 2020  8:40AM          EXAM:  DUPLEX SCAN EXT VEINS LOWER BI                            PROCEDURE DATE:  05/11/2020            INTERPRETATION:  CLINICAL INFORMATION: Swelling    COMPARISON: None available.    TECHNIQUE: Duplex sonography of the BILATERAL LOWER extremity veins with color and spectral Doppler, with and without compression.      FINDINGS:    There is normal compressibility of the bilateral common femoral, femoral and popliteal veins.     Doppler examination shows normal spontaneous and phasic flow.    No calf vein thrombosis is detected.    IMPRESSION:     No evidence of deep venous thrombosis in either lower extremity.    JASMINA OSORIO M.D., ATTENDING RADIOLOGIST  This document has been electronically signed. May 11 2020  9:32PM             EXAM:  CT BRAIN                            PROCEDURE DATE:  05/11/2020            INTERPRETATION:  History: Confusion. Agitation. Lower extremity weakness. Altered mental status.    Description: A noncontrast head CT was performed. Axial images were performed from the skull base to the vertex with coronal/sagittal reconstructions.    Comparison is made to head CT study from 05/16/2016.    There is no evidence for acute infarct, acute hemorrhage, mass effect, calvarial fracture, or hydrocephalus. A chronic left thalamic lacunar infarct is unchanged.    Moderate patchy hypodensity without mass effect is noted in the periventricular white matter which most likely represents chronic microvascular ischemic changes given the patient's age.    Cerebral volume loss is present with secondary proportional prominence of the sulci and ventricles.    A chronic left medial orbital wall fracture is unchanged. The paranasal sinuses demonstrate mild mucosal thickening.    The mastoid air cells and middle ear cavities are clear.    No lytic or blastic destructive calvarial lesions are noted.    IMPRESSION:    No acute intracranial pathology is noted. If the patient has new and persistent symptoms, consider short interval follow-up head CT or brain MRI follow-up if there are no MRI contraindications.        MALA ARAUJO M.D., ATTENDING RADIOLOGIST  This document has been electronically signed. May 11 2020  8:52AM          imaging below personally reviewed  [X] YES

## 2020-05-12 NOTE — PROGRESS NOTE ADULT - ASSESSMENT
74 year old male with a PMH of CAD s/p CABG, T2DM, HTN, HLD, Prostate Cancer on chemo presenting with bilateral lower extremity weakness concerning for cord compression, found to have metastatic disease. Now with hypoxia requiring 6L NC, COVID PCR negative x2.

## 2020-05-13 NOTE — PROGRESS NOTE ADULT - PROBLEM SELECTOR PLAN 8
In the past on Metformin and Glipizide.   - C/w low ISS AC/QHS  - Goal fingersticks 120 - 180  - A1c 8.4

## 2020-05-13 NOTE — PROGRESS NOTE ADULT - PROBLEM SELECTOR PLAN 10
DVT PPx: Heparin drip   Diet: DASH/TLC, Consistent Carb  Dispo: pending patient stability and PT recs

## 2020-05-13 NOTE — PROGRESS NOTE ADULT - PROBLEM SELECTOR PLAN 2
Presented with weakness in his lower extremities. Has urinary retention and no bowel incontinence. ANDREE by neurosurgery has decreased rectal tone.   - Has had multiple imaging modalities MRI of the T/L that is incomplete due to claustrophobia seems like by imaging no signs of cord compression but recommending MRI of the pelvis  - Will attempt MRI again with anesthesia as patient has not been able to tolerate last 2 attempts   - Neuro checks Q4H  -[facility] does not recommend steroids at this time.  -Get MRI brain when able.

## 2020-05-13 NOTE — PROGRESS NOTE ADULT - PROBLEM SELECTOR PLAN 1
- Patient on 6L NC sating 90-95%, s/p 20 mg IV lasix overnight (received a total 3 doses)  - patient with tachycardiac to 100s, hx of prostate cancer with mets; concern for PE  - started on heparin drip for full AC and Bipap for hypoxia  - ABG done showing mild respiratory acidosis  - would like CTA, however patient with significant MARJORIE; V/Q scan not being done during COVID pandemic; will treat empirically for PE as patient hemodynamically unstable

## 2020-05-13 NOTE — CONSULT NOTE ADULT - PROBLEM SELECTOR RECOMMENDATION 2
Admit for septic workup and ID evaluation,send blood and urine cx,serial lactate levels,monitor vitals closley,ivfs hydration,monitor urine output and renal profile,iv abx as per id cons f/u  blood and urine cx,serial lactate levels,monitor vitals closley,ivfs hydration,monitor urine output and renal profile,iv abx

## 2020-05-13 NOTE — PROGRESS NOTE ADULT - SUBJECTIVE AND OBJECTIVE BOX
Pulmonary Follow Up       minimally responsive on bipap    ROS: unable at this time minimally verbal      MEDICATIONS  (STANDING):  amLODIPine   Tablet 5 milliGRAM(s) Oral daily  dextrose 5%. 1000 milliLiter(s) (50 mL/Hr) IV Continuous <Continuous>  dextrose 50% Injectable 12.5 Gram(s) IV Push once  dextrose 50% Injectable 25 Gram(s) IV Push once  dextrose 50% Injectable 25 Gram(s) IV Push once  heparin  Infusion.  Unit(s)/Hr (19 mL/Hr) IV Continuous <Continuous>  insulin lispro (HumaLOG) corrective regimen sliding scale   SubCutaneous three times a day before meals  insulin lispro (HumaLOG) corrective regimen sliding scale   SubCutaneous at bedtime  polyethylene glycol 3350 17 Gram(s) Oral two times a day  senna 2 Tablet(s) Oral at bedtime  sodium chloride 0.9%. 1000 milliLiter(s) (75 mL/Hr) IV Continuous <Continuous>    MEDICATIONS  (PRN):  dextrose 40% Gel 15 Gram(s) Oral once PRN Blood Glucose LESS THAN 70 milliGRAM(s)/deciliter  glucagon  Injectable 1 milliGRAM(s) IntraMuscular once PRN Glucose LESS THAN 70 milligrams/deciliter          LABS:                                   11.0   16.67 )-----------( 254      ( 13 May 2020 04:20 )             35.5   05-12    141  |  102  |  54<H>  ----------------------------<  217<H>  3.3<L>   |  18<L>  |  2.38<H>    Ca    9.2      12 May 2020 06:54  Phos  4.7     05-12  Mg     2.3     05-12    TPro  6.5  /  Alb  2.6<L>  /  TBili  0.3  /  DBili  0.1  /  AST  17  /  ALT  13  /  AlkPhos  358<H>  05-12      ABG - ( 13 May 2020 11:19 )  pH, Arterial: 7.25  pH, Blood: x     /  pCO2: 57    /  pO2: 81    / HCO3: 24    / Base Excess: -3.3  /  SaO2: 96                  < from: Xray Chest 1 View- PORTABLE-Urgent (05.12.20 @ 03:34) >  EXAM:  XR CHEST PORTABLE URGENT 1V                            PROCEDURE DATE:  05/12/2020            INTERPRETATION:  A single chest x-ray was obtained on 8/12/2020.    Indication: Hypoxia.    Impression:    The heart is normal in size. Bibasilar platelike atelectasis. Status post sternotomy. No pneumothorax.                    KAYLA CARRERO M.D., ATTENDING RADIOLOGIST  This document has been electronically signed. May 12 2020  8:40AM          < end of copied text >  < from: CT Head No Cont (05.11.20 @ 08:37) >  EXAM:  CT BRAIN                            PROCEDURE DATE:  05/11/2020        INTERPRETATION:  History: Confusion. Agitation. Lower extremity weakness. Altered mental status.        Comparison is made to head CT study from 05/16/2016.    There is no evidence for acute infarct, acute hemorrhage, mass effect, calvarial fracture, or hydrocephalus. A chronic left thalamic lacunar infarct is unchanged.    Moderate patchy hypodensity without mass effect is noted in the periventricular white matter which most likely represents chronic microvascular ischemic changes given the patient's age.    Cerebral volume loss is present with secondary proportional prominence of the sulci and ventricles.    A chronic left medial orbital wall fracture is unchanged. The paranasal sinuses demonstrate mild mucosal thickening.    The mastoid air cells and middle ear cavities are clear.    No lytic or blastic destructive calvarial lesions are noted.    IMPRESSION:    No acute intracranial pathology is noted. If the patient has new and persistent symptoms, consider short interval follow-up head CT or brain MRI follow-up if there are no MRI contraindications.                    MALA ARAUJO M.D., ATTENDING RADIOLOGIST  This document has been electronically signed. May 11 2020  8:52AM    < end of copied text >    exam     Vital Signs Last 24 Hrs  T(C): 37.4 (12 May 2020 04:49), Max: 37.4 (12 May 2020 04:49)  T(F): 99.3 (12 May 2020 04:49), Max: 99.3 (12 May 2020 04:49)  HR: 108 (12 May 2020 04:49) (92 - 108)  BP: 137/66 (12 May 2020 04:49) (129/66 - 157/71)  BP(mean): --  RR: 22 (12 May 2020 04:49) (18 - 22)  SpO2: 90% (12 May 2020 04:49) (90% - 95%)Description: A noncontrast head CT was performed. Axial images were performed from the skull base to the vertex with coronal/sagittal reconstructions.      Gen: In NAD   head: NC/AT   eyes: opens on command   Neck: Supple no JVD no Bruit   Lungs: CTA bi lat no rhonchi, crackles, wheezing no resp distress   Heart: S1> S2 tachy   Abdomen: Soft N/T N/D normal BS   ext: edema bi lat   Neuro: speech single words on command can move arms and legs on command toes upward with babinski    Abdomen: Soft N/T N/D     medical problems     HTN (hypertension)  Prostate cancer metastatic to bone (spine) undergoing chemo   Hyperlipidemia  Neuropathy  Diabetes mellitus, type 2  CAD (coronary artery disease)  CKD/MARJORIE              REC   - cont heparin drip for likely PE, obtain VQ scan if possible avoid cta given renal dysfunction  -increase bipap to 15/5 given CO2 retention, repeat ABG one hour later  -GOC should be addressed in light of metastatic disease and impending respiratory failure    Izabel Willingham MD  [phone]

## 2020-05-13 NOTE — CONSULT NOTE ADULT - ATTENDING COMMENTS
45 minutes of critical care management as follows:    Severe progression of metastatic disease.  weakness in lower extremities and on my exam weak in trunk / upper chest - central obesity  A bedside critical care echocardiography was done at bed side.  Good visualization of bilateral ventricles and there is no right ventricular strain pattern.  Previous echo showed pulmonary hypertension which would not be unexpected given assumed ongoing sleep apnea.    Acute hypoxemic respiratory failure with initial high PEEP and FIO2 - but able to titrate down significantly    MARJORIE with creatinine to 3.7, oliguric.  Lactate 1.   good ventricular filling noted on bedside echocardiography    Moderate dose pressor for hypotension but able to come down to low dose with minimal intervention - doubt sepsis pattern

## 2020-05-13 NOTE — PROGRESS NOTE ADULT - PROBLEM SELECTOR PLAN 9
Had low SBPs in the 100s on admission. Reportedly on antihypertensives at home.  - c/w lasix and amlodipine  - will continue to monitor

## 2020-05-13 NOTE — CONSULT NOTE ADULT - PROBLEM SELECTOR RECOMMENDATION 9
CHRONIC KIDNEY DISEASE, STAGE 1:   Serum creatinine is stable at ***, approximating a GFR of *** ml/min.   There is no progression.  No uremic symptoms. No evidence of  worsening  Anemia. Fluid status stable.   Will continue to avoid nephrotoxic drugs.  Patient remains asymptomatic.  Continue current therapy. CHRONIC KIDNEY DISEASE, STAGE 3: and ACUTE RENAL FAILURE:   Serum creatinine is  at   3.17  , approximating GFR at decreased  ml/min.   There is  progression . No uremic symptoms   evidence of anemia .  Fluid status stable.  Will continue to avoid nephrotoxic drugs.  Patient remains asymptomatic.   Continue current therapy.  hold  diuretic.  hold   ACE inhibitor.  hold   ARB.  Additional evaluation:   ECG,    echocardiogram,     CXR,  will obtained recent   renal ultrasound to evalaute kidney size and possible stones ,  continue wit iv fluid

## 2020-05-13 NOTE — CONSULT NOTE ADULT - SUBJECTIVE AND OBJECTIVE BOX
Patient is a 74y Male whom presented to the hospital with     PAST MEDICAL & SURGICAL HISTORY:  HTN (hypertension)  Prostate cancer  Hyperlipidemia  Neuropathy  Diabetes mellitus, type 2  CAD (coronary artery disease)  History of cholecystectomy  H/O coronary artery bypass surgery      MEDICATIONS  (STANDING):  calcium gluconate IVPB 1 Gram(s) IV Intermittent once  chlorhexidine 0.12% Liquid 15 milliLiter(s) Oral Mucosa every 12 hours  chlorhexidine 4% Liquid 1 Application(s) Topical <User Schedule>  chlorhexidine 4% Liquid 1 Application(s) Topical <User Schedule>  dexMEDEtomidine Infusion 0.1 MICROgram(s)/kG/Hr (2.67 mL/Hr) IV Continuous <Continuous>  dextrose 50% Injectable 50 milliLiter(s) IV Push once  heparin  Infusion.  Unit(s)/Hr (19 mL/Hr) IV Continuous <Continuous>  insulin lispro (HumaLOG) corrective regimen sliding scale   SubCutaneous every 4 hours  insulin regular  human recombinant. 10 Unit(s) IV Push once  lactated ringers. 1000 milliLiter(s) (100 mL/Hr) IV Continuous <Continuous>  norepinephrine Infusion 0.05 MICROgram(s)/kG/Min (9.99 mL/Hr) IV Continuous <Continuous>  pantoprazole  Injectable 40 milliGRAM(s) IV Push daily  piperacillin/tazobactam IVPB.. 3.375 Gram(s) IV Intermittent every 8 hours  vasopressin Infusion 0.03 Unit(s)/Min (1.8 mL/Hr) IV Continuous <Continuous>      Allergies    No Known Allergies    Intolerances        SOCIAL HISTORY:  Denies ETOh,Smoking,     FAMILY HISTORY:  No pertinent family history in first degree relatives      REVIEW OF SYSTEMS:    CONSTITUTIONAL: No weakness, fevers or chills  EYES/ENT: No visual changes;  no throat pain   NECK: No pain or stiffness  RESPIRATORY: No cough, wheezing, hemoptysis; No shortness of breath  CARDIOVASCULAR: No chest pain or palpitations  GASTROINTESTINAL: No abdominal or epigastric pain. No nausea, vomiting,     No diarrhea or constipation. No melena   GENITOURINARY: No dysuria, frequency or hematuria  NEUROLOGICAL: No numbness or weakness  SKIN: dry      VITAL:  T(C): , Max: 37.3 (20 @ 16:30)  T(F): , Max: 99.2 (20 @ 16:30)  HR: 72 (20 @ 21:15)  BP: 133/53 (20 @ 20:15)  BP(mean): 77 (20 @ 20:15)  RR: 30 (20 @ 21:15)  SpO2: 100% (20 @ 21:15)  Wt(kg): --    I and O's:     @ 07:01  -   @ 07:00  --------------------------------------------------------  IN: 0 mL / OUT: 750 mL / NET: -750 mL     @ 07:01  -   @ 21:38  --------------------------------------------------------  IN: 1008 mL / OUT: 115 mL / NET: 893 mL          PHYSICAL EXAM:    Constitutional: NAD  HEENT: conjunctive   clear   Neck:  No JVD  Respiratory: CTAB  Cardiovascular: S1 and S2  Gastrointestinal: BS+, soft, NT/ND  Extremities: No peripheral edema  Neurological: A/O x 3, no focal deficits  Psychiatric: Normal mood, normal affect  : No Fuller  Skin: No rashes  Access: Not applicable    LABS:                        10.1   19.93 )-----------( 296      ( 13 May 2020 16:05 )             33.6     -    144  |  105  |  80<H>  ----------------------------<  218<H>  3.7   |  22  |  3.72<H>    Ca    8.7      13 May 2020 15:50  Phos  4.7     05-  Mg     2.3     -    TPro  6.6  /  Alb  2.3<L>  /  TBili  0.2  /  DBili  x   /  AST  12  /  ALT  11  /  AlkPhos  296<H>  05-13      Urine Studies:  Urinalysis Basic - ( 13 May 2020 18:19 )    Color: Light Yellow / Appearance: Slightly Turbid / S.018 / pH: x  Gluc: x / Ketone: Trace  / Bili: Small / Urobili: Negative   Blood: x / Protein: 30 mg/dL / Nitrite: Negative   Leuk Esterase: Moderate / RBC: >50 /hpf / WBC 7 /HPF   Sq Epi: x / Non Sq Epi: 3 / Bacteria: Few      Creatinine, Random Urine: 204 mg/dL ( @ 19:49)  Sodium, Random Urine: <35 mmol/L ( @ 19:49)  Osmolality, Random Urine: 338 mosm/Kg ( @ 14:41)  Creatinine, Random Urine: 186 mg/dL ( @ 11:50)        RADIOLOGY & ADDITIONAL STUDIES: Patient is a 74y Male whom presented to the hospital with ckd and zehra     PAST MEDICAL & SURGICAL HISTORY:  HTN (hypertension)  Prostate cancer  Hyperlipidemia  Neuropathy  Diabetes mellitus, type 2  CAD (coronary artery disease)  History of cholecystectomy  H/O coronary artery bypass surgery      MEDICATIONS  (STANDING):  calcium gluconate IVPB 1 Gram(s) IV Intermittent once  chlorhexidine 0.12% Liquid 15 milliLiter(s) Oral Mucosa every 12 hours  chlorhexidine 4% Liquid 1 Application(s) Topical <User Schedule>  chlorhexidine 4% Liquid 1 Application(s) Topical <User Schedule>  dexMEDEtomidine Infusion 0.1 MICROgram(s)/kG/Hr (2.67 mL/Hr) IV Continuous <Continuous>  dextrose 50% Injectable 50 milliLiter(s) IV Push once  heparin  Infusion.  Unit(s)/Hr (19 mL/Hr) IV Continuous <Continuous>  insulin lispro (HumaLOG) corrective regimen sliding scale   SubCutaneous every 4 hours  insulin regular  human recombinant. 10 Unit(s) IV Push once  lactated ringers. 1000 milliLiter(s) (100 mL/Hr) IV Continuous <Continuous>  norepinephrine Infusion 0.05 MICROgram(s)/kG/Min (9.99 mL/Hr) IV Continuous <Continuous>  pantoprazole  Injectable 40 milliGRAM(s) IV Push daily  piperacillin/tazobactam IVPB.. 3.375 Gram(s) IV Intermittent every 8 hours  vasopressin Infusion 0.03 Unit(s)/Min (1.8 mL/Hr) IV Continuous <Continuous>      Allergies    No Known Allergies    Intolerances        SOCIAL HISTORY:  Denies ETOh,Smoking,     FAMILY HISTORY:  No pertinent family history in first degree relatives      REVIEW OF SYSTEMS:    unable to obtained a good review system        VITAL:  T(C): , Max: 37.3 (20 @ 16:30)  T(F): , Max: 99.2 (20 @ 16:30)  HR: 72 (20 @ 21:15)  BP: 133/53 (20 @ 20:15)  BP(mean): 77 (20 @ 20:15)  RR: 30 (20 @ 21:15)  SpO2: 100% (20 @ 21:15)  Wt(kg): --    I and O's:     @ 07:01  -   @ 07:00  --------------------------------------------------------  IN: 0 mL / OUT: 750 mL / NET: -750 mL     @ 07:01  -   @ 21:38  --------------------------------------------------------  IN: 1008 mL / OUT: 115 mL / NET: 893 mL          PHYSICAL EXAM:    Constitutional: resp stress   HEENT: conjunctive   clear   Neck:  No JVD  Respiratory: decrease bs b/l   Cardiovascular: S1 and S2  Gastrointestinal: BS+, soft, NT/ND  Extremities: No peripheral edema  Skin: dry   Access: Not applicable    LABS:                        10.1   19.93 )-----------( 296      ( 13 May 2020 16:05 )             33.6         144  |  105  |  80<H>  ----------------------------<  218<H>  3.7   |  22  |  3.72<H>    Ca    8.7      13 May 2020 15:50  Phos  4.7       Mg     2.3         TPro  6.6  /  Alb  2.3<L>  /  TBili  0.2  /  DBili  x   /  AST  12  /  ALT  11  /  AlkPhos  296<H>        Urine Studies:  Urinalysis Basic - ( 13 May 2020 18:19 )    Color: Light Yellow / Appearance: Slightly Turbid / S.018 / pH: x  Gluc: x / Ketone: Trace  / Bili: Small / Urobili: Negative   Blood: x / Protein: 30 mg/dL / Nitrite: Negative   Leuk Esterase: Moderate / RBC: >50 /hpf / WBC 7 /HPF   Sq Epi: x / Non Sq Epi: 3 / Bacteria: Few      Creatinine, Random Urine: 204 mg/dL ( @ 19:49)  Sodium, Random Urine: <35 mmol/L ( @ 19:49)  Osmolality, Random Urine: 338 mosm/Kg ( @ 14:41)  Creatinine, Random Urine: 186 mg/dL ( @ 11:50)        RADIOLOGY & ADDITIONAL STUDIES:

## 2020-05-13 NOTE — PROCEDURE NOTE - NSPROCDETAILS_GEN_ALL_CORE
guidewire recovered/sterile dressing applied/sterile technique, catheter placed/ultrasound guidance/lumen(s) aspirated and flushed
Seldinger technique/all materials/supplies accounted for at end of procedure/location identified, draped/prepped, sterile technique used, needle inserted/introduced/connected to a pressurized flush line/ultrasound guidance/positive blood return obtained via catheter/sutured in place/hemostasis with direct pressure, dressing applied

## 2020-05-13 NOTE — PROGRESS NOTE ADULT - ATTENDING COMMENTS
Ran Tong  Attending Physician   Division of Infectious Disease  Pager #[phone]  After 5pm/weekend or no response, call #[phone]    I am away on 5/14 and will return 5/15. ID to cover #[phone].

## 2020-05-13 NOTE — CONSULT NOTE ADULT - SUBJECTIVE AND OBJECTIVE BOX
SICU Consultation Note  =====================================================  HPI:  Mr. Hicks is a 74 year old male with a PMH of CAD s/p CABG, T2DM, HTN, HLD, Prostate Cancer on chemo presenting with bilateral lower extremity weakness. Patient reports that a couple days had an episode where he got up from the chair and due to weakness in his thighs fell to the ground forward. Denies LOC or head trauma. No dizziness, LH, chest pain, shortness of breath or palpitations before. Was on the ground for about an hour and had son-in-law help him up as he felt too weak. Denies back pain or numbness/tingling in his legs. Has been having progressive weakness for the past few weeks. Seems like Neurosurgery spoke to wife and has had progressive weakness for more than a year. Has had no fevers/chills, cough, abdominal pain, nausea, vomiting or diarrhea. Has not been urinating the past few days. No bowel incontinence. Denies saddle anesthesia. His [facility], and takes oral chemotherapy.     In the ED:  Vitals: 98.6F /69 HR 75 RR 16   Labs: WBC 18.89 Hgb 10.6 BUN 51 SCr 2.19   COVID x 1 neg  Consults: Neurosurgery  Interventions: Vanc 1g x 1, Zosyn x 1, NS Bolus 500cc x 2, KCl 40 mEq x 1 (08 May 2020 21:48)      On the floor, the patient was awaiting an MRI with anesthesia due for work up on lower extremity weakness when he began to have altered mental status, was confused, hypoxic and in respiratory distress. He was placed on bipap with some improvement in his oxygenation. D-dimer was elevated at 925 and the patient was presumed to have a pulmonary embolism. A CTA was not done due to patient's elevated creatinine level. He was started on a heparin drip. The following day, a rapid response was called due to tachypnea and worsening tachypnea. He was intubated and brought to the ICU. In the ICU, the patient was hypotensive requiring starting vasopressers. An arterial line and a central line were placed for hemodynamic monitoring and resuscitation.       PAST MEDICAL & SURGICAL HISTORY:  HTN (hypertension)  Prostate cancer  Hyperlipidemia  Neuropathy  Diabetes mellitus, type 2  CAD (coronary artery disease)  History of cholecystectomy  H/O coronary artery bypass surgery    Home Meds: Home Medications:  amLODIPine 5 mg oral tablet: 1 tab(s) orally once a day (14 Jul 2017 14:47)  aspirin 81 mg oral delayed release tablet: 1 tab(s) orally once a day (14 Jul 2017 14:47)  atorvastatin 40 mg oral tablet: 1 tab(s) orally once a day (at bedtime) (14 Jul 2017 14:47)  carvedilol 12.5 mg oral tablet: 1 tab(s) orally every 12 hours (14 Jul 2017 14:47)  furosemide 20 mg oral tablet: 1 tab(s) orally once a day (14 Jul 2017 14:47)  GlipiZIDE XL 5 mg oral tablet, extended release: 1 tab(s) orally once a day (09 Jul 2017 09:55)  levoFLOXacin 750 mg oral tablet: 1 tab(s) orally every 24 hours, Continue for 1 more dose.  (14 Jul 2017 14:47)  losartan 100 mg oral tablet: 1 tab(s) orally once a day (14 Jul 2017 14:47)  metformin 500 mg oral tablet: 1 tab(s) orally 2 times a day (09 Jul 2017 09:55)  Neurontin 300 mg oral capsule: 1 cap(s) orally once a day (09 Jul 2017 09:55)  nortriptyline 25 mg oral capsule: 1 cap(s) orally once a day (14 Jul 2017 14:47)    Allergies: No known allergies        Soc:   Advanced Directives: Full Code     ROS:    REVIEW OF SYSTEMS    [ ] A ten-point review of systems was otherwise negative except as noted.  [X] Due to altered mental status/intubation, subjective information were not able to be obtained from the patient. History was obtained, to the extent possible, from review of the chart and collateral sources of information.      CURRENT MEDICATIONS:   --------------------------------------------------------------------------------------  Neurologic Medications  dexMEDEtomidine Infusion 0.1 MICROgram(s)/kG/Hr IV Continuous <Continuous>    Respiratory Medications    Cardiovascular Medications  norepinephrine Infusion 0.05 MICROgram(s)/kG/Min IV Continuous <Continuous>    Gastrointestinal Medications  polyethylene glycol 3350 17 Gram(s) Oral two times a day  senna 2 Tablet(s) Oral at bedtime  sodium chloride 0.9%. 1000 milliLiter(s) IV Continuous <Continuous>    Genitourinary Medications    Hematologic/Oncologic Medications  heparin  Infusion.  Unit(s)/Hr IV Continuous <Continuous>    Antimicrobial/Immunologic Medications  piperacillin/tazobactam IVPB. 3.375 Gram(s) IV Intermittent once  piperacillin/tazobactam IVPB.. 3.375 Gram(s) IV Intermittent every 8 hours    Endocrine/Metabolic Medications  insulin lispro (HumaLOG) corrective regimen sliding scale   SubCutaneous three times a day before meals  insulin lispro (HumaLOG) corrective regimen sliding scale   SubCutaneous at bedtime    Topical/Other Medications  chlorhexidine 0.12% Liquid 15 milliLiter(s) Oral Mucosa every 12 hours  chlorhexidine 4% Liquid 1 Application(s) Topical <User Schedule>    --------------------------------------------------------------------------------------    VITAL SIGNS, INS/OUTS (last 24 hours):  --------------------------------------------------------------------------------------  ICU Vital Signs Last 24 Hrs  T(C): 37.3 (13 May 2020 16:30), Max: 37.3 (13 May 2020 16:30)  T(F): 99.2 (13 May 2020 16:30), Max: 99.2 (13 May 2020 16:30)  HR: 95 (13 May 2020 17:00) (84 - 101)  BP: 111/46 (13 May 2020 17:00) (105/64 - 148/67)  BP(mean): 67 (13 May 2020 17:00) (67 - 90)  ABP: --  ABP(mean): --  RR: 16 (13 May 2020 17:00) (16 - 50)  SpO2: 88% (13 May 2020 17:00) (75% - 99%)    I&O's Summary    12 May 2020 07:01  -  13 May 2020 07:00  --------------------------------------------------------  IN: 0 mL / OUT: 750 mL / NET: -750 mL    13 May 2020 07:01  -  13 May 2020 17:42  --------------------------------------------------------  IN: 105 mL / OUT: 60 mL / NET: 45 mL      --------------------------------------------------------------------------------------    EXAM:  General/Neuro  Exam: Obtunded     Respiratory  Exam: Respiratory distress  [] Tracheostomy   [x] Intubated  Mechanical Ventilation: Mode: AC/ CMV (Assist Control/ Continuous Mandatory Ventilation)  RR (machine): 16  TV (machine): 450  FiO2: 80  PEEP: 8  ITime: 0.8  MAP: 13  PIP: 31      Cardiovascular  Exam: Tachycardiac, arrythmia   Cardiac Rhythm: PAC       GI  Exam: Abdomen soft, Non-tender, Non-distended.   Current Diet:  NPO      Tubes/Lines/Drains  ***  [x] Peripheral IV  [X] Central Venous Line     	[] R	[] L	[] IJ	[] Fem	[] SC        Type:	    Date Placed:   [X] Arterial Line		[] R	[] L	[] Fem	[] Rad	[] Ax	Date Placed:   [] PICC:         	[] Midline		[] Mediport           [X] Urinary Catheter		Date Placed:     Extremities  Exam: Extremities warm, pink, well-perfused.        Derm:  Exam: Good skin turgor, no skin breakdown.      :   Exam: Fuller catheter in place.     LABS  --------------------------------------------------------------------------------------  Labs:  CAPILLARY BLOOD GLUCOSE      POCT Blood Glucose.: 217 mg/dL (13 May 2020 15:32)  POCT Blood Glucose.: 192 mg/dL (13 May 2020 14:04)  POCT Blood Glucose.: 212 mg/dL (13 May 2020 10:40)  POCT Blood Glucose.: 187 mg/dL (12 May 2020 22:01)                          10.1   19.93 )-----------( 296      ( 13 May 2020 16:05 )             33.6       Auto Neutrophil %: 85.7 % (05-13-20 @ 16:05)  Auto Immature Granulocyte %: 0.8 % (05-13-20 @ 16:05)    05-13    144  |  105  |  80<H>  ----------------------------<  218<H>  3.7   |  22  |  3.72<H>      Calcium, Total Serum: 8.7 mg/dL (05-13-20 @ 15:50)      LFTs:             6.6  | 0.2  | 12       ------------------[296     ( 13 May 2020 15:50 )  2.3  | x    | 11          Lipase:x      Amylase:x         Blood Gas Arterial, Lactate: 0.9 mmol/L (05-13-20 @ 15:51)  Blood Gas Arterial, Lactate: 0.8 mmol/L (05-12-20 @ 14:55)    ABG - ( 13 May 2020 15:51 )  pH: 7.14  /  pCO2: 78    /  pO2: 47    / HCO3: 26    / Base Excess: -4.1  /  SaO2: 76              ABG - ( 13 May 2020 11:19 )  pH: 7.25  /  pCO2: 57    /  pO2: 81    / HCO3: 24    / Base Excess: -3.3  /  SaO2: 96              ABG - ( 12 May 2020 14:55 )  pH: 7.28  /  pCO2: 49    /  pO2: 71    / HCO3: 22    / Base Excess: -4.2  /  SaO2: 94                Coags:     x      ----< x       ( 13 May 2020 15:49 )     92.2        CARDIAC MARKERS ( 13 May 2020 04:20 )  x     / x     / 51 U/L / x     / 1.0 ng/mL      Serum Pro-Brain Natriuretic Peptide: 4371 pg/mL (05-11-20 @ 14:07)        --------------------------------------------------------------------------------------    OTHER LABS    IMAGING RESULTS

## 2020-05-13 NOTE — PROGRESS NOTE ADULT - SUBJECTIVE AND OBJECTIVE BOX
Called to patient's bedside for intubation.  Therapy initiated by primary medical team.    Patient Assessment:     Baseline Vital Signs:  BP: 141/62  HR: 90  RR:   SpO2: 93% bipap    Medications Administered: etomidate 10mg succinycholine 100mg    Intubation:      [ ] Direct Laryngoscopy    [ x] Video-Assisted Laryngoscopy   [ ] Fiberoptic Intubation    Blade: Mac #4 Glidescope  Cormack-Lehane View: [ ] 1     [ ] 2A     [ ] 2B     [ ] 3     [ ]  4  ETT Size:7.5  Marking at Teeth: 22cm    Post-Intubation Vital Signs:  BP:114/83  HR:90  RR: 14   SpO2: 95-96%    Positive end-tidal carbon dioxide via EasyCap, positive bilateral breath sounds.  CXR to be reviewed by primary team.  Atraumatic intubation with no complications.

## 2020-05-13 NOTE — PROGRESS NOTE ADULT - ASSESSMENT
74-yo M w/ PMH of CAD s/p CABG, prostate CA on chemo, T2DM, HTN, and HLD, p/w bilateral lower extremity weakness, consulted for persistent leukocytosis despite antibiotics.

## 2020-05-13 NOTE — PROGRESS NOTE ADULT - SUBJECTIVE AND OBJECTIVE BOX
Patient is a 74y old  Male who presents with a chief complaint of Weakness (12 May 2020 14:18)      SUBJECTIVE / OVERNIGHT EVENTS: Patient started on heparin drip and BiPAP for suspected PE. Patient transferred to tele floor for closer monitoring     MEDICATIONS  (STANDING):  amLODIPine   Tablet 5 milliGRAM(s) Oral daily  dextrose 5%. 1000 milliLiter(s) (50 mL/Hr) IV Continuous <Continuous>  dextrose 50% Injectable 12.5 Gram(s) IV Push once  dextrose 50% Injectable 25 Gram(s) IV Push once  dextrose 50% Injectable 25 Gram(s) IV Push once  furosemide    Tablet 20 milliGRAM(s) Oral daily  heparin  Infusion.  Unit(s)/Hr (19 mL/Hr) IV Continuous <Continuous>  insulin lispro (HumaLOG) corrective regimen sliding scale   SubCutaneous three times a day before meals  insulin lispro (HumaLOG) corrective regimen sliding scale   SubCutaneous at bedtime  polyethylene glycol 3350 17 Gram(s) Oral two times a day  senna 2 Tablet(s) Oral at bedtime    MEDICATIONS  (PRN):  dextrose 40% Gel 15 Gram(s) Oral once PRN Blood Glucose LESS THAN 70 milliGRAM(s)/deciliter  glucagon  Injectable 1 milliGRAM(s) IntraMuscular once PRN Glucose LESS THAN 70 milligrams/deciliter  haloperidol    Injectable 1 milliGRAM(s) IntraMuscular every 6 hours PRN Agitation      Vital Signs Last 24 Hrs  T(C): 37.1 (13 May 2020 05:54), Max: 37.2 (12 May 2020 17:19)  T(F): 98.7 (13 May 2020 05:54), Max: 99 (12 May 2020 17:19)  HR: 84 (13 May 2020 05:54) (84 - 97)  BP: 134/59 (13 May 2020 05:54) (112/55 - 148/67)  BP(mean): 84 (13 May 2020 05:54) (84 - 87)  RR: 20 (13 May 2020 05:54) (20 - 28)  SpO2: 96% (13 May 2020 05:54) (90% - 99%)  CAPILLARY BLOOD GLUCOSE      POCT Blood Glucose.: 187 mg/dL (12 May 2020 22:01)  POCT Blood Glucose.: 184 mg/dL (12 May 2020 17:04)  POCT Blood Glucose.: 197 mg/dL (12 May 2020 11:45)  POCT Blood Glucose.: 191 mg/dL (12 May 2020 08:29)    I&O's Summary    12 May 2020 07:01  -  13 May 2020 07:00  --------------------------------------------------------  IN: 0 mL / OUT: 750 mL / NET: -750 mL        PHYSICAL EXAM:  GENERAL: patient currently on bipapa  HEAD:  Atraumatic, Normocephalic  EYES: EOMI, PERRLA, conjunctiva and sclera clear  NECK: Supple, No JVD  CHEST/LUNG: Clear to auscultation bilaterally; No wheeze  HEART: Regular rate and rhythm; No murmurs, rubs, or gallops  ABDOMEN: Soft, Nontender, Nondistended; Bowel sounds present  EXTREMITIES:  2+ Peripheral Pulses, No clubbing, cyanosis, or edema  NEUROLOGY: non-focal      LABS:                        11.0   16.67 )-----------( 254      ( 13 May 2020 04:20 )             35.5     05-12    141  |  102  |  54<H>  ----------------------------<  217<H>  3.3<L>   |  18<L>  |  2.38<H>    Ca    9.2      12 May 2020 06:54  Phos  4.7     05-12  Mg     2.3     05-12    TPro  6.5  /  Alb  2.6<L>  /  TBili  0.3  /  DBili  0.1  /  AST  17  /  ALT  13  /  AlkPhos  358<H>  05-12    PTT - ( 13 May 2020 04:20 )  PTT:105.9 sec  CARDIAC MARKERS ( 13 May 2020 04:20 )  x     / x     / 51 U/L / x     / 1.0 ng/mL              RADIOLOGY & ADDITIONAL TESTS:    Imaging Personally Reviewed:    Consultant(s) Notes Reviewed:      Care Discussed with Consultants/Other Providers:

## 2020-05-13 NOTE — CONSULT NOTE ADULT - PROBLEM SELECTOR RECOMMENDATION 3
Accuchecks monitoring and insulin sliding scale coverage, no concentrated sweets diet, serial labs and f/up with PMD, monitor HB A 1 C every 3-4 mnth

## 2020-05-13 NOTE — RAPID RESPONSE TEAM SUMMARY - NSSITUATIONBACKGROUNDRRT_GEN_ALL_CORE
74 year old male with a PMH of CAD s/p CABG, T2DM, HTN, HLD, Prostate Cancer on chemo presenting with bilateral lower extremity weakness concerning for cord compression, found to have metastatic disease. Now with hypoxia requiring 6L NC, COVID PCR negative x2. Hypoxia thought to be secondary to new finding of PE.    RRT called for hypoxic respiratory failure.  Primary team, attending and family notified of the change in situation.  Family affirmed that patient should be full code.

## 2020-05-13 NOTE — PROGRESS NOTE ADULT - ATTENDING COMMENTS
Patient seen and examined.   1. Acute hypoxic/hypercapneic resp failure: increasing O2 requirement with CO2 with lethargy. high risk for PEs unable to confirm with CTA due to MARJORIE but also V/Q scan due to pandemic. Started on emperic AC. cont with BIPAP and monitor O2/ABG closely.  Resp status and mental status tenuous. will need to address goals of care with family.   Would avoid sedatives.   2. LE weakness: further imaging ie MRIs on hold until resp status is stable. given increasing O2 and BIPAP needs unable to perform at this time.   3. Prostate Ca: likely metatstatic. chemo on hold until stable.   4. MARJORIE: worsening MARJORIE with poor po and outpt. renal consult. hold further lasix for now. will start IVF given poor po intake concerns for pre-renal picture. no hypotension or new drugs prior to admission to cause ATN/AIN.   Overall prognosis guarded. remain FULL CODE.

## 2020-05-13 NOTE — PROGRESS NOTE ADULT - SUBJECTIVE AND OBJECTIVE BOX
RUSTY ARANDA 74y [id number]    Patient is a 74y old  Male who presents with a chief complaint of Weakness (13 May 2020 12:59)      Follow Up/CC:  ID following for leukocytosis    Interval History/ROS: no fever, on BIPAP    Allergies    No Known Allergies    Intolerances        ANTIMICROBIALS:  piperacillin/tazobactam IVPB. 3.375 once  piperacillin/tazobactam IVPB.. 3.375 every 8 hours      MEDICATIONS  (STANDING):  amLODIPine   Tablet 5 milliGRAM(s) Oral daily  dextrose 5%. 1000 milliLiter(s) (50 mL/Hr) IV Continuous <Continuous>  dextrose 50% Injectable 12.5 Gram(s) IV Push once  dextrose 50% Injectable 25 Gram(s) IV Push once  dextrose 50% Injectable 25 Gram(s) IV Push once  heparin  Infusion.  Unit(s)/Hr (19 mL/Hr) IV Continuous <Continuous>  insulin lispro (HumaLOG) corrective regimen sliding scale   SubCutaneous three times a day before meals  insulin lispro (HumaLOG) corrective regimen sliding scale   SubCutaneous at bedtime  norepinephrine Infusion 0.05 MICROgram(s)/kG/Min (9.99 mL/Hr) IV Continuous <Continuous>  piperacillin/tazobactam IVPB. 3.375 Gram(s) IV Intermittent once  piperacillin/tazobactam IVPB.. 3.375 Gram(s) IV Intermittent every 8 hours  polyethylene glycol 3350 17 Gram(s) Oral two times a day  senna 2 Tablet(s) Oral at bedtime  sodium chloride 0.9%. 1000 milliLiter(s) (75 mL/Hr) IV Continuous <Continuous>    MEDICATIONS  (PRN):  dextrose 40% Gel 15 Gram(s) Oral once PRN Blood Glucose LESS THAN 70 milliGRAM(s)/deciliter  glucagon  Injectable 1 milliGRAM(s) IntraMuscular once PRN Glucose LESS THAN 70 milligrams/deciliter        Vital Signs Last 24 Hrs  T(C): 37.1 (13 May 2020 15:30), Max: 37.2 (12 May 2020 17:19)  T(F): 98.8 (13 May 2020 15:30), Max: 99 (12 May 2020 17:19)  HR: 88 (13 May 2020 15:30) (84 - 97)  BP: 105/64 (13 May 2020 15:30) (105/64 - 148/67)  BP(mean): 84 (13 May 2020 05:54) (84 - 87)  RR: 20 (13 May 2020 15:30) (18 - 28)  SpO2: 75% (13 May 2020 15:30) (75% - 99%)    CBC Full  -  ( 13 May 2020 16:05 )  WBC Count : 19.93 K/uL  RBC Count : 3.53 M/uL  Hemoglobin : 10.1 g/dL  Hematocrit : 33.6 %  Platelet Count - Automated : 296 K/uL  Mean Cell Volume : 95.2 fl  Mean Cell Hemoglobin : 28.6 pg  Mean Cell Hemoglobin Concentration : 30.1 gm/dL  Auto Neutrophil # : 17.09 K/uL  Auto Lymphocyte # : 0.92 K/uL  Auto Monocyte # : 1.49 K/uL  Auto Eosinophil # : 0.17 K/uL  Auto Basophil # : 0.10 K/uL  Auto Neutrophil % : 85.7 %  Auto Lymphocyte % : 4.6 %  Auto Monocyte % : 7.5 %  Auto Eosinophil % : 0.9 %  Auto Basophil % : 0.5 %    05-13    144  |  105  |  80<H>  ----------------------------<  218<H>  3.7   |  22  |  3.72<H>    Ca    8.7      13 May 2020 15:50  Phos  4.7     05-12  Mg     2.3     05-12    TPro  6.6  /  Alb  2.3<L>  /  TBili  0.2  /  DBili  x   /  AST  12  /  ALT  11  /  AlkPhos  296<H>  05-13    LIVER FUNCTIONS - ( 13 May 2020 15:50 )  Alb: 2.3 g/dL / Pro: 6.6 g/dL / ALK PHOS: 296 U/L / ALT: 11 U/L / AST: 12 U/L / GGT: x               MICROBIOLOGY:  .Blood Blood-Peripheral  05-08-20   No growth to date.  --  --      .Urine Clean Catch (Midstream)  05-08-20   No growth  --  --      COVID-19 PCR . (05.11.20 @ 15:47)    COVID-19 PCR: NotDetec: This test has been validated by [facility] to be accurate;  though it has not been FDA cleared/approved by the usual pathway.  As with all laboratory tests, results should be correlated with clinical  findings.  https://www.fda.gov/media/895156/download  https://www.fda.gov/media/427544/download          RADIOLOGY    < from: Xray Chest 1 View- PORTABLE-Urgent (05.12.20 @ 03:34) >  The heart is normal in size. Bibasilar platelike atelectasis. Status post sternotomy. No pneumothorax.    < end of copied text >

## 2020-05-13 NOTE — PROGRESS NOTE ADULT - PROBLEM SELECTOR PLAN 4
- on CT spine sclerotic and lytic pattern to the right iliac crest with asymmetric right iliac is muscle could represent pelvic hematoma due to adjacent pathologic nondisplaced fracture versus soft tissue neoplasm.  - Spoke to radiology ideally would need a contrast study to evaluate further but given his MARJORIE on CKD unable to do so, had CT bony pelvis which would help identify further  - plan to repeat MRI of pelvis with anesthesia given patient unable to tolerate procedure and patient stable

## 2020-05-13 NOTE — PROGRESS NOTE ADULT - PROBLEM SELECTOR PLAN 6
Baseline SCr 1.2-1.4. Has not been urinating the past few days. BUN/Cr ~25. Could be combination of obstructive and pre-renal   - Fuller in place, monitor UOP, trend Cr  - Monitor electrolytes  - May need renal eval.

## 2020-05-13 NOTE — PROGRESS NOTE ADULT - PROBLEM SELECTOR PLAN 3
- History of prostate cancer diagnosed many years ago on active chemo. Denies RT or surgeries. Managed by [facility] Dr. Oneil. Based on imaging has diffuse metastatic lesions to the bone.   - CT showing multiple sclerotic lesions in bony pelvis concerning for metastatic disease.  - PSA significantly elevated, per outpt onco last PSA in 240s   - C/w Fuller for urinary retention

## 2020-05-13 NOTE — PROGRESS NOTE ADULT - PROBLEM SELECTOR PLAN 5
Neutrophilic predominant leukocytosis likely 2/2 to malignacy/mets givne pancreatic cancer hx. Otherwise patient remains afebrile.   - BCx and UCx NGTD, UA unremarkable   - received total of 43 days of zosyn; d/c zosyn per ID recs patient without any signs of infection, leukocytosis likely 2/2 to malignancy/mets   - US duplex of LE's was negative.

## 2020-05-14 NOTE — PROGRESS NOTE ADULT - SUBJECTIVE AND OBJECTIVE BOX
Follow Up:      Inverval History/ROS:Patient is a 74y old  Male who presents with a chief complaint of Weakness (14 May 2020 08:04)    Febrile this am.  On vent  On pressors    Allergies    No Known Allergies    Intolerances        ANTIMICROBIALS:  piperacillin/tazobactam IVPB.. 3.375 every 8 hours      OTHER MEDS:  chlorhexidine 0.12% Liquid 15 milliLiter(s) Oral Mucosa every 12 hours  dexMEDEtomidine Infusion 0.1 MICROgram(s)/kG/Hr IV Continuous <Continuous>  dextrose 40% Gel 15 Gram(s) Oral once PRN  dextrose 5%. 1000 milliLiter(s) IV Continuous <Continuous>  dextrose 50% Injectable 12.5 Gram(s) IV Push once  dextrose 50% Injectable 25 Gram(s) IV Push once  dextrose 50% Injectable 25 Gram(s) IV Push once  glucagon  Injectable 1 milliGRAM(s) IntraMuscular once PRN  heparin  Infusion 1500 Unit(s)/Hr IV Continuous <Continuous>  HYDROmorphone  Injectable 0.5 milliGRAM(s) IV Push every 3 hours PRN  insulin lispro (HumaLOG) corrective regimen sliding scale   SubCutaneous every 4 hours  insulin NPH human recombinant 4 Unit(s) SubCutaneous every 12 hours  norepinephrine Infusion 0.05 MICROgram(s)/kG/Min IV Continuous <Continuous>  pantoprazole  Injectable 40 milliGRAM(s) IV Push daily  potassium chloride  10 mEq/100 mL IVPB 10 milliEquivalent(s) IV Intermittent every 1 hour  vasopressin Infusion 0.03 Unit(s)/Min IV Continuous <Continuous>      Vital Signs Last 24 Hrs  T(C): 37.9 (14 May 2020 15:00), Max: 39.2 (14 May 2020 04:00)  T(F): 100.2 (14 May 2020 15:00), Max: 102.6 (14 May 2020 04:00)  HR: 54 (14 May 2020 15:15) (52 - 101)  BP: 133/53 (13 May 2020 20:15) (101/49 - 133/53)  BP(mean): 77 (13 May 2020 20:15) (67 - 90)  RR: 30 (14 May 2020 15:15) (16 - 50)  SpO2: 100% (14 May 2020 15:15) (88% - 100%)    PHYSICAL EXAM:  General: [x ] intubated  HEAD/EYES: [ ] PERRL [x ] white sclera [ ] icterus  ENT:  [ ] normal x[ ] supple [ ] thrush [ ] pharyngeal exudate  Cardiovascular:   [ ] murmur [ x] normal [ ] PPM/AICD  Respiratory:  [x ] clear to ausculation bilaterally  GI:  [x ] soft, non-tender, normal bowel sounds  :  [x ] mccann [ ] no CVA tenderness   Musculoskeletal:  [x ] no synovitis  Neurologic:  [ ] non-focal exam   Skin:  [x ] no rash  Lymph: [x ] no lymphadenopathy  Psychiatric:  [ ] appropriate affect [ ] alert & oriented  Lines:  [x ] no phlebitis [x ] central line                                9.0    16.94 )-----------( 237      ( 14 May 2020 01:52 )             27.5           142  |  106  |  79<H>  ----------------------------<  205<H>  3.3<L>   |  18<L>  |  3.44<H>    Ca    8.2<L>      14 May 2020 12:50  Phos  2.2       Mg     2.6         TPro  5.9<L>  /  Alb  2.3<L>  /  TBili  0.2  /  DBili  x   /  AST  11  /  ALT  9<L>  /  AlkPhos  240<H>        Urinalysis Basic - ( 13 May 2020 18:19 )    Color: Light Yellow / Appearance: Slightly Turbid / S.018 / pH: x  Gluc: x / Ketone: Trace  / Bili: Small / Urobili: Negative   Blood: x / Protein: 30 mg/dL / Nitrite: Negative   Leuk Esterase: Moderate / RBC: >50 /hpf / WBC 7 /HPF   Sq Epi: x / Non Sq Epi: 3 / Bacteria: Few        MICROBIOLOGY:Culture Results:   No Growth Final (20 @ 22:14)  Culture Results:   No Growth Final (20 @ 22:14)  Culture Results:   No growth (20 @ 15:09)      RADIOLOGY:

## 2020-05-14 NOTE — PROGRESS NOTE ADULT - ATTENDING COMMENTS
acute respiratory failure  -30 / 450 / 50% / +10  =>  7.44 / 32 / 98 / 22    pulmonary hypertension  -decrease PEEP to decrease intrathoracic pressures    suspected pulmonary embolism  -continue heparin infusion    septic shock secondary to possible pneumonia  -norepinephrine and vasopressin infusions  -continue empiric Zosyn  -check procalcitonin level  -BCx (5/13) - NGTD x 2 sets  -sputum Cx (5/14) - no organisms on gram stain    MARJORIE  -pre-renal (FENa < 0.6%)  -hold diuresis

## 2020-05-14 NOTE — DIETITIAN INITIAL EVALUATION ADULT. - PROBLEM SELECTOR PLAN 1
Presented with weakness in his lower extremities. Has urinary retention and no bowel incontinence. ANDREE by neurosurgery has decreased rectal tone.   - Has had multiple imaging modalities MRI of the T/L that is incomplete due to claustrophobia seems like by imaging no signs of cord compression but recommending MRI of the pelvis  - Spoke to neurosurgery seems no signs of acute cord compression but imaging is not complete, believe it is due to chronic progressive weakness? and metastatic disease not recommending Decadron at this moment or any acute surgical interventions  - weakness may also be due to progression of prostate CA   - Neuro checks Q4H  - PT consult   - Trend CK s/p IVF

## 2020-05-14 NOTE — PROGRESS NOTE ADULT - ASSESSMENT
ASSESSMENT:  74 year old male with a PMH of CAD s/p CABG, T2DM, HTN, HLD, Prostate Cancer on chemo presenting with bilateral lower extremity weakness concerning for cord compression, found to have metastatic disease. Now with hypoxia requiring escalation of respiratory care, now intubated in ICU, requiring pressers.       PLAN:   Neurologic:   - Precedex     Respiratory:   - Intubated  - Mechanical ventilation  - trend D dimer  - Q6 ABGs    Cardiovascular:   - levo/vaso  - a line for monitoring  - pending echo    Gastrointestinal/Nutrition:   - NPO  - PPI for prophylaxis     Renal/Genitourinary:   - NS @ 75    Hematologic:   - heparin gtt for presumed PE    Infectious Disease:   - zosyn for possible aspiration   - repeat COVID testing     Lines/Tubes:  - Arterial line left radial (5/13)  - Central line (5/13)    Endocrine:   - ISS for elevated finger sticks    Disposition:   ICU

## 2020-05-14 NOTE — DIETITIAN INITIAL EVALUATION ADULT. - OTHER INFO
Per chart, 75 y/o male "PMH of CAD s/p CABG, T2DM, HTN, HLD, Prostate Cancer on chemo presenting with bilateral lower extremity weakness concerning for cord compression, found to have metastatic disease. Now with hypoxia requiring escalation of respiratory care, now intubated in ICU, requiring pressers." Pt on low dose pressors    Information obtained from: EMR, communication with team, prior RD note from 2014; pt is currently intubated and sedated    Prior to admission: Unable to obtain subjective information at this time. Noted pt is currently undergoing oral chemotherapy for prostate cancer, with potential to be affecting his PO intake. H&P states pt takes metformin and glipizide at home, HbA1c is 8.4% (5/9) indicating poor glycemic control. Prior RD note states the pt was not compliant with consistent carbohydrate diet.    This admission: Pt was receiving a DASH, Consistent Carbohydrate diet prior to intubation. Per chart, NKFA, no micronutrient supplementation PTA, last BM 5/13.    Weight Hx: Prior RD note states pt's UBW was 240 pounds. This admission dosing wt is 235 pounds (5/8) and bed scale wts show 235 pounds (5/13) and 222 pounds (5/14). ?Accuracy of these weights. New wt is needed.

## 2020-05-14 NOTE — PROGRESS NOTE ADULT - SUBJECTIVE AND OBJECTIVE BOX
HISTORY  Mr. Hicks is a 74 year old male with a PMH of CAD s/p CABG, T2DM, HTN, HLD, Prostate Cancer on chemo presenting with bilateral lower extremity weakness. Patient reports that a couple days had an episode where he got up from the chair and due to weakness in his thighs fell to the ground forward. Denies LOC or head trauma. No dizziness, LH, chest pain, shortness of breath or palpitations before. Was on the ground for about an hour and had son-in-law help him up as he felt too weak. Denies back pain or numbness/tingling in his legs. Has been having progressive weakness for the past few weeks. Seems like Neurosurgery spoke to wife and has had progressive weakness for more than a year. Has had no fevers/chills, cough, abdominal pain, nausea, vomiting or diarrhea. Has not been urinating the past few days. No bowel incontinence. Denies saddle anesthesia. [facility], and takes oral chemotherapy.     24 HOUR EVENTS:  -glucose elevated  -Cr elevated  -no acute issues overnight    SUBJECTIVE/ROS:  [ ] A ten-point review of systems was otherwise negative except as noted.  [ ] Due to altered mental status/intubation, subjective information were not able to be obtained from the patient. History was obtained, to the extent possible, from review of the chart and collateral sources of information.      NEURO  RASS:     GCS:     CAM ICU:  Exam: awake, alert, oriented  Meds: dexMEDEtomidine Infusion 0.1 MICROgram(s)/kG/Hr IV Continuous <Continuous>  HYDROmorphone  Injectable 0.5 milliGRAM(s) IV Push every 3 hours PRN pain    [x] Adequacy of sedation and pain control has been assessed and adjusted      RESPIRATORY  RR: 30 (05-14-20 @ 08:00) (16 - 50)  SpO2: 99% (05-14-20 @ 08:00) (75% - 100%)  Wt(kg): --  Exam: unlabored, clear to auscultation bilaterally  Mechanical Ventilation: Mode: AC/ CMV (Assist Control/ Continuous Mandatory Ventilation), RR (machine): 30, RR (patient): 30, TV (machine): 450, FiO2: 50, PEEP: 10, ITime: 0.7, MAP: 17, PIP: 28  ABG - ( 14 May 2020 01:47 )  pH: 7.44  /  pCO2: 32    /  pO2: 98    / HCO3: 22    / Base Excess: -1.4  /  SaO2: 98      Lactate: x                [N/A] Extubation Readiness Assessed  Meds:       CARDIOVASCULAR  HR: 81 (05-14-20 @ 08:00) (62 - 101)  BP: 133/53 (05-13-20 @ 20:15) (101/49 - 141/62)  BP(mean): 77 (05-13-20 @ 20:15) (67 - 90)  ABP: 136/42 (05-14-20 @ 08:00) (118/50 - 164/59)  ABP(mean): 68 (05-14-20 @ 08:00) (57 - 84)  Wt(kg): --  CVP(cm H2O): --  VBG - ( 14 May 2020 01:47 )  pH: 7.40  /  pCO2: 38    /  pO2: 38    / HCO3: 23    / Base Excess: -1.1  /  SaO2: 74     Lactate: 1.7                Exam: regular rate and rhythm  Cardiac Rhythm: sinus  Perfusion     [x]Adequate   [ ]Inadequate  Mentation   [x]Normal       [ ]Reduced  Extremities  [x]Warm         [ ]Cool  Volume Status [ ]Hypervolemic [x]Euvolemic [ ]Hypovolemic  Meds: norepinephrine Infusion 0.05 MICROgram(s)/kG/Min IV Continuous <Continuous>        GI/NUTRITION  Exam: soft, nontender, nondistended, incision C/D/I  Diet:  Meds: pantoprazole  Injectable 40 milliGRAM(s) IV Push daily      GENITOURINARY  I&O's Detail    05-13 @ 07:01  -  05-14 @ 07:00  --------------------------------------------------------  IN:    dexmedetomidine Infusion: 268.4 mL    Enteral Tube Flush: 30 mL    heparin  Infusion.: 267 mL    heparin Infusion: 36 mL    IV PiggyBack: 316.6 mL    lactated ringers.: 1200 mL    norepinephrine Infusion: 354.4 mL    sodium chloride 0.9%: 225 mL    Solution: 350 mL    vasopressin Infusion: 23.4 mL  Total IN: 3070.8 mL    OUT:    Indwelling Catheter - Urethral: 355 mL    Nasoenteral Tube: 150 mL  Total OUT: 505 mL    Total NET: 2565.8 mL      05-14 @ 07:01  -  05-14 @ 08:05  --------------------------------------------------------  IN:    dexmedetomidine Infusion: 18.6 mL    heparin Infusion: 18 mL    IV PiggyBack: 83.3 mL    lactated ringers.: 100 mL    norepinephrine Infusion: 13.5 mL    Solution: 25 mL    vasopressin Infusion: 1.8 mL  Total IN: 260.2 mL    OUT:    Indwelling Catheter - Urethral: 25 mL  Total OUT: 25 mL    Total NET: 235.2 mL          05-14    144  |  106  |  78<H>  ----------------------------<  205<H>  3.2<L>   |  20<L>  |  3.73<H>    Ca    8.9      14 May 2020 01:52  Phos  2.2     05-14  Mg     2.6     05-14    TPro  5.9<L>  /  Alb  2.3<L>  /  TBili  0.2  /  DBili  x   /  AST  11  /  ALT  9<L>  /  AlkPhos  240<H>  05-14    [ ] Fuller catheter, indication: N/A  Meds: lactated ringers. 1000 milliLiter(s) IV Continuous <Continuous>  potassium chloride  20 mEq/100 mL IVPB 20 milliEquivalent(s) IV Intermittent once        HEMATOLOGIC  Meds: heparin  Infusion 1800 Unit(s)/Hr IV Continuous <Continuous>    [x] VTE Prophylaxis                        9.0    16.94 )-----------( 237      ( 14 May 2020 01:52 )             27.5     PT/INR - ( 14 May 2020 01:52 )   PT: 14.8 sec;   INR: 1.29 ratio         PTT - ( 14 May 2020 01:52 )  PTT:98.7 sec  Transfusion     [ ] PRBC   [ ] Platelets   [ ] FFP   [ ] Cryoprecipitate      INFECTIOUS DISEASES  WBC Count: 16.94 K/uL (05-14 @ 01:52)  WBC Count: 19.93 K/uL (05-13 @ 16:05)    RECENT CULTURES:  Specimen Source: .Sputum Sputum  Date/Time: 05-14 @ 02:58  Culture Results: --  Gram Stain:   Numerous polymorphonuclear leukocytes per low power field  Few Squamous epithelial cells per low power field  No organisms seen per oil power field  Organism: --    Meds: piperacillin/tazobactam IVPB.. 3.375 Gram(s) IV Intermittent every 8 hours        ENDOCRINE  CAPILLARY BLOOD GLUCOSE      POCT Blood Glucose.: 244 mg/dL (14 May 2020 06:34)  POCT Blood Glucose.: 229 mg/dL (14 May 2020 01:00)  POCT Blood Glucose.: 217 mg/dL (13 May 2020 21:32)  POCT Blood Glucose.: 210 mg/dL (13 May 2020 17:50)  POCT Blood Glucose.: 217 mg/dL (13 May 2020 15:32)  POCT Blood Glucose.: 192 mg/dL (13 May 2020 14:04)  POCT Blood Glucose.: 212 mg/dL (13 May 2020 10:40)    Meds: insulin lispro (HumaLOG) corrective regimen sliding scale   SubCutaneous every 4 hours  vasopressin Infusion 0.03 Unit(s)/Min IV Continuous <Continuous>        ACCESS DEVICES:  [ ] Peripheral IV  [ ] Central Venous Line	[ ] R	[ ] L	[ ] IJ	[ ] Fem	[ ] SC	Placed:   [ ] Arterial Line		[ ] R	[ ] L	[ ] Fem	[ ] Rad	[ ] Ax	Placed:   [ ] PICC:					[ ] Mediport  [ ] Urinary Catheter, Date Placed:   [x] Necessity of urinary, arterial, and venous catheters discussed    OTHER MEDICATIONS:  chlorhexidine 0.12% Liquid 15 milliLiter(s) Oral Mucosa every 12 hours      CODE STATUS:      IMAGING:

## 2020-05-14 NOTE — PROGRESS NOTE ADULT - ASSESSMENT
Mr. Hicks is a 74 year old male with a PMH of CAD s/p CABG, T2DM, HTN, HLD, Prostate Cancer on chemo presenting with bilateral lower extremity weakness. Patient reports that a couple days had an episode where he got up from the chair and due to weakness in his thighs fell to the ground forward. Denies LOC or head trauma. No dizziness, LH, chest pain, shortness of breath or palpitations before. Was on the ground for about an hour and had son-in-law help him up as he felt too weak. Denies back pain or numbness/tingling in his legs. Has been having progressive weakness for the past few weeks. Seems like Neurosurgery spoke to wife and has had progressive weakness for more than a year. Has had no fevers/chills, cough, abdominal pain, nausea, vomiting or diarrhea. Has not been urinating the past few days. No bowel incontinence. Denies saddle anesthesia. His [facility], and takes oral chemotherapy.     In the ED:  Vitals: 98.6F /69 HR 75 RR 16   Labs: WBC 18.89 Hgb 10.6 BUN 51 SCr 2.19   COVID x 1 neg  Consults: Neurosurgery  Interventions: Vanc 1g x 1, Zosyn x 1, NS Bolus 500cc x 2, KCl 40 mEq x 1 (08 May 2020 21:48)

## 2020-05-14 NOTE — DIETITIAN INITIAL EVALUATION ADULT. - PROBLEM SELECTOR PLAN 2
History of prostate cancer diagnosed many years ago on active chemo. Denies RT or surgeries. Managed by [facility]rolog[facility] at [facility] Dr. Oneil. Based on imaging has diffuse metastatic lesions to the bone.   - He is unaware that it is metastatic ideally should speak to wife for more collateral information before informing the patient  - Should speak to his Oncologist of his current status  - Might be the explanation for his weakness?

## 2020-05-14 NOTE — DIETITIAN INITIAL EVALUATION ADULT. - ENTERAL
With consideration for intubation, if/when medically feasible recommend: Vital AF starting at 10mL/hr, increase 10mL q 6 hours until goal rate of 64dQo05 hours. Regimen at goal provides: 1680mL formula, 1362 mL Free water, 2016 kcal and 126g protein (20kcal/kg current wt 100.6kg and 1.8g protein/kg IBW 69.8kg). Meets 100% RDIs

## 2020-05-14 NOTE — DIETITIAN INITIAL EVALUATION ADULT. - PROBLEM SELECTOR PLAN 4
Neutrophilic predominant leukocytosis. No fevers here or at home as per patient. No other infectious symptoms. UA appears negative. CXR shows possible RML opacity. Normal lactate. s/p Zosyn and Vancomycin.   - BCx and UCx pending  - Was relatively hypotensive in the ED now SBPs have improved is it because of infection? Is immunocompromised given being on chemo and has metastatic cancer. Will continue Zosyn for now. Check vancomycin level in AM

## 2020-05-14 NOTE — CHART NOTE - NSCHARTNOTEFT_GEN_A_CORE
Called and spoke to patient's wife, Yarelis. Daily updates given and all questions answered.     Keshia Lacey PA-C  [facility]

## 2020-05-14 NOTE — DIETITIAN INITIAL EVALUATION ADULT. - PROBLEM SELECTOR PLAN 3
on CT spine sclerotic and lytic pattern to the right iliac crest with asymmetric right iliac is muscle could represent pelvic hematoma due to adjacent pathologic nondisplaced fracture versus soft tissue neoplasm.  - Spoke to radiology ideally would need a contrast study to evaluate further but given his MARJORIE on CKD unable to do so, had CT bony pelvis which would help identify further  - Has MRI of pelvis pending may potentially provide more information  - Will monitor H/H, CBC in AM if dropping may need gen surgery to comment

## 2020-05-14 NOTE — DIETITIAN INITIAL EVALUATION ADULT. - PERTINENT LABORATORY DATA
05-14 Na144 mmol/L Glu 205 mg/dL<H> K+ 3.2 mmol/L<L> Cr  3.73 mg/dL<H> BUN 78 mg/dL<H> Phos 2.2 mg/dL<L> Alb 2.3 g/dL<L> PAB n/a

## 2020-05-14 NOTE — PROGRESS NOTE ADULT - PROBLEM SELECTOR PLAN 3
Accuchecks monitoring and insulin sliding scale coverage, no concentrated sweets diet, serial labs and f/up with PMD, monitor HB A 1 C every 3-4 mnth.

## 2020-05-14 NOTE — DIETITIAN INITIAL EVALUATION ADULT. - ENERGY NEEDS
The modified [employer] equation (LARSON) 2010 equation was used to calculator resting energy expenditure: 2100kcal  IBW used for protein needs: 154 pounds

## 2020-05-14 NOTE — PROGRESS NOTE ADULT - PROBLEM SELECTOR PLAN 1
CHRONIC KIDNEY DISEASE, STAGE 3: and ACUTE RENAL FAILURE:   Serum creatinine is  at   3. 44  , approximating GFR at decreased  ml/min.   There is  progression . No uremic symptoms   evidence of anemia .  Fluid status stable.  Will continue to avoid nephrotoxic drugs.  Patient remains asymptomatic.   Continue current therapy.  hold  diuretic.  hold   ACE inhibitor.  hold   ARB.  Additional evaluation:   ECG,    echocardiogram,     CXR,  will obtained recent   renal ultrasound to evalaute kidney size and possible stones ,  continue wit iv fluid.

## 2020-05-14 NOTE — PROGRESS NOTE ADULT - SUBJECTIVE AND OBJECTIVE BOX
Patient is a 74y Male whom presented to the hospital with ckd and zehra     PAST MEDICAL & SURGICAL HISTORY:  HTN (hypertension)  Prostate cancer  Hyperlipidemia  Neuropathy  Diabetes mellitus, type 2  CAD (coronary artery disease)  History of cholecystectomy  H/O coronary artery bypass surgery      MEDICATIONS  (STANDING):  calcium gluconate IVPB 1 Gram(s) IV Intermittent once  chlorhexidine 0.12% Liquid 15 milliLiter(s) Oral Mucosa every 12 hours  chlorhexidine 4% Liquid 1 Application(s) Topical <User Schedule>  chlorhexidine 4% Liquid 1 Application(s) Topical <User Schedule>  dexMEDEtomidine Infusion 0.1 MICROgram(s)/kG/Hr (2.67 mL/Hr) IV Continuous <Continuous>  dextrose 50% Injectable 50 milliLiter(s) IV Push once  heparin  Infusion.  Unit(s)/Hr (19 mL/Hr) IV Continuous <Continuous>  insulin lispro (HumaLOG) corrective regimen sliding scale   SubCutaneous every 4 hours  insulin regular  human recombinant. 10 Unit(s) IV Push once  lactated ringers. 1000 milliLiter(s) (100 mL/Hr) IV Continuous <Continuous>  norepinephrine Infusion 0.05 MICROgram(s)/kG/Min (9.99 mL/Hr) IV Continuous <Continuous>  pantoprazole  Injectable 40 milliGRAM(s) IV Push daily  piperacillin/tazobactam IVPB.. 3.375 Gram(s) IV Intermittent every 8 hours  vasopressin Infusion 0.03 Unit(s)/Min (1.8 mL/Hr) IV Continuous <Continuous>      Allergies    No Known Allergies    Intolerances        SOCIAL HISTORY:  Denies ETOh,Smoking,     FAMILY HISTORY:  No pertinent family history in first degree relatives      REVIEW OF SYSTEMS:    unable to obtained a good review system                            9.0    16.94 )-----------( 237      ( 14 May 2020 01:52 )             27.5       CBC Full  -  ( 14 May 2020 01:52 )  WBC Count : 16.94 K/uL  RBC Count : 3.03 M/uL  Hemoglobin : 9.0 g/dL  Hematocrit : 27.5 %  Platelet Count - Automated : 237 K/uL  Mean Cell Volume : 90.8 fl  Mean Cell Hemoglobin : 29.7 pg  Mean Cell Hemoglobin Concentration : 32.7 gm/dL  Auto Neutrophil # : x  Auto Lymphocyte # : x  Auto Monocyte # : x  Auto Eosinophil # : x  Auto Basophil # : x  Auto Neutrophil % : x  Auto Lymphocyte % : x  Auto Monocyte % : x  Auto Eosinophil % : x  Auto Basophil % : x          142  |  106  |  79<H>  ----------------------------<  205<H>  3.3<L>   |  18<L>  |  3.44<H>    Ca    8.2<L>      14 May 2020 12:50  Phos  2.2       Mg     2.6         TPro  5.9<L>  /  Alb  2.3<L>  /  TBili  0.2  /  DBili  x   /  AST  11  /  ALT  9<L>  /  AlkPhos  240<H>        CAPILLARY BLOOD GLUCOSE      POCT Blood Glucose.: 189 mg/dL (14 May 2020 21:22)  POCT Blood Glucose.: 189 mg/dL (14 May 2020 17:06)  POCT Blood Glucose.: 205 mg/dL (14 May 2020 14:21)  POCT Blood Glucose.: 205 mg/dL (14 May 2020 10:19)  POCT Blood Glucose.: 244 mg/dL (14 May 2020 06:34)  POCT Blood Glucose.: 229 mg/dL (14 May 2020 01:00)      Vital Signs Last 24 Hrs  T(C): 37.4 (14 May 2020 19:00), Max: 39.2 (14 May 2020 04:00)  T(F): 99.3 (14 May 2020 19:00), Max: 102.6 (14 May 2020 04:00)  HR: 87 (14 May 2020 22:00) (50 - 90)  BP: 134/60 (14 May 2020 19:00) (134/60 - 134/60)  BP(mean): 87 (14 May 2020 19:00) (87 - 87)  RR: 28 (14 May 2020 22:00) (24 - 30)  SpO2: 99% (14 May 2020 22:00) (93% - 100%)    Urinalysis Basic - ( 13 May 2020 18:19 )    Color: Light Yellow / Appearance: Slightly Turbid / S.018 / pH: x  Gluc: x / Ketone: Trace  / Bili: Small / Urobili: Negative   Blood: x / Protein: 30 mg/dL / Nitrite: Negative   Leuk Esterase: Moderate / RBC: >50 /hpf / WBC 7 /HPF   Sq Epi: x / Non Sq Epi: 3 / Bacteria: Few        PT/INR - ( 14 May 2020 18:33 )   PT: 18.9 sec;   INR: 1.62 ratio         PTT - ( 14 May 2020 18:33 )  PTT:106.7 sec      PHYSICAL EXAM:  intubated   Constitutional: on vent    HEENT: conjunctive   clear   Neck:  No JVD  Respiratory: decrease bs b/l   Cardiovascular: S1 and S2  Gastrointestinal: BS+, soft, NT/ND  Extremities: No peripheral edema  Skin: dry   Access: Not applicable

## 2020-05-14 NOTE — DIETITIAN INITIAL EVALUATION ADULT. - ADD RECOMMEND
If/when pt is progressed to PO diet, recommend DASH and Consistent Carbohydrate diet with ONS as necessary. Please monitor tolerance to diet prescription and electrolytes.

## 2020-05-15 NOTE — PROGRESS NOTE ADULT - PROBLEM SELECTOR PLAN 1
CHRONIC KIDNEY DISEASE, STAGE 3: and ACUTE RENAL FAILURE:   Serum creatinine is  at   3. 12  , approximating GFR at decreased  ml/min.   There is  progression . No uremic symptoms   evidence of anemia .  Fluid status stable.  Will continue to avoid nephrotoxic drugs.  Patient remains asymptomatic.   Continue current therapy.  hold  diuretic.  hold   ACE inhibitor.  hold   ARB.  Additional evaluation:   ECG,    echocardiogram,     CXR,  will obtained recent   renal ultrasound to evalaute kidney size and possible stones ,  continue wit iv fluid.

## 2020-05-15 NOTE — PROGRESS NOTE ADULT - SUBJECTIVE AND OBJECTIVE BOX
RUSTY ARANDA 74y [id number]    Patient is a 74y old  Male who presents with a chief complaint of Weakness (15 May 2020 00:54)      Follow Up/CC:  ID following for leukcoytosis    Interval History/ROS: no fever, intubated, in ICU    Allergies    No Known Allergies    Intolerances        ANTIMICROBIALS:  piperacillin/tazobactam IVPB.. 3.375 every 8 hours      MEDICATIONS  (STANDING):  chlorhexidine 0.12% Liquid 15 milliLiter(s) Oral Mucosa every 12 hours  dexMEDEtomidine Infusion 0.1 MICROgram(s)/kG/Hr (2.67 mL/Hr) IV Continuous <Continuous>  furosemide   Injectable 40 milliGRAM(s) IV Push once  furosemide   Injectable 40 milliGRAM(s) IV Push once  heparin  Infusion 1500 Unit(s)/Hr (14 mL/Hr) IV Continuous <Continuous>  insulin lispro (HumaLOG) corrective regimen sliding scale   SubCutaneous every 4 hours  insulin NPH human recombinant 4 Unit(s) SubCutaneous every 12 hours  norepinephrine Infusion 0.05 MICROgram(s)/kG/Min (9.99 mL/Hr) IV Continuous <Continuous>  pantoprazole  Injectable 40 milliGRAM(s) IV Push daily  piperacillin/tazobactam IVPB.. 3.375 Gram(s) IV Intermittent every 8 hours  vasopressin Infusion 0.03 Unit(s)/Min (1.8 mL/Hr) IV Continuous <Continuous>    MEDICATIONS  (PRN):  glucagon  Injectable 1 milliGRAM(s) IntraMuscular once PRN Glucose LESS THAN 70 milligrams/deciliter  HYDROmorphone  Injectable 0.5 milliGRAM(s) IV Push every 3 hours PRN pain        Vital Signs Last 24 Hrs  T(C): 36.6 (15 May 2020 11:00), Max: 37.9 (14 May 2020 15:00)  T(F): 97.8 (15 May 2020 11:00), Max: 100.2 (14 May 2020 15:00)  HR: 67 (15 May 2020 14:00) (49 - 120)  BP: 134/60 (14 May 2020 19:00) (134/60 - 134/60)  BP(mean): 87 (14 May 2020 19:00) (87 - 87)  RR: 16 (15 May 2020 14:00) (14 - 39)  SpO2: 100% (15 May 2020 14:00) (90% - 100%)    CBC Full  -  ( 15 May 2020 00:38 )  WBC Count : 15.81 K/uL  RBC Count : 2.77 M/uL  Hemoglobin : 8.0 g/dL  Hematocrit : 25.2 %  Platelet Count - Automated : 228 K/uL  Mean Cell Volume : 91.0 fl  Mean Cell Hemoglobin : 28.9 pg  Mean Cell Hemoglobin Concentration : 31.7 gm/dL  Auto Neutrophil # : x  Auto Lymphocyte # : x  Auto Monocyte # : x  Auto Eosinophil # : x  Auto Basophil # : x  Auto Neutrophil % : x  Auto Lymphocyte % : x  Auto Monocyte % : x  Auto Eosinophil % : x  Auto Basophil % : x    05-15    143  |  107  |  75<H>  ----------------------------<  230<H>  3.2<L>   |  19<L>  |  3.12<H>    Ca    8.5      15 May 2020 00:38  Phos  3.3     05-15  Mg     2.4     05-15    TPro  5.7<L>  /  Alb  2.0<L>  /  TBili  0.2  /  DBili  x   /  AST  9<L>  /  ALT  8<L>  /  AlkPhos  191<H>  05-15    LIVER FUNCTIONS - ( 15 May 2020 00:38 )  Alb: 2.0 g/dL / Pro: 5.7 g/dL / ALK PHOS: 191 U/L / ALT: 8 U/L / AST: 9 U/L / GGT: x           Urinalysis Basic - ( 13 May 2020 18:19 )    Color: Light Yellow / Appearance: Slightly Turbid / S.018 / pH: x  Gluc: x / Ketone: Trace  / Bili: Small / Urobili: Negative   Blood: x / Protein: 30 mg/dL / Nitrite: Negative   Leuk Esterase: Moderate / RBC: >50 /hpf / WBC 7 /HPF   Sq Epi: x / Non Sq Epi: 3 / Bacteria: Few        MICROBIOLOGY:  .Sputum Sputum  20   No growth to date.  --    Numerous polymorphonuclear leukocytes per low power field  Few Squamous epithelial cells per low power field  No organisms seen per oil power field      .Blood Blood-Peripheral  20   No growth to date.  --  --      .Blood Blood-Peripheral  20   No growth to date.  --  --      .Blood Blood-Peripheral  20   No Growth Final  --  --      .Urine Clean Catch (Midstream)  20   No growth  --  --    COVID-19 PCR . (20 @ 00:34)    COVID-19 PCR: NotDetec: This test has been validated by [employer] to be accurate;  though it has not been FDA cleared/approved by the usual pathway.  As with all laboratory tests, results should be correlated with clinical  findings.  https://www.fda.gov/media/156110/download  https://www.fda.gov/media/529234/download        RADIOLOGY    < from: Xray Chest 1 View- PORTABLE-Routine (20 @ 07:15) >    The mediastinal cardiac silhouette is unremarkable. Sternotomy. ET tube between clavicles and jony. Right IJ line in superior vena cava. Enteric tube in stomach.    Low lung volumes and rotation limit evaluation. Mild bibasilar opacities unchanged.     No acute osseous finding.     < end of copied text >

## 2020-05-15 NOTE — PROGRESS NOTE ADULT - SUBJECTIVE AND OBJECTIVE BOX
HISTORY  Mr. Hicks is a 74 year old male with a PMH of CAD s/p CABG, T2DM, HTN, HLD, Prostate Cancer on chemo presenting with bilateral lower extremity weakness. Patient reports that a couple days had an episode where he got up from the chair and due to weakness in his thighs fell to the ground forward. Denies LOC or head trauma. No dizziness, LH, chest pain, shortness of breath or palpitations before. Was on the ground for about an hour and had son-in-law help him up as he felt too weak. Denies back pain or numbness/tingling in his legs. Has been having progressive weakness for the past few weeks. Seems like Neurosurgery spoke to wife and has had progressive weakness for more than a year. Has had no fevers/chills, cough, abdominal pain, nausea, vomiting or diarrhea. Has not been urinating the past few days. No bowel incontinence. Denies saddle anesthesia. His [facility], and takes oral chemotherapy.     24 HOUR EVENTS:  -glucose elevated  -Cr elevated  -no acute issues overnight    SUBJECTIVE/ROS:  [ ] A ten-point review of systems was otherwise negative except as noted.  [ ] Due to altered mental status/intubation, subjective information were not able to be obtained from the patient. History was obtained, to the extent possible, from review of the chart and collateral sources of information.      NEURO  RASS:     GCS:     CAM ICU:  Exam: awake, alert, oriented  Meds: dexMEDEtomidine Infusion 0.1 MICROgram(s)/kG/Hr IV Continuous <Continuous>  HYDROmorphone  Injectable 0.5 milliGRAM(s) IV Push every 3 hours PRN pain    [x] Adequacy of sedation and pain control has been assessed and adjusted      RESPIRATORY  RR: 30 (05-14-20 @ 08:00) (16 - 50)  SpO2: 99% (05-14-20 @ 08:00) (75% - 100%)  Wt(kg): --  Exam: unlabored, clear to auscultation bilaterally  Mechanical Ventilation: Mode: AC/ CMV (Assist Control/ Continuous Mandatory Ventilation), RR (machine): 30, RR (patient): 30, TV (machine): 450, FiO2: 50, PEEP: 10, ITime: 0.7, MAP: 17, PIP: 28  ABG - ( 14 May 2020 01:47 )  pH: 7.44  /  pCO2: 32    /  pO2: 98    / HCO3: 22    / Base Excess: -1.4  /  SaO2: 98      Lactate: x                [N/A] Extubation Readiness Assessed  Meds:       CARDIOVASCULAR  HR: 81 (05-14-20 @ 08:00) (62 - 101)  BP: 133/53 (05-13-20 @ 20:15) (101/49 - 141/62)  BP(mean): 77 (05-13-20 @ 20:15) (67 - 90)  ABP: 136/42 (05-14-20 @ 08:00) (118/50 - 164/59)  ABP(mean): 68 (05-14-20 @ 08:00) (57 - 84)  Wt(kg): --  CVP(cm H2O): --  VBG - ( 14 May 2020 01:47 )  pH: 7.40  /  pCO2: 38    /  pO2: 38    / HCO3: 23    / Base Excess: -1.1  /  SaO2: 74     Lactate: 1.7                Exam: regular rate and rhythm  Cardiac Rhythm: sinus  Perfusion     [x]Adequate   [ ]Inadequate  Mentation   [x]Normal       [ ]Reduced  Extremities  [x]Warm         [ ]Cool  Volume Status [ ]Hypervolemic [x]Euvolemic [ ]Hypovolemic  Meds: norepinephrine Infusion 0.05 MICROgram(s)/kG/Min IV Continuous <Continuous>        GI/NUTRITION  Exam: soft, nontender, nondistended, incision C/D/I  Diet:  Meds: pantoprazole  Injectable 40 milliGRAM(s) IV Push daily      GENITOURINARY  I&O's Detail    05-13 @ 07:01  -  05-14 @ 07:00  --------------------------------------------------------  IN:    dexmedetomidine Infusion: 268.4 mL    Enteral Tube Flush: 30 mL    heparin  Infusion.: 267 mL    heparin Infusion: 36 mL    IV PiggyBack: 316.6 mL    lactated ringers.: 1200 mL    norepinephrine Infusion: 354.4 mL    sodium chloride 0.9%: 225 mL    Solution: 350 mL    vasopressin Infusion: 23.4 mL  Total IN: 3070.8 mL    OUT:    Indwelling Catheter - Urethral: 355 mL    Nasoenteral Tube: 150 mL  Total OUT: 505 mL    Total NET: 2565.8 mL      05-14 @ 07:01  -  05-14 @ 08:05  --------------------------------------------------------  IN:    dexmedetomidine Infusion: 18.6 mL    heparin Infusion: 18 mL    IV PiggyBack: 83.3 mL    lactated ringers.: 100 mL    norepinephrine Infusion: 13.5 mL    Solution: 25 mL    vasopressin Infusion: 1.8 mL  Total IN: 260.2 mL    OUT:    Indwelling Catheter - Urethral: 25 mL  Total OUT: 25 mL    Total NET: 235.2 mL          05-14    144  |  106  |  78<H>  ----------------------------<  205<H>  3.2<L>   |  20<L>  |  3.73<H>    Ca    8.9      14 May 2020 01:52  Phos  2.2     05-14  Mg     2.6     05-14    TPro  5.9<L>  /  Alb  2.3<L>  /  TBili  0.2  /  DBili  x   /  AST  11  /  ALT  9<L>  /  AlkPhos  240<H>  05-14    [ ] Fuller catheter, indication: N/A  Meds: lactated ringers. 1000 milliLiter(s) IV Continuous <Continuous>  potassium chloride  20 mEq/100 mL IVPB 20 milliEquivalent(s) IV Intermittent once        HEMATOLOGIC  Meds: heparin  Infusion 1800 Unit(s)/Hr IV Continuous <Continuous>    [x] VTE Prophylaxis                        9.0    16.94 )-----------( 237      ( 14 May 2020 01:52 )             27.5     PT/INR - ( 14 May 2020 01:52 )   PT: 14.8 sec;   INR: 1.29 ratio         PTT - ( 14 May 2020 01:52 )  PTT:98.7 sec  Transfusion     [ ] PRBC   [ ] Platelets   [ ] FFP   [ ] Cryoprecipitate      INFECTIOUS DISEASES  WBC Count: 16.94 K/uL (05-14 @ 01:52)  WBC Count: 19.93 K/uL (05-13 @ 16:05)    RECENT CULTURES:  Specimen Source: .Sputum Sputum  Date/Time: 05-14 @ 02:58  Culture Results: --  Gram Stain:   Numerous polymorphonuclear leukocytes per low power field  Few Squamous epithelial cells per low power field  No organisms seen per oil power field  Organism: --    Meds: piperacillin/tazobactam IVPB.. 3.375 Gram(s) IV Intermittent every 8 hours        ENDOCRINE  CAPILLARY BLOOD GLUCOSE      POCT Blood Glucose.: 244 mg/dL (14 May 2020 06:34)  POCT Blood Glucose.: 229 mg/dL (14 May 2020 01:00)  POCT Blood Glucose.: 217 mg/dL (13 May 2020 21:32)  POCT Blood Glucose.: 210 mg/dL (13 May 2020 17:50)  POCT Blood Glucose.: 217 mg/dL (13 May 2020 15:32)  POCT Blood Glucose.: 192 mg/dL (13 May 2020 14:04)  POCT Blood Glucose.: 212 mg/dL (13 May 2020 10:40)    Meds: insulin lispro (HumaLOG) corrective regimen sliding scale   SubCutaneous every 4 hours  vasopressin Infusion 0.03 Unit(s)/Min IV Continuous <Continuous>        ACCESS DEVICES:  [ ] Peripheral IV  [ ] Central Venous Line	[ ] R	[ ] L	[ ] IJ	[ ] Fem	[ ] SC	Placed:   [ ] Arterial Line		[ ] R	[ ] L	[ ] Fem	[ ] Rad	[ ] Ax	Placed:   [ ] PICC:					[ ] Mediport  [ ] Urinary Catheter, Date Placed:   [x] Necessity of urinary, arterial, and venous catheters discussed    OTHER MEDICATIONS:  chlorhexidine 0.12% Liquid 15 milliLiter(s) Oral Mucosa every 12 hours      CODE STATUS:      IMAGING: HISTORY  Mr. Hicks is a 74 year old male with a PMH of CAD s/p CABG, T2DM, HTN, HLD, Prostate Cancer on chemo presenting with bilateral lower extremity weakness. Patient reports that a couple days had an episode where he got up from the chair and due to weakness in his thighs fell to the ground forward. Denies LOC or head trauma. No dizziness, LH, chest pain, shortness of breath or palpitations before. Was on the ground for about an hour and had son-in-law help him up as he felt too weak. Denies back pain or numbness/tingling in his legs. Has been having progressive weakness for the past few weeks. Seems like Neurosurgery spoke to wife and has had progressive weakness for more than a year. Has had no fevers/chills, cough, abdominal pain, nausea, vomiting or diarrhea. Has not been urinating the past few days. No bowel incontinence. Denies saddle anesthesia. His [facility] is [facility], and takes oral chemotherapy.     24 HOUR EVENTS:  - PEEP weaned to 5  - Started ont ube feeds  - Started on NPH 4 Q6  - PTT supratherapeutic and heparin gtt adjusted      SUBJECTIVE/ROS:  [x] A ten-point review of systems was otherwise negative except as noted.  [ ] Due to altered mental status/intubation, subjective information were not able to be obtained from the patient. History was obtained, to the extent possible, from review of the chart and collateral sources of information.  IMAGING:    NEURO    Exam: Sedated  Meds: dexMEDEtomidine Infusion 0.1 MICROgram(s)/kG/Hr IV Continuous <Continuous>  HYDROmorphone  Injectable 0.5 milliGRAM(s) IV Push every 3 hours PRN pain    [x] Adequacy of sedation and pain control has been assessed and adjusted      RESPIRATORY  RR: 24 (05-15-20 @ 00:30) (24 - 30)  SpO2: 100% (05-15-20 @ 00:30) (91% - 100%)  Wt(kg): --  Exam: unlabored, clear to auscultation bilaterally  Mechanical Ventilation: Mode: AC/ CMV (Assist Control/ Continuous Mandatory Ventilation), RR (machine): 30, RR (patient): 24, TV (machine): 450, FiO2: 40, PEEP: 5, ITime: 1, MAP: 15, PIP: 22  ABG - ( 15 May 2020 00:31 )  pH: 7.41  /  pCO2: 35    /  pO2: 99    / HCO3: 22    / Base Excess: -2.2  /  SaO2: 98      Lactate: x                [N/A] Extubation Readiness Assessed  Meds:       CARDIOVASCULAR  HR: 60 (05-15-20 @ 00:30) (49 - 90)  BP: 134/60 (05-14-20 @ 19:00) (134/60 - 134/60)  BP(mean): 87 (05-14-20 @ 19:00) (87 - 87)  ABP: 141/40 (05-15-20 @ 00:30) (115/35 - 176/40)  ABP(mean): 72 (05-15-20 @ 00:30) (57 - 82)  Wt(kg): --  CVP(cm H2O): --  VBG - ( 15 May 2020 00:31 )  pH: 7.37  /  pCO2: 40    /  pO2: 36    / HCO3: 23    / Base Excess: -1.6  /  SaO2: 63     Lactate: 1.2                Exam: regular rate and rhythm  Cardiac Rhythm: sinus  Perfusion     [x]Adequate   [ ]Inadequate  Mentation   [x]Normal       [ ]Reduced  Extremities  [x]Warm         [ ]Cool  Volume Status [ ]Hypervolemic [x]Euvolemic [ ]Hypovolemic  Meds: norepinephrine Infusion 0.05 MICROgram(s)/kG/Min IV Continuous <Continuous>        GI/NUTRITION  Exam: soft, nontender, nondistended  Diet: Tube feeds  Meds: pantoprazole  Injectable 40 milliGRAM(s) IV Push daily      GENITOURINARY  I&O's Detail    05-13 @ 07:01  -  05-14 @ 07:00  --------------------------------------------------------  IN:    dexmedetomidine Infusion: 268.4 mL    Enteral Tube Flush: 30 mL    heparin  Infusion.: 267 mL    heparin Infusion: 36 mL    IV PiggyBack: 316.6 mL    lactated ringers.: 1200 mL    norepinephrine Infusion: 354.4 mL    sodium chloride 0.9%: 225 mL    Solution: 350 mL    vasopressin Infusion: 23.4 mL  Total IN: 3070.8 mL    OUT:    Indwelling Catheter - Urethral: 355 mL    Nasoenteral Tube: 150 mL  Total OUT: 505 mL    Total NET: 2565.8 mL      05-14 @ 07:01  -  05-15 @ 00:58  --------------------------------------------------------  IN:    dexmedetomidine Infusion: 258.5 mL    heparin Infusion: 175 mL    heparin Infusion: 90 mL    IV PiggyBack: 633.2 mL    lactated ringers.: 600 mL    Nepro with Carb Steady: 170 mL    norepinephrine Infusion: 86.8 mL    norepinephrine Infusion: 22 mL    Solution: 250 mL    vasopressin Infusion: 30.6 mL  Total IN: 2316.1 mL    OUT:    Indwelling Catheter - Urethral: 690 mL  Total OUT: 690 mL    Total NET: 1626.1 mL          05-14    142  |  106  |  79<H>  ----------------------------<  205<H>  3.3<L>   |  18<L>  |  3.44<H>    Ca    8.2<L>      14 May 2020 12:50  Phos  2.2     05-14  Mg     2.6     05-14    TPro  5.9<L>  /  Alb  2.3<L>  /  TBili  0.2  /  DBili  x   /  AST  11  /  ALT  9<L>  /  AlkPhos  240<H>  05-14    [ ] Fuller catheter, indication: N/A  Meds:       HEMATOLOGIC  Meds: heparin  Infusion 1500 Unit(s)/Hr IV Continuous <Continuous>    [x] VTE Prophylaxis                        8.0    15.81 )-----------( 228      ( 15 May 2020 00:38 )             25.2     PT/INR - ( 14 May 2020 18:33 )   PT: 18.9 sec;   INR: 1.62 ratio         PTT - ( 14 May 2020 18:33 )  PTT:106.7 sec  Transfusion     [ ] PRBC   [ ] Platelets   [ ] FFP   [ ] Cryoprecipitate      INFECTIOUS DISEASES  WBC Count: 15.81 K/uL (05-15 @ 00:38)  WBC Count: 16.94 K/uL (05-14 @ 01:52)    RECENT CULTURES:  Specimen Source: .Sputum Sputum  Date/Time: 05-14 @ 02:58  Culture Results: --  Gram Stain:   Numerous polymorphonuclear leukocytes per low power field  Few Squamous epithelial cells per low power field  No organisms seen per oil power field  Organism: --  Specimen Source: .Blood Blood-Peripheral  Date/Time: 05-13 @ 22:03  Culture Results:   No growth to date.  Gram Stain: --  Organism: --  Specimen Source: .Blood Blood-Peripheral  Date/Time: 05-13 @ 22:01  Culture Results:   No growth to date.  Gram Stain: --  Organism: --    Meds: piperacillin/tazobactam IVPB.. 3.375 Gram(s) IV Intermittent every 8 hours        ENDOCRINE  CAPILLARY BLOOD GLUCOSE      POCT Blood Glucose.: 189 mg/dL (14 May 2020 21:22)  POCT Blood Glucose.: 189 mg/dL (14 May 2020 17:06)  POCT Blood Glucose.: 205 mg/dL (14 May 2020 14:21)  POCT Blood Glucose.: 205 mg/dL (14 May 2020 10:19)  POCT Blood Glucose.: 244 mg/dL (14 May 2020 06:34)  POCT Blood Glucose.: 229 mg/dL (14 May 2020 01:00)    Meds: glucagon  Injectable 1 milliGRAM(s) IntraMuscular once PRN  insulin lispro (HumaLOG) corrective regimen sliding scale   SubCutaneous every 4 hours  insulin NPH human recombinant 4 Unit(s) SubCutaneous every 12 hours  vasopressin Infusion 0.03 Unit(s)/Min IV Continuous <Continuous>        ACCESS DEVICES:  [x] Peripheral IV  [x] Central Venous Line	[ ] R	[ ] L	[ ] IJ	[ ] Fem	[ ] SC	Placed:   [ ] Arterial Line		[ ] R	[ ] L	[ ] Fem	[ ] Rad	[ ] Ax	Placed:   [ ] PICC:					[ ] Mediport  [ ] Urinary Catheter, Date Placed:   [x] Necessity of urinary, arterial, and venous catheters discussed    OTHER MEDICATIONS:  chlorhexidine 0.12% Liquid 15 milliLiter(s) Oral Mucosa every 12 hours      CODE STATUS:      IMAGING:

## 2020-05-15 NOTE — PROGRESS NOTE ADULT - ATTENDING COMMENTS
Pt seen and examined. 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 Minimal oxygen requirements on the mechanical ventilation. Failed CPAP trial d/t increased work of breathing with desaturation this am. Cont daily weaning trials as tolerated. Cont Zosyn IV, leukocytosis improving, FUP Cxs and repeat serum procalcitonin level. Titrate pressors to keep MAP > 65. Cr trending down, serum proBNP elevated, gentle diuresis as tolerated to optimize volume status. TTE showing severe pulm HTN, currently on AC with a heparin gtt for possible PE. Will require a V/Q scan for diagnosis. Overall prognosis guarded.

## 2020-05-15 NOTE — PROGRESS NOTE ADULT - ASSESSMENT
74 year old male with a PMH of CAD s/p CABG, T2DM, HTN, HLD, Prostate Cancer on chemo presenting with bilateral lower extremity weakness concerning for cord compression, found to have metastatic disease. Now with hypoxia requiring escalation of respiratory care, now intubated in ICU, requiring pressers.       PLAN:   Neurologic: Sedated  - Precedex   - Dilaudid PRN    Respiratory:   - Intubated  - Mechanical ventilation 450/24/5/40  - Trend ABG  - Failed CPAP trial on 5/15    Cardiovascular:   - Wean Levo/Vaso for MAP>65  - a line for monitoring  - Trend BNP      Gastrointestinal/Nutrition:   - NPO with tube feeds  - PPI for prophylaxis     Renal/Genitourinary:   - Monitor UOP  - Trend Cr given MARJORIE     Hematologic:   - heparin gtt for presumed PE  - Monitor PTT  - Trend INR  - Monitor H/H    Infectious Disease:   - Zosyn for possible aspiration   - Trend Procal - 7.44    Lines/Tubes:  - Arterial line left radial (5/13)  - Central line (5/13)    Endocrine:   - NPH 4u Q6  - ISS for elevated finger sticks

## 2020-05-15 NOTE — PROGRESS NOTE ADULT - ASSESSMENT
ASSESSMENT:  74 year old male with a PMH of CAD s/p CABG, T2DM, HTN, HLD, Prostate Cancer on chemo presenting with bilateral lower extremity weakness concerning for cord compression, found to have metastatic disease. Now with hypoxia requiring escalation of respiratory care, now intubated in ICU, requiring pressers.       PLAN:   Neurologic:   - Precedex     Respiratory:   - Intubated  - Mechanical ventilation  - trend D dimer  - Q6 ABGs    Cardiovascular:   - levo/vaso  - a line for monitoring  - pending echo    Gastrointestinal/Nutrition:   - NPO  - PPI for prophylaxis     Renal/Genitourinary:   - NS @ 75    Hematologic:   - heparin gtt for presumed PE    Infectious Disease:   - zosyn for possible aspiration   - repeat COVID testing     Lines/Tubes:  - Arterial line left radial (5/13)  - Central line (5/13)    Endocrine:   - ISS for elevated finger sticks    Disposition:   ICU ASSESSMENT:  74 year old male with a PMH of CAD s/p CABG, T2DM, HTN, HLD, Prostate Cancer on chemo presenting with bilateral lower extremity weakness concerning for cord compression, found to have metastatic disease. Now with hypoxia requiring escalation of respiratory care, now intubated in ICU, requiring pressers.       PLAN:   Neurologic: Sedated  - Precedex   - Dilaudid PRN      Respiratory:   - Intubated  - Mechanical ventilation 30/450/5/40%  - Trend ABG  - AM CXR    Cardiovascular:   - Wean Levo/Vaso for MAP>65  - a line for monitoring  - Trend BNP      Gastrointestinal/Nutrition:   - NPO with tube feeds  - PPI for prophylaxis     Renal/Genitourinary:   - Monitor UOP  - Trend Cr given MARJORIE       Hematologic:   - heparin gtt for presumed PE  - Monitor PTT  - Trend INR  - Monitor H/H    Infectious Disease:   - Zosyn for possible aspiration   - Trend Procal - 7.44    Lines/Tubes:  - Arterial line left radial (5/13)  - Central line (5/13)    Endocrine:   - NPH 4u Q6  - ISS for elevated finger sticks    Disposition:   ICU

## 2020-05-15 NOTE — PROGRESS NOTE ADULT - SUBJECTIVE AND OBJECTIVE BOX
INTERVAL HPI/OVERNIGHT EVENTS:    - Failed CPAP trial due to hypoxemia and tachicardia.   - Received lasix 40 IV  - Follow BC  and procalcitonin  SUBJECTIVE: Patient seen and examined at bedside.       VITAL SIGNS:  ICU Vital Signs Last 24 Hrs  T(C): 36.6 (15 May 2020 11:00), Max: 37.6 (15 May 2020 03:00)  T(F): 97.8 (15 May 2020 11:00), Max: 99.6 (15 May 2020 03:00)  HR: 68 (15 May 2020 15:45) (49 - 120)  BP: 134/60 (14 May 2020 19:00) (134/60 - 134/60)  BP(mean): 87 (14 May 2020 19:00) (87 - 87)  ABP: 108/40 (15 May 2020 15:45) (108/40 - 182/62)  ABP(mean): 60 (15 May 2020 15:45) (57 - 107)  RR: 24 (15 May 2020 15:45) (9 - 39)  SpO2: 95% (15 May 2020 15:45) (90% - 100%)    Mode: AC/ CMV (Assist Control/ Continuous Mandatory Ventilation), RR (machine): 24, TV (machine): 450, FiO2: 40, PEEP: 5, ITime: 1, MAP: 12, PIP: 28  Plateau pressure:   P/F ratio:     -14 @ 07:  -  15 @ 07:00  --------------------------------------------------------  IN: 2874.7 mL / OUT: 1160 mL / NET: 1714.7 mL    15 @ 07:01  -  15 @ 16:23  --------------------------------------------------------  IN: 622.4 mL / OUT: 310 mL / NET: 312.4 mL      CAPILLARY BLOOD GLUCOSE      POCT Blood Glucose.: 165 mg/dL (15 May 2020 13:08)    ECG:    PHYSICAL EXAM:    Gen: sedated and intubated, looks comfortable  Neuro: sedated, looks comfortable  HEENT: NG tube in place  Resp: intubated, breathing with the vent      MEDICATIONS:  MEDICATIONS  (STANDING):  chlorhexidine 0.12% Liquid 15 milliLiter(s) Oral Mucosa every 12 hours  dexMEDEtomidine Infusion 0.1 MICROgram(s)/kG/Hr (2.67 mL/Hr) IV Continuous <Continuous>  furosemide   Injectable 40 milliGRAM(s) IV Push once  furosemide   Injectable 40 milliGRAM(s) IV Push once  heparin  Infusion 1500 Unit(s)/Hr (14 mL/Hr) IV Continuous <Continuous>  insulin lispro (HumaLOG) corrective regimen sliding scale   SubCutaneous every 4 hours  insulin NPH human recombinant 4 Unit(s) SubCutaneous every 12 hours  norepinephrine Infusion 0.05 MICROgram(s)/kG/Min (9.99 mL/Hr) IV Continuous <Continuous>  pantoprazole  Injectable 40 milliGRAM(s) IV Push daily  piperacillin/tazobactam IVPB.. 3.375 Gram(s) IV Intermittent every 8 hours  vasopressin Infusion 0.03 Unit(s)/Min (1.8 mL/Hr) IV Continuous <Continuous>    MEDICATIONS  (PRN):  glucagon  Injectable 1 milliGRAM(s) IntraMuscular once PRN Glucose LESS THAN 70 milligrams/deciliter  HYDROmorphone  Injectable 0.5 milliGRAM(s) IV Push every 3 hours PRN pain      ALLERGIES:  Allergies    No Known Allergies    Intolerances        LABS:                        8.0    15.81 )-----------( 228      ( 15 May 2020 00:38 )             25.2     05-15    146<H>  |  112<H>  |  69<H>  ----------------------------<  166<H>  3.7   |  20<L>  |  2.52<H>    Ca    8.5      15 May 2020 15:54  Phos  2.6     05-15  Mg     2.4     05-15    TPro  5.8<L>  /  Alb  1.9<L>  /  TBili  0.2  /  DBili  x   /  AST  11  /  ALT  8<L>  /  AlkPhos  176<H>  05-15    PT/INR - ( 15 May 2020 06:18 )   PT: 16.3 sec;   INR: 1.40 ratio         PTT - ( 15 May 2020 06:18 )  PTT:94.2 sec  Urinalysis Basic - ( 13 May 2020 18:19 )    Color: Light Yellow / Appearance: Slightly Turbid / S.018 / pH: x  Gluc: x / Ketone: Trace  / Bili: Small / Urobili: Negative   Blood: x / Protein: 30 mg/dL / Nitrite: Negative   Leuk Esterase: Moderate / RBC: >50 /hpf / WBC 7 /HPF   Sq Epi: x / Non Sq Epi: 3 / Bacteria: Few        RADIOLOGY & ADDITIONAL TESTS: Reviewed.

## 2020-05-15 NOTE — PROGRESS NOTE ADULT - SUBJECTIVE AND OBJECTIVE BOX
Patient is a 74y Male whom presented to the hospital with ckd and zehra     PAST MEDICAL & SURGICAL HISTORY:  HTN (hypertension)  Prostate cancer  Hyperlipidemia  Neuropathy  Diabetes mellitus, type 2  CAD (coronary artery disease)  History of cholecystectomy  H/O coronary artery bypass surgery      MEDICATIONS  (STANDING):  calcium gluconate IVPB 1 Gram(s) IV Intermittent once  chlorhexidine 0.12% Liquid 15 milliLiter(s) Oral Mucosa every 12 hours  chlorhexidine 4% Liquid 1 Application(s) Topical <User Schedule>  chlorhexidine 4% Liquid 1 Application(s) Topical <User Schedule>  dexMEDEtomidine Infusion 0.1 MICROgram(s)/kG/Hr (2.67 mL/Hr) IV Continuous <Continuous>  dextrose 50% Injectable 50 milliLiter(s) IV Push once  heparin  Infusion.  Unit(s)/Hr (19 mL/Hr) IV Continuous <Continuous>  insulin lispro (HumaLOG) corrective regimen sliding scale   SubCutaneous every 4 hours  insulin regular  human recombinant. 10 Unit(s) IV Push once  lactated ringers. 1000 milliLiter(s) (100 mL/Hr) IV Continuous <Continuous>  norepinephrine Infusion 0.05 MICROgram(s)/kG/Min (9.99 mL/Hr) IV Continuous <Continuous>  pantoprazole  Injectable 40 milliGRAM(s) IV Push daily  piperacillin/tazobactam IVPB.. 3.375 Gram(s) IV Intermittent every 8 hours  vasopressin Infusion 0.03 Unit(s)/Min (1.8 mL/Hr) IV Continuous <Continuous>      Allergies    No Known Allergies    Intolerances        SOCIAL HISTORY:  Denies ETOh,Smoking,     FAMILY HISTORY:  No pertinent family history in first degree relatives      REVIEW OF SYSTEMS:    unable to obtained a good review system                          8.0    15.81 )-----------( 228      ( 15 May 2020 00:38 )             25.2       CBC Full  -  ( 15 May 2020 00:38 )  WBC Count : 15.81 K/uL  RBC Count : 2.77 M/uL  Hemoglobin : 8.0 g/dL  Hematocrit : 25.2 %  Platelet Count - Automated : 228 K/uL  Mean Cell Volume : 91.0 fl  Mean Cell Hemoglobin : 28.9 pg  Mean Cell Hemoglobin Concentration : 31.7 gm/dL  Auto Neutrophil # : x  Auto Lymphocyte # : x  Auto Monocyte # : x  Auto Eosinophil # : x  Auto Basophil # : x  Auto Neutrophil % : x  Auto Lymphocyte % : x  Auto Monocyte % : x  Auto Eosinophil % : x  Auto Basophil % : x      05-15    143  |  107  |  75<H>  ----------------------------<  230<H>  3.2<L>   |  19<L>  |  3.12<H>    Ca    8.5      15 May 2020 00:38  Phos  3.3     -15  Mg     2.4     05-15    TPro  5.7<L>  /  Alb  2.0<L>  /  TBili  0.2  /  DBili  x   /  AST  9<L>  /  ALT  8<L>  /  AlkPhos  191<H>  -15      CAPILLARY BLOOD GLUCOSE      POCT Blood Glucose.: 165 mg/dL (15 May 2020 13:08)  POCT Blood Glucose.: 243 mg/dL (15 May 2020 10:16)  POCT Blood Glucose.: 217 mg/dL (15 May 2020 06:11)  POCT Blood Glucose.: 254 mg/dL (15 May 2020 03:17)  POCT Blood Glucose.: 189 mg/dL (14 May 2020 21:22)  POCT Blood Glucose.: 189 mg/dL (14 May 2020 17:06)  POCT Blood Glucose.: 205 mg/dL (14 May 2020 14:21)      Vital Signs Last 24 Hrs  T(C): 36.6 (15 May 2020 11:00), Max: 37.9 (14 May 2020 15:00)  T(F): 97.8 (15 May 2020 11:00), Max: 100.2 (14 May 2020 15:00)  HR: 67 (15 May 2020 14:00) (49 - 120)  BP: 134/60 (14 May 2020 19:00) (134/60 - 134/60)  BP(mean): 87 (14 May 2020 19:00) (87 - 87)  RR: 16 (15 May 2020 14:00) (14 - 39)  SpO2: 100% (15 May 2020 14:00) (90% - 100%)    Urinalysis Basic - ( 13 May 2020 18:19 )    Color: Light Yellow / Appearance: Slightly Turbid / S.018 / pH: x  Gluc: x / Ketone: Trace  / Bili: Small / Urobili: Negative   Blood: x / Protein: 30 mg/dL / Nitrite: Negative   Leuk Esterase: Moderate / RBC: >50 /hpf / WBC 7 /HPF   Sq Epi: x / Non Sq Epi: 3 / Bacteria: Few        PT/INR - ( 15 May 2020 06:18 )   PT: 16.3 sec;   INR: 1.40 ratio         PTT - ( 15 May 2020 06:18 )  PTT:94.2 sec    PHYSICAL EXAM:  intubated   Constitutional: on vent    HEENT: conjunctive   clear   Neck:  No JVD  Respiratory: decrease bs b/l   Cardiovascular: S1 and S2  Gastrointestinal: BS+, soft, NT/ND  Extremities: No peripheral edema  Skin: dry   Access: Not applicable

## 2020-05-15 NOTE — PROGRESS NOTE ADULT - ATTENDING COMMENTS
Ran Tong  Attending Physician   Division of Infectious Disease  Pager #[phone]  After 5pm/weekend or no response, call #[phone]    Please call the ID service [phone] with questions or concerns over the weekend.

## 2020-05-16 NOTE — PROGRESS NOTE ADULT - PROBLEM SELECTOR PLAN 1
CHRONIC KIDNEY DISEASE, STAGE 3: and ACUTE RENAL FAILURE:   hypokalemia will supplemented   Serum creatinine is  improved   , approximating GFR at increased  ml/min.   There is  progression . No uremic symptoms   evidence of anemia .  Fluid status stable.  Will continue to avoid nephrotoxic drugs.  Patient remains asymptomatic.   Continue current therapy.  hold  diuretic.  hold   ACE inhibitor.  hold   ARB.  Additional evaluation:   ECG,    echocardiogram,     CXR,  will obtained recent   renal ultrasound to evalaute kidney size and possible stones ,  continue wit iv fluid.

## 2020-05-16 NOTE — PROGRESS NOTE ADULT - ASSESSMENT
74 year old male with a PMH of CAD s/p CABG, T2DM, HTN, HLD, Prostate Cancer on chemo presenting with bilateral lower extremity weakness concerning for cord compression, found to have metastatic disease, c/b acute hypoxic respiratory failure, s/p intubation and requiring course of pressors, now off pressors.    PLAN:   Neurologic: Sedated  - Precedex   - Dilaudid PRN    Respiratory:   - Intubated  - Mechanical ventilation 450/24/5/40  - Trend ABG  - Failed CPAP trial on 5/15    Cardiovascular:   - now off pressors, continue to monitor BP  - a-line for monitoring  - Trend BNP    Gastrointestinal/Nutrition:   - NPO with tube feeds  - PPI for prophylaxis     Renal/Genitourinary:   #MARJORIE  - Monitor UOP  - Cr up to 3.7, now downtrending, continue to monitor (Cr ~2 at presentation and ~1.2 in 2017)     Hematologic:   - heparin gtt for presumed PE  - Monitor PTT  - Trend INR  - Monitor H/H    Infectious Disease:   - Zosyn for possible aspiration   - Trend Procal - 7.44    Lines/Tubes:  - Arterial line left radial (5/13)  - Central line (5/13)    Endocrine:   - NPH 4u Q6  - ISS for elevated finger sticks 73 yo M w/ hx of CAD s/p CABG, T2DM, HTN, HLD, Prostate Cancer on chemo presenting with bilateral lower extremity weakness concerning for cord compression, found to have metastatic disease, c/b acute hypoxic respiratory failure, possibly 2/2 suspected PE. Now s/p intubation and requiring course of pressors while on sedation.    Neurologic:  - Sedated with Precedex   - Tylenol/Dilaudid PRN for pain    Respiratory:  #Acute hypoxic respiratory failure, possibly 2/2 suspected PE   - Intubated, currently on mechanical ventilation settings 450/20/5/30  - Monitor ABGs  - Failed CPAP trials on 5/15 and 5/16  - When ready, will plan to extubate to BiPAP    Cardiovascular:   #Hx of HTN and CAD s/p CABG  - TTE (5/12): EF 60-65%; minimal MS, mod AS, minimal AR, eccentric LVH; grossly normal LVSF, decreased RVSF, severe pulm HTN  - able to tolerate off pressors while off sedations, but currently requiring levo and vaso gtt while on sedation  - continue to monitor BP  - a-line for monitoring  - pro-BNP elevated to 4459 (on 5/14); f/u repeat pro-BNP in AM  - s/p Lasix 40mg IV x1 on 5/15; x1 on 5/16, will monitor UOP and consider additional Lasix if needed  - holding home PO cardiac meds for now    Gastrointestinal/Nutrition:   - NPO with tube feeds  - PPI 40mg IV daily for prophylaxis     Renal/Genitourinary:   #MARJORIE, improving  - Cr up to 3.7, now downtrending, continue to monitor (Cr ~2 at presentation and ~1.2 in 2017)  - Monitor UOP, consider additional diuresis PRN  - Monitor electrolytes and replete PRN     Hematologic:   #Suspected PE  - c/w heparin gtt  - Hgb downtrending slightly, but no signs of active bleeding  - Continue to monitor CBC, PTT, INR  - b/l LE duplex (5/11): negative for DVTs  - plan for V/Q scan when more stable (possibly tomorrow or Monday)    Infectious Disease:   - monitor off abx, WBC downtrending  - s/p 8-day course of Zosyn (5/8-5/16) for possible aspiration  - Procalcitonin elevated to 7.44 on 5/14; f/u repeat Procalcitonin    Endocrine:   #T2DM  - NPH switched to 8u q6h  - mod ISS  - continue to monitor FS q6h    Lines/Tubes:  - Arterial line left radial (5/13)  - Central line (5/13)

## 2020-05-16 NOTE — PROGRESS NOTE ADULT - SUBJECTIVE AND OBJECTIVE BOX
RUSTY ARANDA 74y [id number]    Patient is a 74y old  Male who presents with a chief complaint of Weakness (16 May 2020 08:24)      Follow Up/CC:  ID following for leukocytosis    Interval History/ROS: on CPAP, intubated, more awake     Allergies    No Known Allergies    Intolerances        ANTIMICROBIALS:  piperacillin/tazobactam IVPB.. 3.375 every 8 hours      MEDICATIONS  (STANDING):  chlorhexidine 0.12% Liquid 15 milliLiter(s) Oral Mucosa every 12 hours  dexMEDEtomidine Infusion 0.1 MICROgram(s)/kG/Hr (2.67 mL/Hr) IV Continuous <Continuous>  heparin  Infusion 1400 Unit(s)/Hr (14 mL/Hr) IV Continuous <Continuous>  insulin lispro (HumaLOG) corrective regimen sliding scale   SubCutaneous every 4 hours  insulin NPH human recombinant 10 Unit(s) SubCutaneous every 8 hours  norepinephrine Infusion 0.05 MICROgram(s)/kG/Min (9.99 mL/Hr) IV Continuous <Continuous>  pantoprazole  Injectable 40 milliGRAM(s) IV Push daily  piperacillin/tazobactam IVPB.. 3.375 Gram(s) IV Intermittent every 8 hours  vasopressin Infusion 0.03 Unit(s)/Min (1.8 mL/Hr) IV Continuous <Continuous>    MEDICATIONS  (PRN):  glucagon  Injectable 1 milliGRAM(s) IntraMuscular once PRN Glucose LESS THAN 70 milligrams/deciliter  HYDROmorphone  Injectable 0.5 milliGRAM(s) IV Push every 3 hours PRN pain        Vital Signs Last 24 Hrs  T(C): 37.7 (16 May 2020 07:00), Max: 37.7 (16 May 2020 07:00)  T(F): 99.8 (16 May 2020 07:00), Max: 99.8 (16 May 2020 07:00)  HR: 95 (16 May 2020 09:15) (50 - 120)  BP: 93/53 (15 May 2020 19:00) (93/53 - 93/53)  BP(mean): 68 (15 May 2020 19:00) (68 - 68)  RR: 33 (16 May 2020 09:15) (9 - 39)  SpO2: 100% (16 May 2020 09:15) (90% - 100%)    CBC Full  -  ( 16 May 2020 00:24 )  WBC Count : 11.90 K/uL  RBC Count : 2.85 M/uL  Hemoglobin : 8.2 g/dL  Hematocrit : 26.3 %  Platelet Count - Automated : 228 K/uL  Mean Cell Volume : 92.3 fl  Mean Cell Hemoglobin : 28.8 pg  Mean Cell Hemoglobin Concentration : 31.2 gm/dL  Auto Neutrophil # : x  Auto Lymphocyte # : x  Auto Monocyte # : x  Auto Eosinophil # : x  Auto Basophil # : x  Auto Neutrophil % : x  Auto Lymphocyte % : x  Auto Monocyte % : x  Auto Eosinophil % : x  Auto Basophil % : x    05-16    146<H>  |  110<H>  |  68<H>  ----------------------------<  309<H>  3.4<L>   |  19<L>  |  2.38<H>    Ca    8.8      16 May 2020 00:24  Phos  2.5     05-16  Mg     2.2     05-16    TPro  6.2  /  Alb  2.1<L>  /  TBili  0.1<L>  /  DBili  x   /  AST  11  /  ALT  8<L>  /  AlkPhos  171<H>  05-16    LIVER FUNCTIONS - ( 16 May 2020 00:24 )  Alb: 2.1 g/dL / Pro: 6.2 g/dL / ALK PHOS: 171 U/L / ALT: 8 U/L / AST: 11 U/L / GGT: x           Procalcitonin, Serum: 7.44 (05-14)  Procalcitonin, Serum: 0.80 (05-13)  Procalcitonin, Serum: 0.55 (05-11)    C-Reactive Protein, Serum: 23.72 (05-13)  C-Reactive Protein, Serum: 28.76 (05-11)    Ferritin, Serum: 321 (05-14)  Ferritin, Serum: 324 (05-11)      D-Dimer Assay, Quantitative: 930 (05-13)  D-Dimer Assay, Quantitative: 1274 (05-12)  D-Dimer Assay, Quantitative: 925 (05-11)        MICROBIOLOGY:  .Sputum Sputum  05-14-20   No growth to date.  --    Numerous polymorphonuclear leukocytes per low power field  Few Squamous epithelial cells per low power field  No organisms seen per oil power field      .Blood Blood-Peripheral  05-13-20   No growth to date.  --  --      .Blood Blood-Peripheral  05-13-20   No growth to date.  --  --      .Blood Blood-Peripheral  05-08-20   No Growth Final  --  --      .Urine Clean Catch (Midstream)  05-08-20   No growth  --  --      COVID-19 PCR . (05.15.20 @ 15:54)    COVID-19 PCR: NotDetec: This test has been validated by [facility] to be accurate;  though it has not been FDA cleared/approved by the usual pathway.  As with all laboratory tests, results should be correlated with clinical  findings.  https://www.fda.gov/media/843465/download  https://www.fda.gov/media/195827/download          RADIOLOGY  < from: Xray Chest 1 View- PORTABLE-Routine (05.14.20 @ 07:15) >  The mediastinal cardiac silhouette is unremarkable. Sternotomy. ET tube between clavicles and jony. Right IJ line in superior vena cava. Enteric tube in stomach.    Low lung volumes and rotation limit evaluation. Mild bibasilar opacities unchanged.     No acute osseous finding.       < end of copied text >

## 2020-05-16 NOTE — PROGRESS NOTE ADULT - ATTENDING COMMENTS
Ran Tong  Attending Physician   Division of Infectious Disease  Pager #[phone]  After 5pm/weekend or no response, call #[phone]    Please call the ID service [phone] with questions or concerns over the weekend.

## 2020-05-16 NOTE — PROGRESS NOTE ADULT - SUBJECTIVE AND OBJECTIVE BOX
Patient is a 74y Male whom presented to the hospital with ckd and zehra     PAST MEDICAL & SURGICAL HISTORY:  HTN (hypertension)  Prostate cancer  Hyperlipidemia  Neuropathy  Diabetes mellitus, type 2  CAD (coronary artery disease)  History of cholecystectomy  H/O coronary artery bypass surgery      MEDICATIONS  (STANDING):  calcium gluconate IVPB 1 Gram(s) IV Intermittent once  chlorhexidine 0.12% Liquid 15 milliLiter(s) Oral Mucosa every 12 hours  chlorhexidine 4% Liquid 1 Application(s) Topical <User Schedule>  chlorhexidine 4% Liquid 1 Application(s) Topical <User Schedule>  dexMEDEtomidine Infusion 0.1 MICROgram(s)/kG/Hr (2.67 mL/Hr) IV Continuous <Continuous>  dextrose 50% Injectable 50 milliLiter(s) IV Push once  heparin  Infusion.  Unit(s)/Hr (19 mL/Hr) IV Continuous <Continuous>  insulin lispro (HumaLOG) corrective regimen sliding scale   SubCutaneous every 4 hours  insulin regular  human recombinant. 10 Unit(s) IV Push once  lactated ringers. 1000 milliLiter(s) (100 mL/Hr) IV Continuous <Continuous>  norepinephrine Infusion 0.05 MICROgram(s)/kG/Min (9.99 mL/Hr) IV Continuous <Continuous>  pantoprazole  Injectable 40 milliGRAM(s) IV Push daily  piperacillin/tazobactam IVPB.. 3.375 Gram(s) IV Intermittent every 8 hours  vasopressin Infusion 0.03 Unit(s)/Min (1.8 mL/Hr) IV Continuous <Continuous>      Allergies    No Known Allergies    Intolerances        SOCIAL HISTORY:  Denies ETOh,Smoking,     FAMILY HISTORY:  No pertinent family history in first degree relatives      REVIEW OF SYSTEMS:    unable to obtained a good review system                                              8.2    11.90 )-----------( 228      ( 16 May 2020 00:24 )             26.3       CBC Full  -  ( 16 May 2020 00:24 )  WBC Count : 11.90 K/uL  RBC Count : 2.85 M/uL  Hemoglobin : 8.2 g/dL  Hematocrit : 26.3 %  Platelet Count - Automated : 228 K/uL  Mean Cell Volume : 92.3 fl  Mean Cell Hemoglobin : 28.8 pg  Mean Cell Hemoglobin Concentration : 31.2 gm/dL  Auto Neutrophil # : x  Auto Lymphocyte # : x  Auto Monocyte # : x  Auto Eosinophil # : x  Auto Basophil # : x  Auto Neutrophil % : x  Auto Lymphocyte % : x  Auto Monocyte % : x  Auto Eosinophil % : x  Auto Basophil % : x      05-16    149<H>  |  113<H>  |  62<H>  ----------------------------<  260<H>  2.9<LL>   |  22  |  1.99<H>    Ca    8.8      16 May 2020 18:33  Phos  2.3     05-16  Mg     2.1     05-16    TPro  6.2  /  Alb  2.1<L>  /  TBili  0.1<L>  /  DBili  x   /  AST  11  /  ALT  8<L>  /  AlkPhos  171<H>  05-16      CAPILLARY BLOOD GLUCOSE      POCT Blood Glucose.: 201 mg/dL (16 May 2020 17:13)  POCT Blood Glucose.: 186 mg/dL (16 May 2020 13:09)  POCT Blood Glucose.: 278 mg/dL (16 May 2020 09:13)  POCT Blood Glucose.: 295 mg/dL (16 May 2020 05:11)  POCT Blood Glucose.: 342 mg/dL (16 May 2020 03:01)  POCT Blood Glucose.: 258 mg/dL (15 May 2020 22:06)      Vital Signs Last 24 Hrs  T(C): 37.1 (16 May 2020 15:00), Max: 37.7 (16 May 2020 07:00)  T(F): 98.8 (16 May 2020 15:00), Max: 99.8 (16 May 2020 07:00)  HR: 58 (16 May 2020 19:00) (50 - 106)  BP: --  BP(mean): --  RR: 20 (16 May 2020 19:00) (20 - 38)  SpO2: 98% (16 May 2020 19:00) (96% - 100%)        PT/INR - ( 16 May 2020 00:24 )   PT: 14.3 sec;   INR: 1.25 ratio         PTT - ( 16 May 2020 00:24 )  PTT:68.0 sec  PHYSICAL EXAM:  intubated   Constitutional: on vent    HEENT: conjunctive   clear   Neck:  No JVD  Respiratory: decrease bs b/l   Cardiovascular: S1 and S2  Gastrointestinal: BS+, soft, NT/ND  Extremities: No peripheral edema  Skin: dry   Access: Not applicable

## 2020-05-16 NOTE — PROGRESS NOTE ADULT - SUBJECTIVE AND OBJECTIVE BOX
CHIEF COMPLAINT:    OVERNIGHT / SUBJECTIVE:  - Overnight, Trop downtrending to 80, increased NPH to 10u q8h, FS improved to 200s, RR decreased to 20; Hep and Zosyn changed from D5 to NS solutions. Weaned off pressors.    REVIEW OF SYSTEMS:  Unable to obtain, pt is intubated and sedated    OBJECTIVE:  ICU Vital Signs Last 24 Hrs  T(C): 37.6 (16 May 2020 03:00), Max: 37.6 (16 May 2020 03:00)  T(F): 99.7 (16 May 2020 03:00), Max: 99.7 (16 May 2020 03:00)  HR: 96 (16 May 2020 07:15) (50 - 120)  BP: 93/53 (15 May 2020 19:00) (93/53 - 93/53)  BP(mean): 68 (15 May 2020 19:00) (68 - 68)  ABP: 126/46 (16 May 2020 07:15) (101/30 - 169/60)  ABP(mean): 73 (16 May 2020 07:15) (51 - 106)  RR: 32 (16 May 2020 07:15) (9 - 39)  SpO2: 99% (16 May 2020 07:15) (90% - 100%)    Mode: AC/ CMV (Assist Control/ Continuous Mandatory Ventilation), RR (machine): 20, TV (machine): 450, FiO2: 30, PEEP: 5, ITime: 1, MAP: 11, PIP: 31    05-15 @ 07:01  -  05-16 @ 07:00  --------------------------------------------------------  IN: 2454.5 mL / OUT: 2500 mL / NET: -45.5 mL      CAPILLARY BLOOD GLUCOSE      POCT Blood Glucose.: 295 mg/dL (16 May 2020 05:11)  POCT Blood Glucose.: 342 mg/dL (16 May 2020 03:01)  POCT Blood Glucose.: 258 mg/dL (15 May 2020 22:06)  POCT Blood Glucose.: 209 mg/dL (15 May 2020 17:13)  POCT Blood Glucose.: 165 mg/dL (15 May 2020 13:08)  POCT Blood Glucose.: 243 mg/dL (15 May 2020 10:16)    I&O's Summary    15 May 2020 07:01  -  16 May 2020 07:00  --------------------------------------------------------  IN: 2454.5 mL / OUT: 2500 mL / NET: -45.5 mL        PHYSICAL EXAM  GENERAL: NAD, intubated, appearing comfortable  NEURO: sedated  HEENT: NG tube in place  CHEST/LUNG: b/l vent sounds  HEART: Regular rate and rhythm; No murmurs, rubs, or gallops  ABDOMEN: Soft, Nontender, Nondistended; Bowel sounds present  EXTREMITIES:  2+ Peripheral Pulses, No clubbing, cyanosis, or edema  SKIN: No obvious rashes or lesions    LABS:                        8.2    11.90 )-----------( 228      ( 16 May 2020 00:24 )             26.3     05-16    146<H>  |  110<H>  |  68<H>  ----------------------------<  309<H>  3.4<L>   |  19<L>  |  2.38<H>    Ca    8.8      16 May 2020 00:24  Phos  2.5     05-16  Mg     2.2     05-16    TPro  6.2  /  Alb  2.1<L>  /  TBili  0.1<L>  /  DBili  x   /  AST  11  /  ALT  8<L>  /  AlkPhos  171<H>  05-16        LINES:    HOSPITAL MEDICATIONS:  Standing Meds:  chlorhexidine 0.12% Liquid 15 milliLiter(s) Oral Mucosa every 12 hours  dexMEDEtomidine Infusion 0.1 MICROgram(s)/kG/Hr IV Continuous <Continuous>  heparin  Infusion 1400 Unit(s)/Hr IV Continuous <Continuous>  insulin lispro (HumaLOG) corrective regimen sliding scale   SubCutaneous every 4 hours  insulin NPH human recombinant 10 Unit(s) SubCutaneous every 8 hours  norepinephrine Infusion 0.05 MICROgram(s)/kG/Min IV Continuous <Continuous>  pantoprazole  Injectable 40 milliGRAM(s) IV Push daily  piperacillin/tazobactam IVPB.. 3.375 Gram(s) IV Intermittent every 8 hours  vasopressin Infusion 0.03 Unit(s)/Min IV Continuous <Continuous>      PRN Meds:  glucagon  Injectable 1 milliGRAM(s) IntraMuscular once PRN  HYDROmorphone  Injectable 0.5 milliGRAM(s) IV Push every 3 hours PRN      LABS:                        8.2    11.90 )-----------( 228      ( 16 May 2020 00:24 )             26.3     Hgb Trend: 8.2<--, 8.0<--, 9.0<--, 10.1<--, 11.0<--  05-16    146<H>  |  110<H>  |  68<H>  ----------------------------<  309<H>  3.4<L>   |  19<L>  |  2.38<H>    Ca    8.8      16 May 2020 00:24  Phos  2.5     05-16  Mg     2.2     05-16    TPro  6.2  /  Alb  2.1<L>  /  TBili  0.1<L>  /  DBili  x   /  AST  11  /  ALT  8<L>  /  AlkPhos  171<H>  05-16    Creatinine Trend: 2.38<--, 2.52<--, 3.12<--, 3.44<--, 3.73<--, 3.72<--  PT/INR - ( 16 May 2020 00:24 )   PT: 14.3 sec;   INR: 1.25 ratio         PTT - ( 16 May 2020 00:24 )  PTT:68.0 sec    Arterial Blood Gas:  05-16 @ 00:17  7.45/31/118/21/99/-1.8  ABG lactate: --  Arterial Blood Gas:  05-15 @ 10:52  7.36/38/66/21/92/-3.6  ABG lactate: --  Arterial Blood Gas:  05-15 @ 00:31  7.41/35/99/22/98/-2.2  ABG lactate: --  Arterial Blood Gas:  05-14 @ 18:32  7.48/28/123/21/99/-2.0  ABG lactate: --  Arterial Blood Gas:  05-14 @ 12:47  7.46/29/131/21/99/-2.1  ABG lactate: --    Venous Blood Gas:  05-16 @ 00:17  7.39/39/34/23/58  VBG Lactate: 1.3  Venous Blood Gas:  05-15 @ 00:31  7.37/40/36/23/63  VBG Lactate: 1.2    PT/INR - ( 16 May 2020 00:24 )   PT: 14.3 sec;   INR: 1.25 ratio         PTT - ( 16 May 2020 00:24 )  PTT:68.0 sec          MICROBIOLOGY:     RADIOLOGY: Reviewed CHIEF COMPLAINT:    OVERNIGHT / SUBJECTIVE:  - Overnight, Trop downtrending to 80, increased NPH to 10u q8h, FS improved to 200s, RR decreased to 20; Hep and Zosyn changed from D5 to NS solutions. Weaned off pressors.  - Today, pt seen and examined at bedside in AM. Pt was CPAPing, noticed to be tachypneic to 30s. Asked pt if he was in pain, and pt nodded as if to indicated "yes." Pt indicated pain was everywhere. IV tylenol x1 given with improvement as per nurse. However, shortly after, pt became tachypneic again and was agitated shaking side rail of bed. Pt was put back on vent and sedation; pressors restarted as BP dropped after sedating.    REVIEW OF SYSTEMS:  Unable to obtain full ROS as pt is intubated    OBJECTIVE:  ICU Vital Signs Last 24 Hrs  T(C): 37.6 (16 May 2020 03:00), Max: 37.6 (16 May 2020 03:00)  T(F): 99.7 (16 May 2020 03:00), Max: 99.7 (16 May 2020 03:00)  HR: 96 (16 May 2020 07:15) (50 - 120)  BP: 93/53 (15 May 2020 19:00) (93/53 - 93/53)  BP(mean): 68 (15 May 2020 19:00) (68 - 68)  ABP: 126/46 (16 May 2020 07:15) (101/30 - 169/60)  ABP(mean): 73 (16 May 2020 07:15) (51 - 106)  RR: 32 (16 May 2020 07:15) (9 - 39)  SpO2: 99% (16 May 2020 07:15) (90% - 100%)    Mode: AC/ CMV (Assist Control/ Continuous Mandatory Ventilation), RR (machine): 20, TV (machine): 450, FiO2: 30, PEEP: 5, ITime: 1, MAP: 11, PIP: 31    05-15 @ 07:01  -  05-16 @ 07:00  --------------------------------------------------------  IN: 2454.5 mL / OUT: 2500 mL / NET: -45.5 mL      CAPILLARY BLOOD GLUCOSE      POCT Blood Glucose.: 295 mg/dL (16 May 2020 05:11)  POCT Blood Glucose.: 342 mg/dL (16 May 2020 03:01)  POCT Blood Glucose.: 258 mg/dL (15 May 2020 22:06)  POCT Blood Glucose.: 209 mg/dL (15 May 2020 17:13)  POCT Blood Glucose.: 165 mg/dL (15 May 2020 13:08)  POCT Blood Glucose.: 243 mg/dL (15 May 2020 10:16)    I&O's Summary    15 May 2020 07:01  -  16 May 2020 07:00  --------------------------------------------------------  IN: 2454.5 mL / OUT: 2500 mL / NET: -45.5 mL        PHYSICAL EXAM  GENERAL: NAD, intubated, CPAPing  NEURO: unable to assess, but responding to questions  HEENT: conjunctiva and sclera clear  CHEST/LUNG: tachypneic, b/l vent sounds  HEART: Mildly tachycardic, regular rhythm; No murmurs, rubs, or gallops  ABDOMEN: Soft, Nontender, Nondistended; Bowel sounds present  EXTREMITIES:  2+ Peripheral Pulses, No clubbing or cyanosis; 2+ pitting edema in b/l UEs and LEs  SKIN: No new rashes or lesions    LABS:                        8.2    11.90 )-----------( 228      ( 16 May 2020 00:24 )             26.3     05-16    146<H>  |  110<H>  |  68<H>  ----------------------------<  309<H>  3.4<L>   |  19<L>  |  2.38<H>    Ca    8.8      16 May 2020 00:24  Phos  2.5     05-16  Mg     2.2     05-16    TPro  6.2  /  Alb  2.1<L>  /  TBili  0.1<L>  /  DBili  x   /  AST  11  /  ALT  8<L>  /  AlkPhos  171<H>  05-16        LINES:    HOSPITAL MEDICATIONS:  Standing Meds:  chlorhexidine 0.12% Liquid 15 milliLiter(s) Oral Mucosa every 12 hours  dexMEDEtomidine Infusion 0.1 MICROgram(s)/kG/Hr IV Continuous <Continuous>  heparin  Infusion 1400 Unit(s)/Hr IV Continuous <Continuous>  insulin lispro (HumaLOG) corrective regimen sliding scale   SubCutaneous every 4 hours  insulin NPH human recombinant 10 Unit(s) SubCutaneous every 8 hours  norepinephrine Infusion 0.05 MICROgram(s)/kG/Min IV Continuous <Continuous>  pantoprazole  Injectable 40 milliGRAM(s) IV Push daily  piperacillin/tazobactam IVPB.. 3.375 Gram(s) IV Intermittent every 8 hours  vasopressin Infusion 0.03 Unit(s)/Min IV Continuous <Continuous>      PRN Meds:  glucagon  Injectable 1 milliGRAM(s) IntraMuscular once PRN  HYDROmorphone  Injectable 0.5 milliGRAM(s) IV Push every 3 hours PRN      LABS:                        8.2    11.90 )-----------( 228      ( 16 May 2020 00:24 )             26.3     Hgb Trend: 8.2<--, 8.0<--, 9.0<--, 10.1<--, 11.0<--  05-16    146<H>  |  110<H>  |  68<H>  ----------------------------<  309<H>  3.4<L>   |  19<L>  |  2.38<H>    Ca    8.8      16 May 2020 00:24  Phos  2.5     05-16  Mg     2.2     05-16    TPro  6.2  /  Alb  2.1<L>  /  TBili  0.1<L>  /  DBili  x   /  AST  11  /  ALT  8<L>  /  AlkPhos  171<H>  05-16    Creatinine Trend: 2.38<--, 2.52<--, 3.12<--, 3.44<--, 3.73<--, 3.72<--  PT/INR - ( 16 May 2020 00:24 )   PT: 14.3 sec;   INR: 1.25 ratio         PTT - ( 16 May 2020 00:24 )  PTT:68.0 sec    Arterial Blood Gas:  05-16 @ 00:17  7.45/31/118/21/99/-1.8  ABG lactate: --  Arterial Blood Gas:  05-15 @ 10:52  7.36/38/66/21/92/-3.6  ABG lactate: --  Arterial Blood Gas:  05-15 @ 00:31  7.41/35/99/22/98/-2.2  ABG lactate: --  Arterial Blood Gas:  05-14 @ 18:32  7.48/28/123/21/99/-2.0  ABG lactate: --  Arterial Blood Gas:  05-14 @ 12:47  7.46/29/131/21/99/-2.1  ABG lactate: --    Venous Blood Gas:  05-16 @ 00:17  7.39/39/34/23/58  VBG Lactate: 1.3  Venous Blood Gas:  05-15 @ 00:31  7.37/40/36/23/63  VBG Lactate: 1.2    PT/INR - ( 16 May 2020 00:24 )   PT: 14.3 sec;   INR: 1.25 ratio         PTT - ( 16 May 2020 00:24 )  PTT:68.0 sec          MICROBIOLOGY:     RADIOLOGY: Reviewed

## 2020-05-16 NOTE — PROGRESS NOTE ADULT - ATTENDING COMMENTS
Pt seen and examined. 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 Minimal oxygen requirements on the mechanical ventilation. Tolerated PS trial for 15 mins today before developing increased work of breathing today. Cont daily weaning trials as tolerated. Afebrile, leukocytosis improving, BCxs from 5/13 NGTD. Cont IV zosyn x 5 days, FUP repeat serum procalcitonin level. Titrate pressors to keep MAP > 65. Cr trending down, serum proBNP elevated, cont diuresis with Lasix as tolerated to optimize volume status. TTE showing severe pulm HTN which maybe secondary to untreated SHYANN, currently on AC with a heparin gtt for possible PE. Will require a V/Q scan for diagnosis. Overall prognosis guarded.

## 2020-05-17 NOTE — PROGRESS NOTE ADULT - SUBJECTIVE AND OBJECTIVE BOX
Patient is a 74y Male whom presented to the hospital with ckd and zehra     PAST MEDICAL & SURGICAL HISTORY:  HTN (hypertension)  Prostate cancer  Hyperlipidemia  Neuropathy  Diabetes mellitus, type 2  CAD (coronary artery disease)  History of cholecystectomy  H/O coronary artery bypass surgery      MEDICATIONS  (STANDING):  calcium gluconate IVPB 1 Gram(s) IV Intermittent once  chlorhexidine 0.12% Liquid 15 milliLiter(s) Oral Mucosa every 12 hours  chlorhexidine 4% Liquid 1 Application(s) Topical <User Schedule>  chlorhexidine 4% Liquid 1 Application(s) Topical <User Schedule>  dexMEDEtomidine Infusion 0.1 MICROgram(s)/kG/Hr (2.67 mL/Hr) IV Continuous <Continuous>  dextrose 50% Injectable 50 milliLiter(s) IV Push once  heparin  Infusion.  Unit(s)/Hr (19 mL/Hr) IV Continuous <Continuous>  insulin lispro (HumaLOG) corrective regimen sliding scale   SubCutaneous every 4 hours  insulin regular  human recombinant. 10 Unit(s) IV Push once  lactated ringers. 1000 milliLiter(s) (100 mL/Hr) IV Continuous <Continuous>  norepinephrine Infusion 0.05 MICROgram(s)/kG/Min (9.99 mL/Hr) IV Continuous <Continuous>  pantoprazole  Injectable 40 milliGRAM(s) IV Push daily  piperacillin/tazobactam IVPB.. 3.375 Gram(s) IV Intermittent every 8 hours  vasopressin Infusion 0.03 Unit(s)/Min (1.8 mL/Hr) IV Continuous <Continuous>      Allergies    No Known Allergies    Intolerances        SOCIAL HISTORY:  Denies ETOh,Smoking,     FAMILY HISTORY:  No pertinent family history in first degree relatives      REVIEW OF SYSTEMS:    unable to obtained a good review system                                                               7.9    12.23 )-----------( 208      ( 16 May 2020 23:35 )             25.8       CBC Full  -  ( 16 May 2020 23:35 )  WBC Count : 12.23 K/uL  RBC Count : 2.73 M/uL  Hemoglobin : 7.9 g/dL  Hematocrit : 25.8 %  Platelet Count - Automated : 208 K/uL  Mean Cell Volume : 94.5 fl  Mean Cell Hemoglobin : 28.9 pg  Mean Cell Hemoglobin Concentration : 30.6 gm/dL  Auto Neutrophil # : x  Auto Lymphocyte # : x  Auto Monocyte # : x  Auto Eosinophil # : x  Auto Basophil # : x  Auto Neutrophil % : x  Auto Lymphocyte % : x  Auto Monocyte % : x  Auto Eosinophil % : x  Auto Basophil % : x      05-16    149<H>  |  115<H>  |  61<H>  ----------------------------<  278<H>  4.5   |  21<L>  |  1.96<H>    Ca    8.5      16 May 2020 23:35  Phos  3.7     05-16  Mg     2.1     05-16    TPro  5.9<L>  /  Alb  1.8<L>  /  TBili  0.1<L>  /  DBili  x   /  AST  12  /  ALT  9<L>  /  AlkPhos  149<H>  05-16      CAPILLARY BLOOD GLUCOSE      POCT Blood Glucose.: 232 mg/dL (17 May 2020 14:19)  POCT Blood Glucose.: 329 mg/dL (17 May 2020 09:07)  POCT Blood Glucose.: 303 mg/dL (17 May 2020 05:08)  POCT Blood Glucose.: 301 mg/dL (17 May 2020 01:58)  POCT Blood Glucose.: 207 mg/dL (16 May 2020 22:02)      Vital Signs Last 24 Hrs  T(C): 37.9 (17 May 2020 15:00), Max: 37.9 (17 May 2020 11:00)  T(F): 100.3 (17 May 2020 15:00), Max: 100.3 (17 May 2020 15:00)  HR: 66 (17 May 2020 17:45) (57 - 91)  BP: 151/120 (16 May 2020 20:00) (151/120 - 151/120)  BP(mean): 132 (16 May 2020 20:00) (132 - 132)  RR: 24 (17 May 2020 17:45) (20 - 36)  SpO2: 99% (17 May 2020 17:45) (95% - 100%)        PT/INR - ( 16 May 2020 23:35 )   PT: 14.1 sec;   INR: 1.22 ratio         PTT - ( 17 May 2020 17:02 )  PTT:76.3 sec     PTT - ( 16 May 2020 00:24 )  PTT:68.0 sec  PHYSICAL EXAM:  intubated   Constitutional: on vent    HEENT: conjunctive   clear   Neck:  No JVD  Respiratory: decrease bs b/l   Cardiovascular: S1 and S2  Gastrointestinal: BS+, soft, NT/ND  Extremities: No peripheral edema  Skin: dry   Access: Not applicable

## 2020-05-17 NOTE — PROGRESS NOTE ADULT - SUBJECTIVE AND OBJECTIVE BOX
INTERVAL HPI/OVERNIGHT EVENTS:  - Heparin drip increased from 14 to 15  ...........    SUBJECTIVE: Patient seen and examined at bedside.       VITAL SIGNS:  ICU Vital Signs Last 24 Hrs  T(C): 37.9 (17 May 2020 15:00), Max: 37.9 (17 May 2020 11:00)  T(F): 100.3 (17 May 2020 15:00), Max: 100.3 (17 May 2020 15:00)  HR: 67 (17 May 2020 15:27) (57 - 91)  BP: 151/120 (16 May 2020 20:00) (151/120 - 151/120)  BP(mean): 132 (16 May 2020 20:00) (132 - 132)  ABP: 142/51 (17 May 2020 15:00) (103/36 - 174/61)  ABP(mean): 83 (17 May 2020 15:00) (56 - 107)  RR: 36 (17 May 2020 15:00) (20 - 36)  SpO2: 99% (17 May 2020 15:27) (95% - 100%)    Mode: CPAP with PS, FiO2: 30, PEEP: 5, PS: 12, MAP: 9, PIP: 18  Plateau pressure:   P/F ratio:     05-16 @ 07:01  -  05-17 @ 07:00  --------------------------------------------------------  IN: 3539.9 mL / OUT: 2730 mL / NET: 809.9 mL    05-17 @ 07:01  -  05-17 @ 15:54  --------------------------------------------------------  IN: 247.6 mL / OUT: 325 mL / NET: -77.4 mL      CAPILLARY BLOOD GLUCOSE      POCT Blood Glucose.: 232 mg/dL (17 May 2020 14:19)    ECG:    PHYSICAL EXAM:    General:   HEENT:   Neck:   Respiratory:   Cardiovascular:   Abdomen:   Extremities:  Neurological:    MEDICATIONS:  MEDICATIONS  (STANDING):  chlorhexidine 0.12% Liquid 15 milliLiter(s) Oral Mucosa every 12 hours  dexMEDEtomidine Infusion 0.1 MICROgram(s)/kG/Hr (2.67 mL/Hr) IV Continuous <Continuous>  epoetin-sandeep-epbx (RETACRIT) Injectable 99531 Unit(s) SubCutaneous <User Schedule>  heparin  Infusion 1400 Unit(s)/Hr (15 mL/Hr) IV Continuous <Continuous>  insulin lispro (HumaLOG) corrective regimen sliding scale   SubCutaneous every 4 hours  insulin NPH human recombinant 12 Unit(s) SubCutaneous every 6 hours  norepinephrine Infusion 0.05 MICROgram(s)/kG/Min (9.99 mL/Hr) IV Continuous <Continuous>  pantoprazole  Injectable 40 milliGRAM(s) IV Push daily  vasopressin Infusion 0.03 Unit(s)/Min (1.8 mL/Hr) IV Continuous <Continuous>    MEDICATIONS  (PRN):  glucagon  Injectable 1 milliGRAM(s) IntraMuscular once PRN Glucose LESS THAN 70 milligrams/deciliter  HYDROmorphone  Injectable 0.5 milliGRAM(s) IV Push every 3 hours PRN pain      ALLERGIES:  Allergies    No Known Allergies    Intolerances        LABS:                        7.9    12.23 )-----------( 208      ( 16 May 2020 23:35 )             25.8     05-16    149<H>  |  115<H>  |  61<H>  ----------------------------<  278<H>  4.5   |  21<L>  |  1.96<H>    Ca    8.5      16 May 2020 23:35  Phos  3.7     05-16  Mg     2.1     05-16    TPro  5.9<L>  /  Alb  1.8<L>  /  TBili  0.1<L>  /  DBili  x   /  AST  12  /  ALT  9<L>  /  AlkPhos  149<H>  05-16    PT/INR - ( 16 May 2020 23:35 )   PT: 14.1 sec;   INR: 1.22 ratio         PTT - ( 17 May 2020 11:10 )  PTT:67.6 sec      RADIOLOGY & ADDITIONAL TESTS: Reviewed. INTERVAL HPI/OVERNIGHT EVENTS:  - Heparin drip increased from 14 to 15  - Low grade fever   - NPH increased to 12 units q6  - Completed Zosyn 5/16    SUBJECTIVE: Patient seen and examined at bedside.       VITAL SIGNS:  ICU Vital Signs Last 24 Hrs  T(C): 37.9 (17 May 2020 15:00), Max: 37.9 (17 May 2020 11:00)  T(F): 100.3 (17 May 2020 15:00), Max: 100.3 (17 May 2020 15:00)  HR: 67 (17 May 2020 15:27) (57 - 91)  BP: 151/120 (16 May 2020 20:00) (151/120 - 151/120)  BP(mean): 132 (16 May 2020 20:00) (132 - 132)  ABP: 142/51 (17 May 2020 15:00) (103/36 - 174/61)  ABP(mean): 83 (17 May 2020 15:00) (56 - 107)  RR: 36 (17 May 2020 15:00) (20 - 36)  SpO2: 99% (17 May 2020 15:27) (95% - 100%)    Mode: CPAP with PS, FiO2: 30, PEEP: 5, PS: 12, MAP: 9, PIP: 18  Plateau pressure:   P/F ratio:     05-16 @ 07:01  -  05-17 @ 07:00  --------------------------------------------------------  IN: 3539.9 mL / OUT: 2730 mL / NET: 809.9 mL    05-17 @ 07:01  -  05-17 @ 15:54  --------------------------------------------------------  IN: 247.6 mL / OUT: 325 mL / NET: -77.4 mL      CAPILLARY BLOOD GLUCOSE      POCT Blood Glucose.: 232 mg/dL (17 May 2020 14:19)    ECG:    PHYSICAL EXAM:  Gen: sedated and intubated, looks comfortable  Neuro: sedated, looks comfortable  HEENT: NG tube in place  Resp: intubated, breathing with the vent      MEDICATIONS:  MEDICATIONS  (STANDING):  chlorhexidine 0.12% Liquid 15 milliLiter(s) Oral Mucosa every 12 hours  dexMEDEtomidine Infusion 0.1 MICROgram(s)/kG/Hr (2.67 mL/Hr) IV Continuous <Continuous>  epoetin-sandeep-epbx (RETACRIT) Injectable 08078 Unit(s) SubCutaneous <User Schedule>  heparin  Infusion 1400 Unit(s)/Hr (15 mL/Hr) IV Continuous <Continuous>  insulin lispro (HumaLOG) corrective regimen sliding scale   SubCutaneous every 4 hours  insulin NPH human recombinant 12 Unit(s) SubCutaneous every 6 hours  norepinephrine Infusion 0.05 MICROgram(s)/kG/Min (9.99 mL/Hr) IV Continuous <Continuous>  pantoprazole  Injectable 40 milliGRAM(s) IV Push daily  vasopressin Infusion 0.03 Unit(s)/Min (1.8 mL/Hr) IV Continuous <Continuous>    MEDICATIONS  (PRN):  glucagon  Injectable 1 milliGRAM(s) IntraMuscular once PRN Glucose LESS THAN 70 milligrams/deciliter  HYDROmorphone  Injectable 0.5 milliGRAM(s) IV Push every 3 hours PRN pain      ALLERGIES:  Allergies    No Known Allergies    Intolerances        LABS:                        7.9    12.23 )-----------( 208      ( 16 May 2020 23:35 )             25.8     05-16    149<H>  |  115<H>  |  61<H>  ----------------------------<  278<H>  4.5   |  21<L>  |  1.96<H>    Ca    8.5      16 May 2020 23:35  Phos  3.7     05-16  Mg     2.1     05-16    TPro  5.9<L>  /  Alb  1.8<L>  /  TBili  0.1<L>  /  DBili  x   /  AST  12  /  ALT  9<L>  /  AlkPhos  149<H>  05-16    PT/INR - ( 16 May 2020 23:35 )   PT: 14.1 sec;   INR: 1.22 ratio         PTT - ( 17 May 2020 11:10 )  PTT:67.6 sec      RADIOLOGY & ADDITIONAL TESTS: Reviewed.

## 2020-05-17 NOTE — PROGRESS NOTE ADULT - ASSESSMENT
73 yo M w/ hx of CAD s/p CABG, T2DM, HTN, HLD, Prostate Cancer on chemo presenting with bilateral lower extremity weakness concerning for cord compression, found to have metastatic disease, c/b acute hypoxic respiratory failure, possibly 2/2 suspected PE. Now s/p intubation and requiring course of pressors while on sedation.    Neurologic:  - Sedated with Precedex   - Tylenol/Dilaudid PRN for pain    Respiratory:  #Acute hypoxic respiratory failure, possibly 2/2 suspected PE   - Intubated, currently on mechanical ventilation settings 450/20/5/30  - Monitor ABGs  - Failed CPAP trials on 5/15 and 5/16  - When ready, will plan to extubate to BiPAP    Cardiovascular:   #Hx of HTN and CAD s/p CABG  - TTE (5/12): EF 60-65%; minimal MS, mod AS, minimal AR, eccentric LVH; grossly normal LVSF, decreased RVSF, severe pulm HTN  - able to tolerate off pressors while off sedations, but currently requiring levo and vaso gtt while on sedation  - continue to monitor BP  - a-line for monitoring  - pro-BNP elevated to 4459 (on 5/14); f/u repeat pro-BNP in AM  - s/p Lasix 40mg IV x1 on 5/15; x1 on 5/16, will monitor UOP and consider additional Lasix if needed  - holding home PO cardiac meds for now    Gastrointestinal/Nutrition:   - NPO with tube feeds  - PPI 40mg IV daily for prophylaxis     Renal/Genitourinary:   #MARJORIE, improving  - Cr up to 3.7, now downtrending, continue to monitor (Cr ~2 at presentation and ~1.2 in 2017)  - Monitor UOP, consider additional diuresis PRN  - Monitor electrolytes and replete PRN     Hematologic:   #Suspected PE  - c/w heparin gtt  - Hgb downtrending slightly, but no signs of active bleeding  - Continue to monitor CBC, PTT, INR  - b/l LE duplex (5/11): negative for DVTs  - plan for V/Q scan when more stable (possibly tomorrow or Monday)    Infectious Disease:   - monitor off abx, WBC downtrending  - s/p 8-day course of Zosyn (5/8-5/16) for possible aspiration  - Low grade fever, if persistent, send COVID19     Endocrine:   #T2DM  - NPH switched to 12u q6h  - mod ISS  - continue to monitor FS q6h    Lines/Tubes:  - Arterial line left radial (5/13)  - Central line (5/13)

## 2020-05-17 NOTE — PROGRESS NOTE ADULT - ATTENDING COMMENTS
Pt seen and examined. 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 Minimal oxygen requirements on the mechanical ventilation. Only tolerating short duration of PS trials before developing increased work of breathing. Cont daily weaning trials as tolerated. Low grade fever, leukocytosis improving, BCxs from 5/13 NGTD. Completed a course of IV zosyn x 5 days, Repeat serum procalcitonin level trending down, contto observe off ABx for now. Titrate pressors to keep MAP > 65. Cr trending down, serum proBNP elevated, cont diuresis with Lasix as tolerated to optimize volume status. TTE showing severe pulm HTN which maybe secondary to untreated SHYANN, currently on AC with a heparin gtt for possible PE. Will require a V/Q scan for diagnosis. Overall prognosis guarded.

## 2020-05-18 NOTE — PROGRESS NOTE ADULT - SUBJECTIVE AND OBJECTIVE BOX
INTERVAL HPI/OVERNIGHT EVENTS: cpaped for 10 hours o/n. sugars uncontrolled nph increased to 14q6, patient nery to high 40s, d/c the precedex renewed the restraints, afebrile o/n, net positive 713 in the last 24 hours, UOP 20-30 O/N, ptt therapeutic, Na increased from 149 to 153 on free water 300q6, cr improving 1.96 to 1.83    SUBJECTIVE: Patient seen and examined at bedside.     CONSTITUTIONAL: No weakness, fevers or chills  EYES/ENT: No visual changes;  No vertigo or throat pain   NECK: No pain or stiffness  RESPIRATORY: No cough, wheezing, hemoptysis; No shortness of breath  CARDIOVASCULAR: No chest pain or palpitations  GASTROINTESTINAL: No abdominal or epigastric pain. No nausea, vomiting, or hematemesis; No diarrhea or constipation. No melena or hematochezia.  GENITOURINARY: No dysuria, frequency or hematuria  NEUROLOGICAL: No numbness or weakness  SKIN: No itching, rashes    OBJECTIVE:    VITAL SIGNS:  ICU Vital Signs Last 24 Hrs  T(C): 37.5 (18 May 2020 03:00), Max: 37.9 (17 May 2020 11:00)  T(F): 99.5 (18 May 2020 03:00), Max: 100.3 (17 May 2020 15:00)  HR: 41 (18 May 2020 07:00) (39 - 90)  BP: --  BP(mean): --  ABP: 141/43 (18 May 2020 07:00) (121/36 - 148/55)  ABP(mean): 71 (18 May 2020 07:00) (61 - 89)  RR: 20 (18 May 2020 07:00) (20 - 36)  SpO2: 100% (18 May 2020 07:00) (98% - 100%)    Mode: AC/ CMV (Assist Control/ Continuous Mandatory Ventilation), RR (machine): 20, TV (machine): 450, FiO2: 30, PEEP: 5, ITime: 1, MAP: 10, PIP: 35    05-17 @ 07:01  -  05-18 @ 07:00  --------------------------------------------------------  IN: 2003.8 mL / OUT: 1290 mL / NET: 713.8 mL      CAPILLARY BLOOD GLUCOSE      POCT Blood Glucose.: 267 mg/dL (18 May 2020 05:37)      PHYSICAL EXAM:    General: NAD  HEENT: NC/AT; PERRL, clear conjunctiva  Neck: supple  Respiratory: CTA b/l  Cardiovascular: +S1/S2; RRR  Abdomen: soft, NT/ND; +BS x4  Extremities: WWP, 2+ peripheral pulses b/l; no LE edema  Skin: normal color and turgor; no rash  Neurological:    MEDICATIONS:  MEDICATIONS  (STANDING):  chlorhexidine 0.12% Liquid 15 milliLiter(s) Oral Mucosa every 12 hours  dexMEDEtomidine Infusion 0.1 MICROgram(s)/kG/Hr (2.67 mL/Hr) IV Continuous <Continuous>  epoetin-sandeep-epbx (RETACRIT) Injectable 96450 Unit(s) SubCutaneous <User Schedule>  heparin  Infusion 1400 Unit(s)/Hr (15 mL/Hr) IV Continuous <Continuous>  insulin lispro (HumaLOG) corrective regimen sliding scale   SubCutaneous every 4 hours  insulin NPH human recombinant 14 Unit(s) SubCutaneous every 6 hours  norepinephrine Infusion 0.05 MICROgram(s)/kG/Min (9.99 mL/Hr) IV Continuous <Continuous>  pantoprazole  Injectable 40 milliGRAM(s) IV Push daily  vasopressin Infusion 0.03 Unit(s)/Min (1.8 mL/Hr) IV Continuous <Continuous>    MEDICATIONS  (PRN):  glucagon  Injectable 1 milliGRAM(s) IntraMuscular once PRN Glucose LESS THAN 70 milligrams/deciliter  HYDROmorphone  Injectable 0.5 milliGRAM(s) IV Push every 3 hours PRN pain      ALLERGIES:  Allergies    No Known Allergies    Intolerances        LABS:                        7.4    10.75 )-----------( 195      ( 18 May 2020 01:01 )             25.5     05-18    153<H>  |  116<H>  |  62<H>  ----------------------------<  260<H>  4.3   |  23  |  1.83<H>    Ca    8.3<L>      18 May 2020 01:01  Phos  4.1     05-18  Mg     2.1     05-18    TPro  5.9<L>  /  Alb  2.1<L>  /  TBili  0.1<L>  /  DBili  x   /  AST  14  /  ALT  8<L>  /  AlkPhos  140<H>  05-18    PT/INR - ( 18 May 2020 01:01 )   PT: 13.4 sec;   INR: 1.17 ratio         PTT - ( 18 May 2020 01:01 )  PTT:75.7 sec      RADIOLOGY & ADDITIONAL TESTS: Reviewed.    Authored by Dr. Lamin CALDERON [id number], [id number] INTERVAL HPI/OVERNIGHT EVENTS: cpaped for 10 hours o/n. sugars uncontrolled nph increased to 14q6, patient nery to high 40s, d/c the precedex renewed the restraints, afebrile o/n, net positive 713 in the last 24 hours, UOP 20-30 O/N, ptt therapeutic, Na increased from 149 to 153 on free water 300q6, cr improving 1.96 to 1.83    SUBJECTIVE: Patient seen and examined at bedside.     ROS: unable to assess    OBJECTIVE:    VITAL SIGNS:  ICU Vital Signs Last 24 Hrs  T(C): 37.5 (18 May 2020 03:00), Max: 37.9 (17 May 2020 11:00)  T(F): 99.5 (18 May 2020 03:00), Max: 100.3 (17 May 2020 15:00)  HR: 41 (18 May 2020 07:00) (39 - 90)  BP: --  BP(mean): --  ABP: 141/43 (18 May 2020 07:00) (121/36 - 148/55)  ABP(mean): 71 (18 May 2020 07:00) (61 - 89)  RR: 20 (18 May 2020 07:00) (20 - 36)  SpO2: 100% (18 May 2020 07:00) (98% - 100%)    Mode: AC/ CMV (Assist Control/ Continuous Mandatory Ventilation), RR (machine): 20, TV (machine): 450, FiO2: 30, PEEP: 5, ITime: 1, MAP: 10, PIP: 35    05-17 @ 07:01  -  05-18 @ 07:00  --------------------------------------------------------  IN: 2003.8 mL / OUT: 1290 mL / NET: 713.8 mL      CAPILLARY BLOOD GLUCOSE      POCT Blood Glucose.: 267 mg/dL (18 May 2020 05:37)      PHYSICAL EXAM:    General: NAD  HEENT: NC/AT; PERRL, clear conjunctiva  Neck: supple  Respiratory: CTA b/l  Cardiovascular: +S1/S2; RRR  Abdomen: soft, NT/ND; +BS x4  Extremities: WWP, 2+ peripheral pulses b/l; no LE edema  Skin: normal color and turgor; no rash  Neurological: continues to be agitated see chart note    MEDICATIONS:  MEDICATIONS  (STANDING):  chlorhexidine 0.12% Liquid 15 milliLiter(s) Oral Mucosa every 12 hours  dexMEDEtomidine Infusion 0.1 MICROgram(s)/kG/Hr (2.67 mL/Hr) IV Continuous <Continuous>  epoetin-sandeep-epbx (RETACRIT) Injectable 35738 Unit(s) SubCutaneous <User Schedule>  heparin  Infusion 1400 Unit(s)/Hr (15 mL/Hr) IV Continuous <Continuous>  insulin lispro (HumaLOG) corrective regimen sliding scale   SubCutaneous every 4 hours  insulin NPH human recombinant 14 Unit(s) SubCutaneous every 6 hours  norepinephrine Infusion 0.05 MICROgram(s)/kG/Min (9.99 mL/Hr) IV Continuous <Continuous>  pantoprazole  Injectable 40 milliGRAM(s) IV Push daily  vasopressin Infusion 0.03 Unit(s)/Min (1.8 mL/Hr) IV Continuous <Continuous>    MEDICATIONS  (PRN):  glucagon  Injectable 1 milliGRAM(s) IntraMuscular once PRN Glucose LESS THAN 70 milligrams/deciliter  HYDROmorphone  Injectable 0.5 milliGRAM(s) IV Push every 3 hours PRN pain      ALLERGIES:  Allergies    No Known Allergies    Intolerances        LABS:                        7.4    10.75 )-----------( 195      ( 18 May 2020 01:01 )             25.5     05-18    153<H>  |  116<H>  |  62<H>  ----------------------------<  260<H>  4.3   |  23  |  1.83<H>    Ca    8.3<L>      18 May 2020 01:01  Phos  4.1     05-18  Mg     2.1     05-18    TPro  5.9<L>  /  Alb  2.1<L>  /  TBili  0.1<L>  /  DBili  x   /  AST  14  /  ALT  8<L>  /  AlkPhos  140<H>  05-18    PT/INR - ( 18 May 2020 01:01 )   PT: 13.4 sec;   INR: 1.17 ratio         PTT - ( 18 May 2020 01:01 )  PTT:75.7 sec      RADIOLOGY & ADDITIONAL TESTS: Reviewed.    Authored by Dr. Lamin CALDERON [id number], [id number]

## 2020-05-18 NOTE — PROGRESS NOTE ADULT - SUBJECTIVE AND OBJECTIVE BOX
RUSTY ARANDA 74y [id number]    Patient is a 74y old  Male who presents with a chief complaint of Weakness (18 May 2020 07:44)      Follow Up/CC:  ID following for fever    Interval History/ROS: low grade temps, in ICU    Allergies    No Known Allergies    Intolerances        ANTIMICROBIALS:      MEDICATIONS  (STANDING):  chlorhexidine 0.12% Liquid 15 milliLiter(s) Oral Mucosa every 12 hours  dexMEDEtomidine Infusion 0.1 MICROgram(s)/kG/Hr (2.67 mL/Hr) IV Continuous <Continuous>  epoetin-sandeep-epbx (RETACRIT) Injectable 46683 Unit(s) SubCutaneous <User Schedule>  heparin  Infusion 1400 Unit(s)/Hr (15 mL/Hr) IV Continuous <Continuous>  insulin lispro (HumaLOG) corrective regimen sliding scale   SubCutaneous every 4 hours  insulin NPH human recombinant 14 Unit(s) SubCutaneous every 6 hours  norepinephrine Infusion 0.05 MICROgram(s)/kG/Min (9.99 mL/Hr) IV Continuous <Continuous>  pantoprazole  Injectable 40 milliGRAM(s) IV Push daily  vasopressin Infusion 0.03 Unit(s)/Min (1.8 mL/Hr) IV Continuous <Continuous>    MEDICATIONS  (PRN):  glucagon  Injectable 1 milliGRAM(s) IntraMuscular once PRN Glucose LESS THAN 70 milligrams/deciliter  HYDROmorphone  Injectable 0.5 milliGRAM(s) IV Push every 3 hours PRN pain        Vital Signs Last 24 Hrs  T(C): 37.5 (18 May 2020 03:00), Max: 37.9 (17 May 2020 11:00)  T(F): 99.5 (18 May 2020 03:00), Max: 100.3 (17 May 2020 15:00)  HR: 55 (18 May 2020 09:30) (39 - 90)  BP: --  BP(mean): --  RR: 21 (18 May 2020 09:30) (20 - 36)  SpO2: 100% (18 May 2020 09:30) (98% - 100%)    CBC Full  -  ( 18 May 2020 01:01 )  WBC Count : 10.75 K/uL  RBC Count : 2.59 M/uL  Hemoglobin : 7.4 g/dL  Hematocrit : 25.5 %  Platelet Count - Automated : 195 K/uL  Mean Cell Volume : 98.5 fl  Mean Cell Hemoglobin : 28.6 pg  Mean Cell Hemoglobin Concentration : 29.0 gm/dL  Auto Neutrophil # : x  Auto Lymphocyte # : x  Auto Monocyte # : x  Auto Eosinophil # : x  Auto Basophil # : x  Auto Neutrophil % : x  Auto Lymphocyte % : x  Auto Monocyte % : x  Auto Eosinophil % : x  Auto Basophil % : x    05-18    153<H>  |  116<H>  |  62<H>  ----------------------------<  260<H>  4.3   |  23  |  1.83<H>    Ca    8.3<L>      18 May 2020 01:01  Phos  4.1     05-18  Mg     2.1     05-18    TPro  5.9<L>  /  Alb  2.1<L>  /  TBili  0.1<L>  /  DBili  x   /  AST  14  /  ALT  8<L>  /  AlkPhos  140<H>  05-18    LIVER FUNCTIONS - ( 18 May 2020 01:01 )  Alb: 2.1 g/dL / Pro: 5.9 g/dL / ALK PHOS: 140 U/L / ALT: 8 U/L / AST: 14 U/L / GGT: x           Procalcitonin, Serum: 6.82 (05-16)  Procalcitonin, Serum: 7.44 (05-14)  Procalcitonin, Serum: 0.80 (05-13)    C-Reactive Protein, Serum: 23.72 (05-13)  C-Reactive Protein, Serum: 28.76 (05-11)    Ferritin, Serum: 321 (05-14)  Ferritin, Serum: 324 (05-11)      D-Dimer Assay, Quantitative: 930 (05-13)  D-Dimer Assay, Quantitative: 1274 (05-12)  D-Dimer Assay, Quantitative: 925 (05-11)        MICROBIOLOGY:  .Sputum Sputum  05-14-20   No growth  --    Numerous polymorphonuclear leukocytes per low power field  Few Squamous epithelial cells per low power field  No organisms seen per oil power field      .Blood Blood-Peripheral  05-13-20   No growth to date.  --  --      .Blood Blood-Peripheral  05-13-20   No growth to date.  --  --      .Blood Blood-Peripheral  05-08-20   No Growth Final  --  --      .Urine Clean Catch (Midstream)  05-08-20   No growth  --  --    COVID-19 PCR . (05.15.20 @ 15:54)    COVID-19 PCR: NotDetec: This test has been validated by [employer] to be accurate;  though it has not been FDA cleared/approved by the usual pathway.  As with all laboratory tests, results should be correlated with clinical  findings.  https://www.fda.gov/media/626213/download  https://www.fda.gov/media/001587/download          RADIOLOGY    < from: Xray Chest 1 View- PORTABLE-Routine (05.14.20 @ 07:15) >  The mediastinal cardiac silhouette is unremarkable. Sternotomy. ET tube between clavicles and jony. Right IJ line in superior vena cava. Enteric tube in stomach.    Low lung volumes and rotation limit evaluation. Mild bibasilar opacities unchanged.     No acute osseous finding.     < end of copied text >

## 2020-05-18 NOTE — CHART NOTE - NSCHARTNOTEFT_GEN_A_CORE
spoke extensively with patient's family giving updates and speaking of further clinical management. Patient was exhibiting extremely self injurious and behavior and was attempting to pull tube and lines after procedure, continued restraints at that time, patient's informed and told of high risks of self extubation and injury to self and strongly requesting to have restraints removed for a trial at some point today. Conveyed family's wishes to nurse.     Lamin Dotson PGY1

## 2020-05-18 NOTE — PROGRESS NOTE ADULT - ATTENDING COMMENTS
Ran Tong  Attending Physician   Division of Infectious Disease  Pager #[phone]  After 5pm/weekend or no response, call #[phone]

## 2020-05-18 NOTE — PROGRESS NOTE ADULT - ASSESSMENT
73 yo M w/ hx of CAD s/p CABG, T2DM, HTN, HLD, Prostate Cancer on chemo presenting with bilateral lower extremity weakness concerning for cord compression, found to have metastatic disease, c/b acute hypoxic respiratory failure, possibly 2/2 suspected PE. Now s/p intubation and requiring course of pressors while on sedation.    Neurologic:  - Sedated with Precedex   - Tylenol/Dilaudid PRN for pain    Respiratory:  #Acute hypoxic respiratory failure, possibly 2/2 suspected PE   - Intubated, currently on mechanical ventilation settings 450/20/5/30  - Monitor ABGs  - Failed CPAP trials on 5/15 and 5/16  - When ready, will plan to extubate to BiPAP    Cardiovascular:   #Hx of HTN and CAD s/p CABG  - TTE (5/12): EF 60-65%; minimal MS, mod AS, minimal AR, eccentric LVH; grossly normal LVSF, decreased RVSF, severe pulm HTN  - able to tolerate off pressors while off sedations, but currently requiring levo and vaso gtt while on sedation  - continue to monitor BP  - a-line for monitoring  - pro-BNP elevated to 4459 (on 5/14); f/u repeat pro-BNP in AM  - s/p Lasix 40mg IV x1 on 5/15; x1 on 5/16, will monitor UOP and consider additional Lasix if needed  - holding home PO cardiac meds for now    Gastrointestinal/Nutrition:   - NPO with tube feeds  - PPI 40mg IV daily for prophylaxis     Renal/Genitourinary:   #MARJORIE, improving  - Cr up to 3.7, now downtrending, continue to monitor (Cr ~2 at presentation and ~1.2 in 2017)  - Monitor UOP, consider additional diuresis PRN  - Monitor electrolytes and replete PRN     Hematologic:   #Suspected PE  - c/w heparin gtt  - Hgb downtrending slightly, but no signs of active bleeding  - Continue to monitor CBC, PTT, INR  - b/l LE duplex (5/11): negative for DVTs  - plan for V/Q scan when more stable (possibly tomorrow or Monday)    Infectious Disease:   - monitor off abx, WBC downtrending  - s/p 8-day course of Zosyn (5/8-5/16) for possible aspiration  - Low grade fever, if persistent, send COVID19     Endocrine:   #T2DM  - NPH switched to 12u q6h  - mod ISS  - continue to monitor FS q6h    Lines/Tubes:  - Arterial line left radial (5/13)  - Central line (5/13) 73 yo M w/ hx of CAD s/p CABG, T2DM, HTN, HLD, Prostate Cancer on chemo presenting with bilateral lower extremity weakness concerning for cord compression, found to have metastatic disease, c/b acute hypoxic respiratory failure, possibly 2/2 suspected PE. Now s/p intubation and requiring course of pressors while on sedation .    Neurologic:  - Sedated with Precedex   - Tylenol/Dilaudid PRN for pain    Respiratory:  #Acute hypoxic respiratory failure, possibly 2/2 suspected PE   - Intubated, currently on mechanical ventilation settings 450/20/5/30  - Monitor ABGs  - Failed CPAP trials on 5/15 and 5/16  - When ready, will plan to extubate to BiPAP    Cardiovascular:   #Hx of HTN and CAD s/p CABG  - TTE (5/12): EF 60-65%; minimal MS, mod AS, minimal AR, eccentric LVH; grossly normal LVSF, decreased RVSF, severe pulm HTN  - able to tolerate off pressors while off sedations, but currently requiring levo and vaso gtt while on sedation  - continue to monitor BP  - a-line for monitoring  - pro-BNP elevated to 4459 (on 5/14); f/u repeat pro-BNP in AM  - s/p Lasix 40mg IV x1 on 5/15; x1 on 5/16, will monitor UOP and consider additional Lasix if needed  - holding home PO cardiac meds for now    Gastrointestinal/Nutrition:   - NPO with tube feeds  - PPI 40mg IV daily for prophylaxis     Renal/Genitourinary:   #MARJORIE, improving  - Cr up to 3.7, now downtrending, continue to monitor (Cr ~2 at presentation and ~1.2 in 2017)  - Monitor UOP, consider additional diuresis PRN  - Monitor electrolytes and replete PRN     Hematologic:   #Suspected PE  - c/w heparin gtt  - Hgb downtrending slightly, but no signs of active bleeding  - Continue to monitor CBC, PTT, INR  - b/l LE duplex (5/11): negative for DVTs  - plan for V/Q scan when more stable (possibly tomorrow or Monday)    Infectious Disease:   - monitor off abx, WBC downtrending  - s/p 8-day course of Zosyn (5/8-5/16) for possible aspiration  - Low grade fever, if persistent, send COVID19     Endocrine:   #T2DM  - NPH switched to 12u q6h  - mod ISS  - continue to monitor FS q6h    Lines/Tubes:  - Arterial line left radial (5/13)  - Central line (5/13) 75 yo M w/ hx of CAD s/p CABG, T2DM, HTN, HLD, metastatic Prostate Cancer on chemo presenting with bilateral lower extremity weakness r/o for cord compression, found to have metastatic disease of sacral and illiac crest, c/b intermittent agitation with CTH with no new acute pathology, postrenal and prerenal MARJORIE on CKD III, and with intermittent courses of empiric abx, and acute hypoxic respiratory failure, unimproved on Bipap possibly 2/2 suspected PE with severe pulm HTN vs pna vs fluid overload. RRT 5/13 s/p intubation/central line/fidencio and requiring course of pressors while on sedation    Neurologic:  - Sedated with Precedex   - Tylenol/Dilaudid PRN for pain    Respiratory:  #Acute hypoxic respiratory failure, possibly 2/2 suspected PE   - Intubated, currently on mechanical ventilation settings 450/20/5/30  - Monitor ABGs  - Failed CPAP trials on 5/15 and 5/16  - When ready, will plan to extubate to BiPAP    Cardiovascular:   #Hx of HTN and CAD s/p CABG  - TTE (5/12): EF 60-65%; minimal MS, mod AS, minimal AR, eccentric LVH; grossly normal LVSF, decreased RVSF, severe pulm HTN  - able to tolerate off pressors while off sedations, but currently requiring levo and vaso gtt while on sedation  - continue to monitor BP  - a-line for monitoring  - pro-BNP elevated to 4459 (on 5/14); f/u repeat pro-BNP in AM  - s/p Lasix 40mg IV x1 on 5/15; x1 on 5/16, will monitor UOP and consider additional Lasix if needed  - holding home PO cardiac meds for now    Gastrointestinal/Nutrition:   - NPO with tube feeds  - PPI 40mg IV daily for prophylaxis     Renal/Genitourinary:   #MARJORIE, improving  - Cr up to 3.7, now downtrending, continue to monitor (Cr ~2 at presentation and ~1.2 in 2017)  - Monitor UOP, consider additional diuresis PRN  - Monitor electrolytes and replete PRN     Hematologic:   #Suspected PE  - c/w heparin gtt  - Hgb downtrending slightly, but no signs of active bleeding  - Continue to monitor CBC, PTT, INR  - b/l LE duplex (5/11): negative for DVTs  - plan for V/Q scan when more stable (possibly tomorrow or Monday)    Infectious Disease:   - monitor off abx, WBC downtrending  - s/p 8-day course of Zosyn (5/8-5/16) for possible aspiration  - Low grade fever, if persistent, send COVID19     Endocrine:   #T2DM  - NPH switched to 12u q6h  - mod ISS  - continue to monitor FS q6h    Lines/Tubes:  - Arterial line left radial (5/13)  - Central line (5/13) 73 yo M w/ hx of CAD s/p CABG, T2DM, HTN, HLD, metastatic Prostate Cancer on chemo admitted 5/8 for bilateral lower extremity weakness r/o for cord compression, found to have metastatic disease of sacral and illiac crest, c/b intermittent agitation with CTH with no new acute pathology, postrenal and prerenal MARJORIE on CKD III, and with intermittent courses of empiric abx, and acute hypoxic respiratory failure, unimproved on Bipap possibly 2/2 suspected PE with severe pulm HTN vs pna vs fluid overload. RRT 5/13 s/p intubation/central line/fidencio and requiring course of pressors while on sedation    Neurologic:  - Sedated with Precedex   - Tylenol/Dilaudid PRN for pain    Respiratory:  #Acute hypoxic respiratory failure, possibly 2/2 suspected PE   - Intubated, currently on mechanical ventilation settings 450/20/5/30  - Monitor ABGs  - Failed CPAP trials on 5/15 and 5/16  - When ready, will plan to extubate to BiPAP    Cardiovascular:   #Hx of HTN and CAD s/p CABG  - TTE (5/12): EF 60-65%; minimal MS, mod AS, minimal AR, eccentric LVH; grossly normal LVSF, decreased RVSF, severe pulm HTN  - able to tolerate off pressors while off sedations, but currently requiring levo and vaso gtt while on sedation  - continue to monitor BP  - a-line for monitoring  - pro-BNP elevated to 4459 (on 5/14); f/u repeat pro-BNP in AM  - s/p Lasix 40mg IV x1 on 5/15; x1 on 5/16, will monitor UOP and consider additional Lasix if needed  - holding home PO cardiac meds for now    Gastrointestinal/Nutrition:   - NPO with tube feeds  - PPI 40mg IV daily for prophylaxis     Renal/Genitourinary:   #MARJORIE, improving  - Cr up to 3.7, now downtrending, continue to monitor (Cr ~2 at presentation and ~1.2 in 2017)  - Monitor UOP, consider additional diuresis PRN  - Monitor electrolytes and replete PRN     Hematologic:   #Suspected PE  - c/w heparin gtt  - Hgb downtrending slightly, but no signs of active bleeding  - Continue to monitor CBC, PTT, INR  - b/l LE duplex (5/11): negative for DVTs  - plan for V/Q scan when more stable (possibly tomorrow or Monday)    Infectious Disease:   - monitor off abx, WBC downtrending  - s/p 8-day course of Zosyn (5/8-5/16) for possible aspiration  - Low grade fever, if persistent, send COVID19     Endocrine:   #T2DM  - NPH switched to 12u q6h  - mod ISS  - continue to monitor FS q6h    Lines/Tubes:  - Arterial line left radial (5/13)  - Central line (5/13) 75 yo M w/ hx of CAD s/p CABG, T2DM, HTN, HLD, metastatic Prostate Cancer on chemo admitted 5/8 for bilateral lower extremity weakness r/o for cord compression, found to have metastatic disease of sacral and illiac crest, c/b intermittent agitation with CTH with no new acute pathology, postrenal and prerenal MARJORIE on CKD III, and with intermittent courses of empiric abx, and acute hypoxic respiratory failure, unimproved on Bipap possibly 2/2 suspected PE with severe pulm HTN vs pna vs fluid overload. RRT 5/13 s/p intubation/central line/fidencio and requiring course of pressors while on sedation now on levo, hep gtt, precedex, pantoprazole IVP, dilaudid PRN for pain now titrating free water for hyper NA    Neurologic:  - Sedated with Precedex   - Tylenol/Dilaudid PRN for pain    Respiratory:  #Acute hypoxic respiratory failure, possibly 2/2 suspected PE   - Intubated, currently on mechanical ventilation settings 450/20/5/30  - Monitor ABGs  - Failed CPAP trials on 5/15 and 5/16  - When ready, will plan to extubate to BiPAP    Cardiovascular:   #Hx of HTN and CAD s/p CABG  - TTE (5/12): EF 60-65%; minimal MS, mod AS, minimal AR, eccentric LVH; grossly normal LVSF, decreased RVSF, severe pulm HTN  - able to tolerate off pressors while off sedations, but currently requiring levo and vaso gtt while on sedation  - continue to monitor BP  - a-line for monitoring  - pro-BNP elevated to 4459 (on 5/14); f/u repeat pro-BNP in AM  - s/p Lasix 40mg IV x1 on 5/15; x1 on 5/16, will monitor UOP and consider additional Lasix if needed  - holding home PO cardiac meds for now    Gastrointestinal/Nutrition:   - NPO with tube feeds  - PPI 40mg IV daily for prophylaxis     Renal/Genitourinary:   #MARJORIE, improving  - Cr up to 3.7, now downtrending, continue to monitor (Cr ~2 at presentation and ~1.2 in 2017)  - Monitor UOP, consider additional diuresis PRN  - Monitor electrolytes and replete PRN     Hematologic:   #Suspected PE  - c/w heparin gtt  - Hgb downtrending slightly, but no signs of active bleeding  - Continue to monitor CBC, PTT, INR  - b/l LE duplex (5/11): negative for DVTs  - plan for V/Q scan when more stable (possibly tomorrow or Monday)    Infectious Disease:   - monitor off abx, WBC downtrending  - s/p 8-day course of Zosyn (5/8-5/16) for possible aspiration  - Low grade fever, if persistent, send COVID19     Endocrine:   #T2DM  - NPH switched to 12u q6h  - mod ISS  - continue to monitor FS q6h    Lines/Tubes:  - Arterial line left radial (5/13)  - Central line (5/13)

## 2020-05-18 NOTE — PROGRESS NOTE ADULT - ATTENDING COMMENTS
Agree with above. Patient seen and examined. Patient critically ill requiring frequent bedside visits with therapy change. Patient is a 74 M with extensive medical history as noted above including prostate Ca on chemo, a/w weakness and  found to have metastatic disease to the sacrum and iliac who required intubation from for hypoxic respiratory failure in the setting of suspected pneumonia.    He continues to require mechanical ventilation but is now tolerating PSV slightly better though not yet ready for extubation. He remains on AC for potential PE though no clear evidence of clot. Will check VQ scan today. No CTPA given renal dysfunction. Patient does have MARJORIE though improvement in renal function this AM. Will continue diuresis as able given evidence of volume overload, while monitoring hypernatremia. His shock state is improving and he is currently on on Vasopressin. WIll try and D/C and if patient requires pressors will switch to Levophed. Patient had transient bradycardia in the setting of Precedex. He does have evidence of severe pulmonary HTN and decreased RV function on echo which will need further workup once acute issues resolve. Moderate AS. Continue to wean with the goal for extubation though previously patient has been limited by increased work of breathing.    Patient has completed a course of antibiotics. Followup all cultures. Continue adjustment of NPH regimen.    Prognosis guarded.

## 2020-05-18 NOTE — PROGRESS NOTE ADULT - SUBJECTIVE AND OBJECTIVE BOX
Patient is a 74y Male whom presented to the hospital with ckd and zehra     PAST MEDICAL & SURGICAL HISTORY:  HTN (hypertension)  Prostate cancer  Hyperlipidemia  Neuropathy  Diabetes mellitus, type 2  CAD (coronary artery disease)  History of cholecystectomy  H/O coronary artery bypass surgery      MEDICATIONS  (STANDING):  calcium gluconate IVPB 1 Gram(s) IV Intermittent once  chlorhexidine 0.12% Liquid 15 milliLiter(s) Oral Mucosa every 12 hours  chlorhexidine 4% Liquid 1 Application(s) Topical <User Schedule>  chlorhexidine 4% Liquid 1 Application(s) Topical <User Schedule>  dexMEDEtomidine Infusion 0.1 MICROgram(s)/kG/Hr (2.67 mL/Hr) IV Continuous <Continuous>  dextrose 50% Injectable 50 milliLiter(s) IV Push once  heparin  Infusion.  Unit(s)/Hr (19 mL/Hr) IV Continuous <Continuous>  insulin lispro (HumaLOG) corrective regimen sliding scale   SubCutaneous every 4 hours  insulin regular  human recombinant. 10 Unit(s) IV Push once  lactated ringers. 1000 milliLiter(s) (100 mL/Hr) IV Continuous <Continuous>  norepinephrine Infusion 0.05 MICROgram(s)/kG/Min (9.99 mL/Hr) IV Continuous <Continuous>  pantoprazole  Injectable 40 milliGRAM(s) IV Push daily  piperacillin/tazobactam IVPB.. 3.375 Gram(s) IV Intermittent every 8 hours  vasopressin Infusion 0.03 Unit(s)/Min (1.8 mL/Hr) IV Continuous <Continuous>      Allergies    No Known Allergies    Intolerances        SOCIAL HISTORY:  Denies ETOh,Smoking,     FAMILY HISTORY:  No pertinent family history in first degree relatives      REVIEW OF SYSTEMS:    unable to obtained a good review system                                       7.4    10.75 )-----------( 195      ( 18 May 2020 01:01 )             25.5       CBC Full  -  ( 18 May 2020 01:01 )  WBC Count : 10.75 K/uL  RBC Count : 2.59 M/uL  Hemoglobin : 7.4 g/dL  Hematocrit : 25.5 %  Platelet Count - Automated : 195 K/uL  Mean Cell Volume : 98.5 fl  Mean Cell Hemoglobin : 28.6 pg  Mean Cell Hemoglobin Concentration : 29.0 gm/dL  Auto Neutrophil # : x  Auto Lymphocyte # : x  Auto Monocyte # : x  Auto Eosinophil # : x  Auto Basophil # : x  Auto Neutrophil % : x  Auto Lymphocyte % : x  Auto Monocyte % : x  Auto Eosinophil % : x  Auto Basophil % : x      05-18    151<H>  |  116<H>  |  60<H>  ----------------------------<  148<H>  3.4<L>   |  23  |  1.61<H>    Ca    8.4      18 May 2020 18:07  Phos  3.4     05-18  Mg     2.1     05-18    TPro  5.9<L>  /  Alb  1.8<L>  /  TBili  0.1<L>  /  DBili  x   /  AST  17  /  ALT  9<L>  /  AlkPhos  139<H>  05-18      CAPILLARY BLOOD GLUCOSE      POCT Blood Glucose.: 161 mg/dL (18 May 2020 16:56)  POCT Blood Glucose.: 185 mg/dL (18 May 2020 13:13)  POCT Blood Glucose.: 256 mg/dL (18 May 2020 10:54)  POCT Blood Glucose.: 267 mg/dL (18 May 2020 05:37)  POCT Blood Glucose.: 206 mg/dL (17 May 2020 21:45)      Vital Signs Last 24 Hrs  T(C): 36.8 (18 May 2020 19:00), Max: 37.5 (18 May 2020 03:00)  T(F): 98.2 (18 May 2020 19:00), Max: 99.5 (18 May 2020 03:00)  HR: 60 (18 May 2020 20:15) (39 - 109)  BP: --  BP(mean): --  RR: 24 (18 May 2020 20:15) (19 - 39)  SpO2: 99% (18 May 2020 20:15) (92% - 100%)        PT/INR - ( 18 May 2020 01:01 )   PT: 13.4 sec;   INR: 1.17 ratio         PTT - ( 18 May 2020 01:01 )  PTT:75.7 sec                       PHYSICAL EXAM:  intubated   Constitutional: on vent    HEENT: conjunctive   clear   Neck:  No JVD  Respiratory: decrease bs b/l   Cardiovascular: S1 and S2  Gastrointestinal: BS+, soft, NT/ND  Extremities: No peripheral edema  Skin: dry   Access: Not applicable

## 2020-05-19 NOTE — PROGRESS NOTE ADULT - SUBJECTIVE AND OBJECTIVE BOX
INTERVAL HPI/OVERNIGHT EVENTS:    SUBJECTIVE: Patient seen and examined at bedside.     CONSTITUTIONAL: No weakness, fevers or chills  EYES/ENT: No visual changes;  No vertigo or throat pain   NECK: No pain or stiffness  RESPIRATORY: No cough, wheezing, hemoptysis; No shortness of breath  CARDIOVASCULAR: No chest pain or palpitations  GASTROINTESTINAL: No abdominal or epigastric pain. No nausea, vomiting, or hematemesis; No diarrhea or constipation. No melena or hematochezia.  GENITOURINARY: No dysuria, frequency or hematuria  NEUROLOGICAL: No numbness or weakness  SKIN: No itching, rashes    OBJECTIVE:    VITAL SIGNS:  ICU Vital Signs Last 24 Hrs  T(C): 37.2 (19 May 2020 03:00), Max: 37.4 (18 May 2020 23:00)  T(F): 99 (19 May 2020 03:00), Max: 99.4 (18 May 2020 23:00)  HR: 70 (19 May 2020 06:00) (39 - 109)  BP: --  BP(mean): --  ABP: 147/43 (19 May 2020 06:00) (101/33 - 175/55)  ABP(mean): 76 (19 May 2020 06:00) (55 - 103)  RR: 28 (19 May 2020 06:00) (15 - 39)  SpO2: 99% (19 May 2020 06:00) (92% - 100%)    Mode: CPAP with PS, FiO2: 30, PEEP: 5, PS: 8, MAP: 8, PIP: 14    05-17 @ 07:01 - 05-18 @ 07:00  --------------------------------------------------------  IN: 2003.8 mL / OUT: 1290 mL / NET: 713.8 mL    05-18 @ 07:01  -  05-19 @ 06:15  --------------------------------------------------------  IN: 1617.7 mL / OUT: 1320 mL / NET: 297.7 mL      CAPILLARY BLOOD GLUCOSE      POCT Blood Glucose.: 75 mg/dL (19 May 2020 06:01)      PHYSICAL EXAM:    General: NAD  HEENT: NC/AT; PERRL, clear conjunctiva  Neck: supple  Respiratory: CTA b/l  Cardiovascular: +S1/S2; RRR  Abdomen: soft, NT/ND; +BS x4  Extremities: WWP, 2+ peripheral pulses b/l; no LE edema  Skin: normal color and turgor; no rash  Neurological:    MEDICATIONS:  MEDICATIONS  (STANDING):  dexMEDEtomidine Infusion 0.1 MICROgram(s)/kG/Hr (2.67 mL/Hr) IV Continuous <Continuous>  dextrose 50% Injectable 25 milliLiter(s) IV Push once  epoetin-sandeep-epbx (RETACRIT) Injectable 77995 Unit(s) SubCutaneous <User Schedule>  insulin lispro (HumaLOG) corrective regimen sliding scale   SubCutaneous every 4 hours  insulin NPH human recombinant 14 Unit(s) SubCutaneous every 6 hours  norepinephrine Infusion 0.05 MICROgram(s)/kG/Min (9.99 mL/Hr) IV Continuous <Continuous>  pantoprazole  Injectable 40 milliGRAM(s) IV Push daily    MEDICATIONS  (PRN):  glucagon  Injectable 1 milliGRAM(s) IntraMuscular once PRN Glucose LESS THAN 70 milligrams/deciliter  HYDROmorphone  Injectable 0.5 milliGRAM(s) IV Push every 3 hours PRN pain      ALLERGIES:  Allergies    No Known Allergies    Intolerances        LABS:                        7.9    17.41 )-----------( 240      ( 19 May 2020 00:55 )             26.0     05-19    148<H>  |  115<H>  |  56<H>  ----------------------------<  161<H>  3.8   |  24  |  1.63<H>    Ca    8.5      19 May 2020 00:55  Phos  3.3     05-19  Mg     2.0     05-19    TPro  5.9<L>  /  Alb  2.2<L>  /  TBili  <0.1<L>  /  DBili  x   /  AST  13  /  ALT  8<L>  /  AlkPhos  142<H>  05-19    PT/INR - ( 19 May 2020 00:55 )   PT: 13.6 sec;   INR: 1.18 ratio         PTT - ( 19 May 2020 00:55 )  PTT:80.5 sec      RADIOLOGY & ADDITIONAL TESTS: Reviewed.    Authored by Dr. Lamin CALDERON [id number], [id number] INTERVAL HPI/ OVERNIGHT EVENTS: CPAP over night, Hep drip stopped, continues to be hyper Na now free fluids 400 q6, became intermittently hypoglycemic while NPO so received 1/2 amp     SUBJECTIVE: Patient seen and examined at bedside. RSBI score 90, extubated to NRB this morning with no complications, dysphagia screen after secretions decreased    ROS: unable to assess this morning    OBJECTIVE:    VITAL SIGNS:  ICU Vital Signs Last 24 Hrs  T(C): 37.2 (19 May 2020 03:00), Max: 37.4 (18 May 2020 23:00)  T(F): 99 (19 May 2020 03:00), Max: 99.4 (18 May 2020 23:00)  HR: 70 (19 May 2020 06:00) (39 - 109)  BP: --  BP(mean): --  ABP: 147/43 (19 May 2020 06:00) (101/33 - 175/55)  ABP(mean): 76 (19 May 2020 06:00) (55 - 103)  RR: 28 (19 May 2020 06:00) (15 - 39)  SpO2: 99% (19 May 2020 06:00) (92% - 100%)    Mode: CPAP with PS, FiO2: 30, PEEP: 5, PS: 8, MAP: 8, PIP: 14    05-17 @ 07:01  -  05-18 @ 07:00  --------------------------------------------------------  IN: 2003.8 mL / OUT: 1290 mL / NET: 713.8 mL    05-18 @ 07:01  -  05-19 @ 06:15  --------------------------------------------------------  IN: 1617.7 mL / OUT: 1320 mL / NET: 297.7 mL      CAPILLARY BLOOD GLUCOSE      POCT Blood Glucose.: 75 mg/dL (19 May 2020 06:01)      PHYSICAL EXAM:    General: NAD  HEENT: NC/AT; PERRL, clear conjunctiva  Neck: supple  Respiratory: CTA b/l  Cardiovascular: +S1/S2; RRR  Abdomen: soft, NT/ND; +BS x4  Extremities: WWP, 2+ peripheral pulses b/l; no LE edema  Skin: normal color and turgor; no rash  Neurological: ao 4, calm    MEDICATIONS:  MEDICATIONS  (STANDING):  dexMEDEtomidine Infusion 0.1 MICROgram(s)/kG/Hr (2.67 mL/Hr) IV Continuous <Continuous>  dextrose 50% Injectable 25 milliLiter(s) IV Push once  epoetin-sandeep-epbx (RETACRIT) Injectable 60214 Unit(s) SubCutaneous <User Schedule>  insulin lispro (HumaLOG) corrective regimen sliding scale   SubCutaneous every 4 hours  insulin NPH human recombinant 14 Unit(s) SubCutaneous every 6 hours  norepinephrine Infusion 0.05 MICROgram(s)/kG/Min (9.99 mL/Hr) IV Continuous <Continuous>  pantoprazole  Injectable 40 milliGRAM(s) IV Push daily    MEDICATIONS  (PRN):  glucagon  Injectable 1 milliGRAM(s) IntraMuscular once PRN Glucose LESS THAN 70 milligrams/deciliter  HYDROmorphone  Injectable 0.5 milliGRAM(s) IV Push every 3 hours PRN pain      ALLERGIES:  Allergies    No Known Allergies    Intolerances        LABS:                        7.9    17.41 )-----------( 240      ( 19 May 2020 00:55 )             26.0     05-19    148<H>  |  115<H>  |  56<H>  ----------------------------<  161<H>  3.8   |  24  |  1.63<H>    Ca    8.5      19 May 2020 00:55  Phos  3.3     05-19  Mg     2.0     05-19    TPro  5.9<L>  /  Alb  2.2<L>  /  TBili  <0.1<L>  /  DBili  x   /  AST  13  /  ALT  8<L>  /  AlkPhos  142<H>  05-19    PT/INR - ( 19 May 2020 00:55 )   PT: 13.6 sec;   INR: 1.18 ratio         PTT - ( 19 May 2020 00:55 )  PTT:80.5 sec      RADIOLOGY & ADDITIONAL TESTS: Reviewed.    Authored by Dr. Lamin CALDERON [id number], [id number] INTERVAL HPI/ OVERNIGHT EVENTS: CPAP over night, Hep drip stopped, continues to be hyper Na now free fluids 400 q6, became intermittently hypoglycemic while NPO so received 1/2 amp     SUBJECTIVE: Patient seen and examined at bedside. RSBI score 90, extubated to NRB this morning with no complications, dysphagia screen after secretions decreased    ROS: unable to assess this morning    OBJECTIVE:    VITAL SIGNS:  ICU Vital Signs Last 24 Hrs  T(C): 37.2 (19 May 2020 03:00), Max: 37.4 (18 May 2020 23:00)  T(F): 99 (19 May 2020 03:00), Max: 99.4 (18 May 2020 23:00)  HR: 70 (19 May 2020 06:00) (39 - 109)  BP: --  BP(mean): --  ABP: 147/43 (19 May 2020 06:00) (101/33 - 175/55)  ABP(mean): 76 (19 May 2020 06:00) (55 - 103)  RR: 28 (19 May 2020 06:00) (15 - 39)  SpO2: 99% (19 May 2020 06:00) (92% - 100%)    Mode: CPAP with PS, FiO2: 30, PEEP: 5, PS: 8, MAP: 8, PIP: 14    05-17 @ 07:01  -  05-18 @ 07:00  --------------------------------------------------------  IN: 2003.8 mL / OUT: 1290 mL / NET: 713.8 mL    05-18 @ 07:01  -  05-19 @ 06:15  --------------------------------------------------------  IN: 1617.7 mL / OUT: 1320 mL / NET: 297.7 mL      CAPILLARY BLOOD GLUCOSE      POCT Blood Glucose.: 75 mg/dL (19 May 2020 06:01)      PHYSICAL EXAM:    General: NAD  HEENT: NC/AT; PERRL, clear conjunctiva. some oral thrush  Neck: supple  Respiratory: CTA b/l  Cardiovascular: +S1/S2; RRR  Abdomen: soft, NT/ND; +BS x4  Extremities: WWP, 2+ peripheral pulses b/l; no LE edema  Skin: normal color and turgor; no rash  Neurological: ao 4, calm    MEDICATIONS:  MEDICATIONS  (STANDING):  dexMEDEtomidine Infusion 0.1 MICROgram(s)/kG/Hr (2.67 mL/Hr) IV Continuous <Continuous>  dextrose 50% Injectable 25 milliLiter(s) IV Push once  epoetin-sandeep-epbx (RETACRIT) Injectable 65496 Unit(s) SubCutaneous <User Schedule>  insulin lispro (HumaLOG) corrective regimen sliding scale   SubCutaneous every 4 hours  insulin NPH human recombinant 14 Unit(s) SubCutaneous every 6 hours  norepinephrine Infusion 0.05 MICROgram(s)/kG/Min (9.99 mL/Hr) IV Continuous <Continuous>  pantoprazole  Injectable 40 milliGRAM(s) IV Push daily    MEDICATIONS  (PRN):  glucagon  Injectable 1 milliGRAM(s) IntraMuscular once PRN Glucose LESS THAN 70 milligrams/deciliter  HYDROmorphone  Injectable 0.5 milliGRAM(s) IV Push every 3 hours PRN pain      ALLERGIES:  Allergies    No Known Allergies    Intolerances        LABS:                        7.9    17.41 )-----------( 240      ( 19 May 2020 00:55 )             26.0     05-19    148<H>  |  115<H>  |  56<H>  ----------------------------<  161<H>  3.8   |  24  |  1.63<H>    Ca    8.5      19 May 2020 00:55  Phos  3.3     05-19  Mg     2.0     05-19    TPro  5.9<L>  /  Alb  2.2<L>  /  TBili  <0.1<L>  /  DBili  x   /  AST  13  /  ALT  8<L>  /  AlkPhos  142<H>  05-19    PT/INR - ( 19 May 2020 00:55 )   PT: 13.6 sec;   INR: 1.18 ratio         PTT - ( 19 May 2020 00:55 )  PTT:80.5 sec      RADIOLOGY & ADDITIONAL TESTS: Reviewed.    Authored by Dr. Lamin CALDERON [id number], [id number]

## 2020-05-19 NOTE — PROGRESS NOTE ADULT - ASSESSMENT
73 yo M w/ hx of CAD s/p CABG, T2DM, HTN, HLD, metastatic Prostate Cancer on chemo admitted 5/8 for bilateral lower extremity weakness r/o for cord compression, found to have metastatic disease of sacral and illiac crest, c/b intermittent agitation with CTH with no new acute pathology, postrenal and prerenal MARJORIE on CKD III, and with intermittent courses of empiric abx, and acute hypoxic respiratory failure, unimproved on Bipap possibly 2/2 suspected PE with severe pulm HTN vs pna vs fluid overload. RRT 5/13 s/p intubation/central line/fidencio and requiring course of pressors while on sedation now on levo, hep gtt, precedex, pantoprazole IVP, dilaudid PRN for pain now titrating free water for hyper NA    Neurologic:  - Sedated with Precedex   - Tylenol/Dilaudid PRN for pain    Respiratory:  #Acute hypoxic respiratory failure, possibly 2/2 suspected PE   - Intubated, currently on mechanical ventilation settings 450/20/5/30  - Monitor ABGs  - Failed CPAP trials on 5/15 and 5/16  - When ready, will plan to extubate to BiPAP    Cardiovascular:   #Hx of HTN and CAD s/p CABG  - TTE (5/12): EF 60-65%; minimal MS, mod AS, minimal AR, eccentric LVH; grossly normal LVSF, decreased RVSF, severe pulm HTN  - able to tolerate off pressors while off sedations, but currently requiring levo and vaso gtt while on sedation  - continue to monitor BP  - a-line for monitoring  - pro-BNP elevated to 4459 (on 5/14); f/u repeat pro-BNP in AM  - s/p Lasix 40mg IV x1 on 5/15; x1 on 5/16, will monitor UOP and consider additional Lasix if needed  - holding home PO cardiac meds for now    Gastrointestinal/Nutrition:   - NPO with tube feeds  - PPI 40mg IV daily for prophylaxis     Renal/Genitourinary:   #MARJORIE, improving  - Cr up to 3.7, now downtrending, continue to monitor (Cr ~2 at presentation and ~1.2 in 2017)  - Monitor UOP, consider additional diuresis PRN  - Monitor electrolytes and replete PRN     Hematologic:   #Suspected PE  - c/w heparin gtt  - Hgb downtrending slightly, but no signs of active bleeding  - Continue to monitor CBC, PTT, INR  - b/l LE duplex (5/11): negative for DVTs  - plan for V/Q scan when more stable (possibly tomorrow or Monday)    Infectious Disease:   - monitor off abx, WBC downtrending  - s/p 8-day course of Zosyn (5/8-5/16) for possible aspiration  - Low grade fever, if persistent, send COVID19     Endocrine:   #T2DM  - NPH switched to 12u q6h  - mod ISS  - continue to monitor FS q6h    Lines/Tubes:  - Arterial line left radial (5/13)  - Central line (5/13) 75 yo M w/ hx of CAD s/p CABG, T2DM, HTN, HLD, metastatic Prostate Cancer on chemo admitted 5/8 for bilateral lower extremity weakness r/o for cord compression, found to have metastatic disease of sacral and illiac crest, c/b intermittent agitation with CTH with no new acute pathology, postrenal and prerenal MARJORIE on CKD III, and with intermittent courses of empiric abx, and acute hypoxic respiratory failure, unimproved on Bipap possibly 2/2 suspected PE with severe pulm HTN vs pna vs fluid overload. RRT 5/13 s/p intubation/central line/fidencio and requiring course of pressors while on sedation now on levo, hep gtt, precedex, pantoprazole IVP, dilaudid PRN for pain now titrating free water for hyper NA    Neurologic:  - no longer sedated with Precedex   - Tylenol/Dilaudid PRN for pain    Respiratory:  #Acute hypoxic respiratory failure, possibly 2/2 suspected PE   - Intubated, currently on mechanical ventilation settings 450/20/5/30  - Monitor ABGs  - Failed CPAP trials on 5/15 and 5/16  - When ready, will plan to extubate to BiPAP    Cardiovascular:   #Hx of HTN and CAD s/p CABG  - TTE (5/12): EF 60-65%; minimal MS, mod AS, minimal AR, eccentric LVH; grossly normal LVSF, decreased RVSF, severe pulm HTN  - able to tolerate off pressors while off sedations, but currently requiring levo and vaso gtt while on sedation  - continue to monitor BP  - a-line for monitoring  - pro-BNP elevated to 4459 (on 5/14); f/u repeat pro-BNP in AM  - s/p Lasix 40mg IV x1 on 5/15; x1 on 5/16, will monitor UOP and consider additional Lasix if needed  - holding home PO cardiac meds for now    Gastrointestinal/Nutrition:   - NPO with tube feeds  - PPI 40mg IV daily for prophylaxis     Renal/Genitourinary:   #MARJORIE, improving  - Cr up to 3.7, now downtrending, continue to monitor (Cr ~2 at presentation and ~1.2 in 2017)  - Monitor UOP, consider additional diuresis PRN  - Monitor electrolytes and replete PRN     Hematologic:   #Suspected PE  - c/w heparin gtt  - Hgb downtrending slightly, but no signs of active bleeding  - Continue to monitor CBC, PTT, INR  - b/l LE duplex (5/11): negative for DVTs  - plan for V/Q scan when more stable (possibly tomorrow or Monday)    Infectious Disease:   - monitor off abx, WBC downtrending  - s/p 8-day course of Zosyn (5/8-5/16) for possible aspiration  - Low grade fever, if persistent, send COVID19     Endocrine:   #T2DM  - NPH switched to 12u q6h  - mod ISS  - continue to monitor FS q6h    Lines/Tubes:  - Arterial line left radial (5/13)  - Central line (5/13) 73 yo M w/ hx of CAD s/p CABG, T2DM, HTN, HLD, metastatic Prostate Cancer on chemo admitted 5/8 for bilateral lower extremity weakness r/o for cord compression, found to have metastatic disease of sacral and illiac crest, c/b intermittent agitation with CTH with no new acute pathology, postrenal and prerenal MARJORIE on CKD III, and with intermittent courses of empiric abx, and acute hypoxic respiratory failure, unimproved on Bipap possibly 2/2 suspected PE with severe pulm HTN vs pna vs fluid overload. RRT 5/13 s/p intubation/central line/fidencio and requiring course of pressors while on sedation now on levo, hep gtt, precedex, pantoprazole IVP, dilaudid PRN for pain now titrating free water for hyper NA    Neurologic:  - no longer sedated with Precedex   - Tylenol/Dilaudid PRN for pain    Respiratory:  #Acute hypoxic respiratory failure, possibly 2/2 suspected PE vs pulm HTN vs pulm edema  extubated now well on NC    Cardiovascular:   #Hx of HTN and CAD s/p CABG  - TTE (5/12): EF 60-65%; minimal MS, mod AS, minimal AR, eccentric LVH; grossly normal LVSF, decreased RVSF, severe pulm HTN  - able to tolerate off pressors while off sedations, but currently requiring levo and vaso gtt while on sedation  - continue to monitor BP  - a-line for monitoring  - pro-BNP elevated to 4459 (on 5/14); f/u repeat pro-BNP in AM  - s/p Lasix 40mg IV x1 on 5/15; x1 on 5/16, will monitor UOP and consider additional Lasix if needed, will give IV 40 today  - holding home PO cardiac meds for now    Gastrointestinal/Nutrition:   - NPO until dysphagia screen after oral secretions decrease  - PPI 40mg IV daily for prophylaxis     Renal/Genitourinary:   #MARJORIE, improving  - Cr up to 3.7, now downtrending, continue to monitor (Cr ~2 at presentation and ~1.2 in 2017)  - Monitor UOP, consider additional diuresis PRN  - Monitor electrolytes and replete PRN  -stable at 1.6     Hematologic:   #Suspected PE  - b/l LE duplex (5/11): negative for DVTs  - V/Q scan low probability of PE now on Hep subq for ppx     Infectious Disease:   - monitor off abx, WBC downtrending  - s/p 8-day course of Zosyn (5/8-5/16) for possible aspiration  - no longer Low grade fever  - covid neg x4  - with some oral thrush, started fluconazole    Endocrine:   #T2DM  - NPH switched to 12u q6h  - mod ISS  - continue to monitor FS q6h    Lines/Tubes: will obtain access and then discontinue  - Arterial line left radial (5/13)  - Central line (5/13)

## 2020-05-19 NOTE — CHART NOTE - NSCHARTNOTEFT_GEN_A_CORE
Nutrition Follow Up Note     Patient seen for: nutrition follow up on SICU    Source: medical record. Pt extubated 5/19    Chart reviewed, events noted. 74-yo M w/ PMH of CAD s/p CABG, prostate CA on chemo, T2DM, HTN, and HLD, p/w bilateral lower extremity weakness, consulted for persistent leukocytosis despite antibiotics.    Diet Order: Diet, Consistent Carbohydrate w/Evening Snack:   Regular  DASH/TLC {Sodium & Cholesterol Restricted} (DASH) (05-19-20 @ 13:05)    Nutrition Events:   - Pt previously tolerating VitalAF at goal 70 ml/hr x 24 hrs; received and average of 55% of goal over past 4 days  - Pt extubated today and diet advanced  - Hyperglycemia addressed with NPH 14 units q6 hrs (while on EN) and Humalog sliding scale    - Stage III CKD noted  - Current wt 98.7 kg (5/19) indicates possible 7.9 kg weight loss. RD will continue to trend.    GI: Rectal Tub output 300ml (5/18), 650ml (5/19). Bowel regimen: none    Anthropometric Measurements:   Height (cm): 172.72 (05-08-20 @ 11:36)  Weight (kg): 106.6 (05-08-20 @ 11:36); current wt 98.7 kg (5/19)   BMI (kg/m2): 35.7 (05-08-20 @ 11:36)  IBW: 70 kg    Medications: MEDICATIONS  (STANDING):  dexMEDEtomidine Infusion 0.1 MICROgram(s)/kG/Hr (2.67 mL/Hr) IV Continuous <Continuous>  epoetin-sandeep-epbx (RETACRIT) Injectable 40651 Unit(s) SubCutaneous <User Schedule>  heparin   Injectable 5000 Unit(s) SubCutaneous every 8 hours  insulin lispro (HumaLOG) corrective regimen sliding scale   SubCutaneous three times a day with meals  insulin NPH human recombinant 14 Unit(s) SubCutaneous every 6 hours  norepinephrine Infusion 0.05 MICROgram(s)/kG/Min (9.99 mL/Hr) IV Continuous <Continuous>  pantoprazole  Injectable 40 milliGRAM(s) IV Push daily    MEDICATIONS  (PRN):  glucagon  Injectable 1 milliGRAM(s) IntraMuscular once PRN Glucose LESS THAN 70 milligrams/deciliter  HYDROmorphone  Injectable 0.5 milliGRAM(s) IV Push every 3 hours PRN pain    Labs: 05-19 @ 12:19: Sodium 150<H>, Potassium 4.1, Calcium 8.5, Magnesium --, Phosphorus --, BUN 49<H>, Creatinine 1.46<H>, Glucose 145<H>, Alk Phos 157<H>, ALT/SGPT 11, AST/SGOT 19, Albumin 2.0<L>, Total Bilirubin 0.2, Creatine Kinase <<27>  05-19 @ 06:41: Sodium 152<H>, Potassium 3.8, Calcium 9.0, Magnesium 2.1, Phosphorus 3.7, BUN 53<H>, Creatinine 1.60<H>, Glucose 138<H>, Alk Phos 139<H>, ALT/SGPT 9<L>, AST/SGOT 12, Albumin 2.2<L>, Creatine Kinase <<27>  05-19 @ 00:55: Sodium 148<H>, Potassium 3.8, Calcium 8.5, Magnesium 2.0, Phosphorus 3.3, BUN 56<H>, Creatinine 1.63<H>, Glucose 161<H>, Alk Phos 142<H>, ALT/SGPT 8<L>, AST/SGOT 13, Albumin 2.2<L>, Total Bilirubin <0.1<L>, Hemoglobin 7.9<L>, Hematocrit 26.0<L>, , Creatine Kinase <<27>    POCT Blood Glucose.: 144 mg/dL (05-19-20 @ 10:06)  POCT Blood Glucose.: 75 mg/dL (05-19-20 @ 06:01)  POCT Blood Glucose.: 80 mg/dL (05-19-20 @ 05:56)  POCT Blood Glucose.: 147 mg/dL (05-18-20 @ 23:03)  POCT Blood Glucose.: 161 mg/dL (05-18-20 @ 16:56)    Skin per nursing documentation: no pressure injuries documented  Edema: 1+ generalized    Estimated Needs:   Energy: 9242-7234 calories (20-25 johanne/kg based on 5/19 wt 98.7 kg with consideration for extubation)  Protein: 112-126 grams (1.6-1.8 gm/kg based on IBW 70 kg)    Previous Nutrition Diagnosis: Increased Nutrient Needs  Nutrition Diagnosis is: ongoing, addressed with diet advancement    New Nutrition Diagnosis:  none    Recommended Interventions:   1. Continue Consistent Carbohydrate, DASH diet with snack.   2.   3.  4.       Monitoring and Evaluation:   Continue to monitor nutrition provision and tolerance, weights, labs, skin integrity.   RD remains available upon request and will follow up per protocol.    Nilsa Loya MS RD CDN [facility]; Pager # [phone] Nutrition Follow Up Note     Patient seen for: nutrition follow up on SICU    Source: patient, medical record. Pt extubated 5/19    Chart reviewed, events noted. "74-yo M w/ PMH of CAD s/p CABG, prostate CA on chemo, T2DM, HTN, and HLD, p/w bilateral lower extremity weakness, consulted for persistent leukocytosis despite antibiotics."    Diet Order: Diet, Consistent Carbohydrate w/Evening Snack:   Regular  DASH/TLC {Sodium & Cholesterol Restricted} (DASH) (05-19-20 @ 13:05)    Nutrition Events:   - Pt previously tolerating VitalAF at goal 70 ml/hr x 24 hrs; received and average of 55% of goal over past 4 days  - Pt extubated today and diet advanced. Pt observed eating lunch with good appetite. Pt is amenable to No Sugar Added Mighty Shake supplements (200 calories, 7 Gm protein per serving).  - Hyperglycemia addressed with NPH 14 units q6 hrs (while on EN) and Humalog sliding scale    - Stage III CKD noted  - Current wt 98.7 kg (5/19) indicates possible 7.9 kg weight loss since admission. RD will continue to trend.  - Nutrition Education: not appropriate at this time; pt eating first meal post-extubation    GI: Rectal Tub output 300ml (5/18), 650ml (5/19). Bowel regimen: none    Anthropometric Measurements:   Height (cm): 172.72 (05-08-20 @ 11:36)  Weight (kg): 106.6 (05-08-20 @ 11:36); current wt 98.7 kg (5/19)   BMI (kg/m2): 35.7 (05-08-20 @ 11:36)  IBW: 70 kg    Medications: MEDICATIONS  (STANDING):  dexMEDEtomidine Infusion 0.1 MICROgram(s)/kG/Hr (2.67 mL/Hr) IV Continuous <Continuous>  epoetin-sandeep-epbx (RETACRIT) Injectable 17885 Unit(s) SubCutaneous <User Schedule>  heparin   Injectable 5000 Unit(s) SubCutaneous every 8 hours  insulin lispro (HumaLOG) corrective regimen sliding scale   SubCutaneous three times a day with meals  insulin NPH human recombinant 14 Unit(s) SubCutaneous every 6 hours  norepinephrine Infusion 0.05 MICROgram(s)/kG/Min (9.99 mL/Hr) IV Continuous <Continuous>  pantoprazole  Injectable 40 milliGRAM(s) IV Push daily    MEDICATIONS  (PRN):  glucagon  Injectable 1 milliGRAM(s) IntraMuscular once PRN Glucose LESS THAN 70 milligrams/deciliter  HYDROmorphone  Injectable 0.5 milliGRAM(s) IV Push every 3 hours PRN pain    Labs: 05-19 @ 12:19: Sodium 150<H>, Potassium 4.1, Calcium 8.5, Magnesium --, Phosphorus --, BUN 49<H>, Creatinine 1.46<H>, Glucose 145<H>, Alk Phos 157<H>, ALT/SGPT 11, AST/SGOT 19, Albumin 2.0<L>, Total Bilirubin 0.2, Creatine Kinase <<27>  05-19 @ 06:41: Sodium 152<H>, Potassium 3.8, Calcium 9.0, Magnesium 2.1, Phosphorus 3.7, BUN 53<H>, Creatinine 1.60<H>, Glucose 138<H>, Alk Phos 139<H>, ALT/SGPT 9<L>, AST/SGOT 12, Albumin 2.2<L>, Creatine Kinase <<27>  05-19 @ 00:55: Sodium 148<H>, Potassium 3.8, Calcium 8.5, Magnesium 2.0, Phosphorus 3.3, BUN 56<H>, Creatinine 1.63<H>, Glucose 161<H>, Alk Phos 142<H>, ALT/SGPT 8<L>, AST/SGOT 13, Albumin 2.2<L>, Total Bilirubin <0.1<L>, Hemoglobin 7.9<L>, Hematocrit 26.0<L>, , Creatine Kinase <<27>    POCT Blood Glucose.: 144 mg/dL (05-19-20 @ 10:06)  POCT Blood Glucose.: 75 mg/dL (05-19-20 @ 06:01)  POCT Blood Glucose.: 80 mg/dL (05-19-20 @ 05:56)  POCT Blood Glucose.: 147 mg/dL (05-18-20 @ 23:03)  POCT Blood Glucose.: 161 mg/dL (05-18-20 @ 16:56)    Skin per nursing documentation: no pressure injuries documented  Edema: 1+ generalized    Estimated Needs:   Energy: 6901-4612 calories (20-25 johanne/kg based on 5/19 wt 98.7 kg with consideration for extubation)  Protein: 112-126 grams (1.6-1.8 gm/kg based on IBW 70 kg)    Previous Nutrition Diagnosis: Increased Nutrient Needs  Nutrition Diagnosis is: ongoing, addressed with diet advancement    New Nutrition Diagnosis:  none    Recommended Interventions:   1. Continue Consistent Carbohydrate, DASH diet with snack.   2. No Sugar Added Mighty Shake supplement (200 calories, 7 Gm protein) added to meal trays.  3. RD will follow up with diet education when appropriate.  4. Monitor weight trends.      Monitoring and Evaluation:   Continue to monitor nutrition provision and tolerance, weights, labs, skin integrity.   RD remains available upon request and will follow up per protocol.    Nilsa Loya MS RD CDN [facility]; Pager # [phone]

## 2020-05-19 NOTE — PROGRESS NOTE ADULT - SUBJECTIVE AND OBJECTIVE BOX
RUSTY ARANDA 74y [id number]    Patient is a 74y old  Male who presents with a chief complaint of Weakness (19 May 2020 06:14)      Follow Up/CC:  ID following for fever    Interval History/ROS: extubated this morning, on O2 mask, no fever    Allergies    No Known Allergies    Intolerances        ANTIMICROBIALS:  fluconAZOLE IVPB 400 once      MEDICATIONS  (STANDING):  dexMEDEtomidine Infusion 0.1 MICROgram(s)/kG/Hr (2.67 mL/Hr) IV Continuous <Continuous>  epoetin-sandeep-epbx (RETACRIT) Injectable 76342 Unit(s) SubCutaneous <User Schedule>  fluconAZOLE IVPB 400 milliGRAM(s) IV Intermittent once  heparin   Injectable 5000 Unit(s) SubCutaneous every 8 hours  insulin lispro (HumaLOG) corrective regimen sliding scale   SubCutaneous every 4 hours  insulin NPH human recombinant 14 Unit(s) SubCutaneous every 6 hours  norepinephrine Infusion 0.05 MICROgram(s)/kG/Min (9.99 mL/Hr) IV Continuous <Continuous>  pantoprazole  Injectable 40 milliGRAM(s) IV Push daily    MEDICATIONS  (PRN):  glucagon  Injectable 1 milliGRAM(s) IntraMuscular once PRN Glucose LESS THAN 70 milligrams/deciliter  HYDROmorphone  Injectable 0.5 milliGRAM(s) IV Push every 3 hours PRN pain        Vital Signs Last 24 Hrs  T(C): 36.7 (19 May 2020 07:00), Max: 37.4 (18 May 2020 23:00)  T(F): 98 (19 May 2020 07:00), Max: 99.4 (18 May 2020 23:00)  HR: 99 (19 May 2020 09:32) (47 - 109)  BP: --  BP(mean): --  RR: 25 (19 May 2020 09:00) (15 - 39)  SpO2: 100% (19 May 2020 09:32) (92% - 100%)    CBC Full  -  ( 19 May 2020 00:55 )  WBC Count : 17.41 K/uL  RBC Count : 2.71 M/uL  Hemoglobin : 7.9 g/dL  Hematocrit : 26.0 %  Platelet Count - Automated : 240 K/uL  Mean Cell Volume : 95.9 fl  Mean Cell Hemoglobin : 29.2 pg  Mean Cell Hemoglobin Concentration : 30.4 gm/dL  Auto Neutrophil # : x  Auto Lymphocyte # : x  Auto Monocyte # : x  Auto Eosinophil # : x  Auto Basophil # : x  Auto Neutrophil % : x  Auto Lymphocyte % : x  Auto Monocyte % : x  Auto Eosinophil % : x  Auto Basophil % : x    05-19    152<H>  |  116<H>  |  53<H>  ----------------------------<  138<H>  3.8   |  23  |  1.60<H>    Ca    9.0      19 May 2020 06:41  Phos  3.7     05-19  Mg     2.1     05-19    TPro  6.1  /  Alb  2.2<L>  /  TBili  0.2  /  DBili  x   /  AST  12  /  ALT  9<L>  /  AlkPhos  139<H>  05-19    LIVER FUNCTIONS - ( 19 May 2020 06:41 )  Alb: 2.2 g/dL / Pro: 6.1 g/dL / ALK PHOS: 139 U/L / ALT: 9 U/L / AST: 12 U/L / GGT: x               MICROBIOLOGY:  .Sputum Sputum  05-14-20   No growth  --    Numerous polymorphonuclear leukocytes per low power field  Few Squamous epithelial cells per low power field  No organisms seen per oil power field      .Blood Blood-Peripheral  05-13-20   No Growth Final  --  --      .Blood Blood-Peripheral  05-13-20   No Growth Final  --  --      .Blood Blood-Peripheral  05-08-20   No Growth Final  --  --      .Urine Clean Catch (Midstream)  05-08-20   No growth  --  --      COVID-19 PCR . (05.15.20 @ 15:54)    COVID-19 PCR: NotDetec: This test has been validated by [facility] to be accurate;  though it has not been FDA cleared/approved by the usual pathway.  As with all laboratory tests, results should be correlated with clinical  findings.  https://www.fda.gov/media/502499/download  https://www.fda.gov/media/540160/download        RADIOLOGY    < from: NM Pulmonary Perfusion Scan (05.18.20 @ 10:10) >  Very low probability of pulmonary embolus.    < from: CT Pelvis Bony Only No Cont (05.08.20 @ 17:56) >  Multiple sclerotic lesions in the bony pelvis as described above, concerning for metastatic disease. No acute displaced fracture is seen.    Nonspecific focal increased attenuation measuring approximately 3.7 cm in the left gluteus jessica muscle measuring 43 Hounsfield units, question intramuscular hematoma. Correlate clinically.    Small nonspecific bilateral iliac chain lymph nodes and small para-aortic lymph nodes.     No free fluid is seen in the pelvis.    Small sclerotic lesion in the left pedicle at L4 measuring up to 8 mm.    < end of copied text >

## 2020-05-19 NOTE — PROGRESS NOTE ADULT - PROBLEM SELECTOR PLAN 1
-prob from metastatic cancer/resp failure, ?aspiration   -cx negative  -increased today  -monitor cbc

## 2020-05-19 NOTE — CHART NOTE - NSCHARTNOTEFT_GEN_A_CORE
MICU Transfer Note    Transfer from: MICU  Transfer to:  (x) Medicine    (  ) Telemetry    (  ) RCU    (  ) Palliative    (  ) Stroke Unit    (  ) _______________  Accepting physician: Dr. Jerson Maradiaga      MICU COURSE:  75 yo M w/ hx of CAD s/p CABG, T2DM, HTN, HLD, metastatic Prostate Cancer on chemo admitted 5/8 for bilateral lower extremity weakness r/o for cord compression, found to have metastatic disease of sacral and illiac crest, c/b intermittent agitation with CTH with no new acute pathology, postrenal and prerenal MARJORIE on CKD III, and with intermittent courses of empiric abx, and acute hypoxic respiratory failure, unimproved on Bipap possibly 2/2 suspected PE with severe pulm HTN vs pna vs fluid overload. RRT 5/13 s/p intubation/central line/fidencio and requiring course of pressors while on sedation now on levo, hep gtt, precedex, pantoprazole IVP, dilaudid PRN for pain was titrating free water for hyper NA, CPAP welled, extubated 5/19 w/o complication, secretion decreased, satting well on RA passed dysphagia screen, resumed diet, started fluconazole for oral thrush, d/roverto central, a line now stable for floors        ASSESSMENT & PLAN:   Neurologic:  - no longer sedated with Precedex, mentating well   - Tylenol/Dilaudid PRN for pain  - has severe anxiety when undergoing closed tests  - ao 2-3, spoke with daughter close to baseline with some minimal confusion    Respiratory:  #Acute hypoxic respiratory failure, possibly 2/2 suspected PE vs pulm HTN vs pulm edema  extubated now well on NC    Cardiovascular:   #Hx of HTN and CAD s/p CABG  - TTE (5/12): EF 60-65%; minimal MS, mod AS, minimal AR, eccentric LVH; grossly normal LVSF, decreased RVSF, severe pulm HTN  - able to tolerate off pressors while off sedations, but currently requiring levo and vaso gtt while on sedation  - continue to monitor BP  - a-line for monitoring  - pro-BNP elevated to 4459 (on 5/14); f/u repeat pro-BNP in AM  - s/p Lasix 40mg IV x1 on 5/15; x1 on 5/16, will monitor UOP and consider additional Lasix if needed, will give IV 40 today  - restart home PO cardiac meds  - will need to continue to diurese    Gastrointestinal/Nutrition:   - DASH TLC  - PPI 40mg IV daily for prophylaxis     Renal/Genitourinary:   #MARJORIE, improving  - Cr up to 3.7, now downtrending, continue to monitor (Cr ~2 at presentation and ~1.2 in 2017)  - Monitor UOP, consider additional diuresis PRN  - Monitor electrolytes and replete PRN  -stable at 1.6    #HyperNatremia  -was titrating free water, now resumed diet and Na correcting     Hematologic:   #Suspected PE  - b/l LE duplex (5/11): negative for DVTs  - V/Q scan low probability of PE   - now on Hep subq for ppx     Infectious Disease:   - monitor off abx, WBC downtrending  - s/p 8-day course of Zosyn (5/8-5/16) for possible aspiration  - no longer Low grade fever  - covid neg x4  - with some oral thrush, started fluconazole, can transition to nystatin swish and spit    Endocrine:   #T2DM  - NPH switched to 14u q6h, now switching to weight based insulin  - mod ISS  - continue to monitor FS q6h    Lines/Tubes: still with mccann in will need TOV        For Follow-Up:  [] resume home meds as tolerated  [] continue gentle diuresis given severe pulm htn  [] PT consult  [] TOV to remove mccann  [] hypernatremia; monitor and start fluids as necessary  [] titrate insulin as necessary        Vital Signs Last 24 Hrs  T(C): 36.6 (19 May 2020 15:00), Max: 37.4 (18 May 2020 23:00)  T(F): 97.8 (19 May 2020 15:00), Max: 99.4 (18 May 2020 23:00)  HR: 90 (19 May 2020 16:00) (51 - 109)  BP: 152/70 (19 May 2020 16:00) (152/70 - 152/70)  BP(mean): 100 (19 May 2020 16:00) (100 - 100)  RR: 27 (19 May 2020 16:00) (15 - 38)  SpO2: 99% (19 May 2020 16:00) (95% - 100%)  I&O's Summary    18 May 2020 07:01  -  19 May 2020 07:00  --------------------------------------------------------  IN: 1625.1 mL / OUT: 1750 mL / NET: -124.9 mL    19 May 2020 07:01  -  19 May 2020 16:29  --------------------------------------------------------  IN: 210.6 mL / OUT: 1465 mL / NET: -1254.4 mL          MEDICATIONS  (STANDING):  epoetin-sandeep-epbx (RETACRIT) Injectable 64334 Unit(s) SubCutaneous <User Schedule>  heparin   Injectable 5000 Unit(s) SubCutaneous every 8 hours  insulin lispro (HumaLOG) corrective regimen sliding scale   SubCutaneous three times a day with meals  insulin NPH human recombinant 14 Unit(s) SubCutaneous every 6 hours  pantoprazole  Injectable 40 milliGRAM(s) IV Push daily    MEDICATIONS  (PRN):  glucagon  Injectable 1 milliGRAM(s) IntraMuscular once PRN Glucose LESS THAN 70 milligrams/deciliter  HYDROmorphone  Injectable 0.5 milliGRAM(s) IV Push every 3 hours PRN pain        LABS                                            7.9                   Neurophils% (auto):   x      (05-19 @ 00:55):    17.41)-----------(240          Lymphocytes% (auto):  x                                             26.0                   Eosinphils% (auto):   x        Manual%: Neutrophils x    ; Lymphocytes x    ; Eosinophils x    ; Bands%: x    ; Blasts x                                    150    |  114    |  49                  Calcium: 8.5   / iCa: x      (05-19 @ 12:19)    ----------------------------<  145       Magnesium: x                                4.1     |  21     |  1.46             Phosphorous: x        TPro  6.5    /  Alb  2.0    /  TBili  0.2    /  DBili  x      /  AST  19     /  ALT  11     /  AlkPhos  157    19 May 2020 12:19    ( 05-19 @ 00:55 )   PT: 13.6 sec;   INR: 1.18 ratio  aPTT: 80.5 sec      Authored by Dr. Lamin Dotson, [id number], [facility] [id number] MICU Transfer Note    Transfer from: MICU  Transfer to:  (x) Medicine    (  ) Telemetry    (  ) RCU    (  ) Palliative    (  ) Stroke Unit    (  ) _______________  Accepting physician: Dr. Jerson Maradiaga      MICU COURSE:  75 yo M w/ hx of CAD s/p CABG, T2DM, HTN, HLD, metastatic Prostate Cancer on chemo admitted 5/8 for bilateral lower extremity weakness r/o for cord compression, found to have metastatic disease of sacral and illiac crest, c/b intermittent agitation with CTH with no new acute pathology, postrenal and prerenal MARJORIE on CKD III, and with intermittent courses of empiric abx, and acute hypoxic respiratory failure, unimproved on Bipap possibly 2/2 suspected PE with severe pulm HTN vs pna vs fluid overload. RRT 5/13 s/p intubation/central line/fidencio and requiring course of pressors while on sedation now on levo, hep gtt, precedex, pantoprazole IVP, dilaudid PRN for pain was titrating free water for hyper NA, CPAP welled, extubated 5/19 w/o complication, secretion decreased, satting well on RA passed dysphagia screen, resumed diet, started fluconazole for oral thrush, d/roverto central, a line now stable for floors        ASSESSMENT & PLAN:   Neurologic:  - no longer sedated with Precedex, mentating well   - Tylenol/Dilaudid PRN for pain  - has severe anxiety when undergoing closed tests  - ao 2-3, spoke with daughter close to baseline with some minimal confusion    Respiratory:  #Acute hypoxic respiratory failure, possibly 2/2 suspected PE vs pulm HTN vs pulm edema  extubated now well on NC    Cardiovascular:   #Hx of HTN and CAD s/p CABG  - TTE (5/12): EF 60-65%; minimal MS, mod AS, minimal AR, eccentric LVH; grossly normal LVSF, decreased RVSF, severe pulm HTN  - able to tolerate off pressors while off sedations, but currently requiring levo and vaso gtt while on sedation  - continue to monitor BP  - a-line for monitoring  - pro-BNP elevated to 4459 (on 5/14); f/u repeat pro-BNP in AM  - s/p Lasix 40mg IV x1 on 5/15; x1 on 5/16, will monitor UOP and consider additional Lasix if needed, will give IV 40 today  - restart home PO cardiac meds  - will need to continue to diurese    Gastrointestinal/Nutrition:   - DASH TLC  - PPI 40mg IV daily for prophylaxis     Renal/Genitourinary:   #MARJORIE, improving  - Cr up to 3.7, now downtrending, continue to monitor (Cr ~2 at presentation and ~1.2 in 2017)  - Monitor UOP, consider additional diuresis PRN  - Monitor electrolytes and replete PRN  -stable at 1.6    #HyperNatremia  -was titrating free water, now resumed diet and Na correcting     Hematologic:   #Suspected PE  - b/l LE duplex (5/11): negative for DVTs  - V/Q scan low probability of PE   - now on Hep subq for ppx     Infectious Disease:   - monitor off abx, WBC downtrending  - s/p 8-day course of Zosyn (5/8-5/16) for possible aspiration  - no longer Low grade fever  - covid neg x4  - with some oral thrush, started fluconazole, can transition to nystatin swish and spit    Endocrine:   #T2DM  - NPH switched to 14u q6h, now switching to weight based insulin which would be 63 TIDD for 30u lantus and 10 units TIDAC, will instead start on 25 Lantus qHs and 7 untis TIDAC Lispro with low ISS    Lines/Tubes: still with mccann in will need TOV        For Follow-Up:  [] resume home meds as tolerated  [] continue gentle diuresis given severe pulm htn  [] PT consult  [] TOV to remove mccann  [] hypernatremia; monitor and start fluids as necessary can check q8, although would be sparing with fluid as patient is volume overloaded  [] titrate insulin as necessary        Vital Signs Last 24 Hrs  T(C): 36.6 (19 May 2020 15:00), Max: 37.4 (18 May 2020 23:00)  T(F): 97.8 (19 May 2020 15:00), Max: 99.4 (18 May 2020 23:00)  HR: 90 (19 May 2020 16:00) (51 - 109)  BP: 152/70 (19 May 2020 16:00) (152/70 - 152/70)  BP(mean): 100 (19 May 2020 16:00) (100 - 100)  RR: 27 (19 May 2020 16:00) (15 - 38)  SpO2: 99% (19 May 2020 16:00) (95% - 100%)  I&O's Summary    18 May 2020 07:01  -  19 May 2020 07:00  --------------------------------------------------------  IN: 1625.1 mL / OUT: 1750 mL / NET: -124.9 mL    19 May 2020 07:01  -  19 May 2020 16:29  --------------------------------------------------------  IN: 210.6 mL / OUT: 1465 mL / NET: -1254.4 mL          MEDICATIONS  (STANDING):  epoetin-sandeep-epbx (RETACRIT) Injectable 37706 Unit(s) SubCutaneous <User Schedule>  heparin   Injectable 5000 Unit(s) SubCutaneous every 8 hours  insulin lispro (HumaLOG) corrective regimen sliding scale   SubCutaneous three times a day with meals  insulin NPH human recombinant 14 Unit(s) SubCutaneous every 6 hours  pantoprazole  Injectable 40 milliGRAM(s) IV Push daily    MEDICATIONS  (PRN):  glucagon  Injectable 1 milliGRAM(s) IntraMuscular once PRN Glucose LESS THAN 70 milligrams/deciliter  HYDROmorphone  Injectable 0.5 milliGRAM(s) IV Push every 3 hours PRN pain        LABS                                            7.9                   Neurophils% (auto):   x      (05-19 @ 00:55):    17.41)-----------(240          Lymphocytes% (auto):  x                                             26.0                   Eosinphils% (auto):   x        Manual%: Neutrophils x    ; Lymphocytes x    ; Eosinophils x    ; Bands%: x    ; Blasts x                                    150    |  114    |  49                  Calcium: 8.5   / iCa: x      (05-19 @ 12:19)    ----------------------------<  145       Magnesium: x                                4.1     |  21     |  1.46             Phosphorous: x        TPro  6.5    /  Alb  2.0    /  TBili  0.2    /  DBili  x      /  AST  19     /  ALT  11     /  AlkPhos  157    19 May 2020 12:19    ( 05-19 @ 00:55 )   PT: 13.6 sec;   INR: 1.18 ratio  aPTT: 80.5 sec      Authored by Dr. Lamin Dotson, -[id number], [id number]

## 2020-05-19 NOTE — CHART NOTE - NSCHARTNOTEFT_GEN_A_CORE
[facility] MICU TRANSFER NOTE    Please refer to MICU transfer note for full details.    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 RRT 5/13 s/p intubation/central line/fidencio and requiring course of pressors while on sedation now on levo, hep gtt, precedex, pantoprazole IVP, dilaudid PRN for pain was titrating free water for hyper NA, CPAP welled, extubated 5/19 w/o complication, secretion decreased, satting well on RA passed dysphagia screen, resumed diet, started fluconazole for oral thrush, d/roverto central, a line now stable for floors.         Vital Signs Last 24 Hrs  T(C): 36.7 (19 May 2020 23:13), Max: 37.2 (19 May 2020 03:00)  T(F): 98 (19 May 2020 23:13), Max: 99 (19 May 2020 03:00)  HR: 86 (19 May 2020 23:13) (51 - 116)  BP: 146/84 (19 May 2020 23:13) (144/68 - 181/77)  BP(mean): 146 (19 May 2020 22:00) (98 - 146)  RR: 22 (19 May 2020 23:13) (15 - 38)  SpO2: 95% (19 May 2020 23:13) (93% - 100%)  I&O's Summary    18 May 2020 07:01  -  19 May 2020 07:00  --------------------------------------------------------  IN: 1625.1 mL / OUT: 1750 mL / NET: -124.9 mL    19 May 2020 07:01  -  19 May 2020 23:59  --------------------------------------------------------  IN: 210.6 mL / OUT: 1925 mL / NET: -1714.4 mL      Allergies    No Known Allergies    Intolerances      MEDICATIONS  (STANDING):  carvedilol 6.25 milliGRAM(s) Oral every 12 hours  dextrose 5%. 1000 milliLiter(s) (50 mL/Hr) IV Continuous <Continuous>  dextrose 50% Injectable 12.5 Gram(s) IV Push once  dextrose 50% Injectable 25 Gram(s) IV Push once  dextrose 50% Injectable 25 Gram(s) IV Push once  epoetin-sandeep-epbx (RETACRIT) Injectable 79031 Unit(s) SubCutaneous <User Schedule>  heparin   Injectable 5000 Unit(s) SubCutaneous every 8 hours  insulin glargine Injectable (LANTUS) 15 Unit(s) SubCutaneous at bedtime  insulin lispro (HumaLOG) corrective regimen sliding scale   SubCutaneous three times a day before meals  insulin lispro (HumaLOG) corrective regimen sliding scale   SubCutaneous at bedtime  insulin lispro Injectable (HumaLOG) 7 Unit(s) SubCutaneous three times a day before meals  pantoprazole  Injectable 40 milliGRAM(s) IV Push daily    MEDICATIONS  (PRN):  dextrose 40% Gel 15 Gram(s) Oral once PRN Blood Glucose LESS THAN 70 milliGRAM(s)/deciliter  glucagon  Injectable 1 milliGRAM(s) IntraMuscular once PRN Glucose LESS THAN 70 milligrams/deciliter  HYDROmorphone  Injectable 0.5 milliGRAM(s) IV Push every 3 hours PRN pain                 7.9    17.41 )-----------( 240      ( 19 May 2020 00:55 )             26.0     05-19    150<H>  |  114<H>  |  46<H>  ----------------------------<  118<H>  3.5   |  25  |  1.58<H>    Ca    8.7      19 May 2020 18:29  Phos  3.7     05-19  Mg     2.1     05-19    TPro  6.3  /  Alb  2.3<L>  /  TBili  0.2  /  DBili  x   /  AST  16  /  ALT  11  /  AlkPhos  155<H>  05-19    PT/INR - ( 19 May 2020 00:55 )   PT: 13.6 sec;   INR: 1.18 ratio         PTT - ( 19 May 2020 00:55 )  PTT:80.5 sec      ASSESSMENT & PLAN:    SEE MICU NOTE FOR FURTHER DETAILS.     FOR FOLLOW UP:  [] resume home meds as tolerated  [] continue gentle diuresis given severe pulm htn  [] PT consult  [] TOV to remove mccann  [] hypernatremia;  consider starting D5 or 1/2 ns, monitor hyperchloremia.   [] titrate insulin as necessary  [] Monitor bowel movements while on opiates [facility] MICU TRANSFER NOTE    Please refer to MICU transfer note for full details.    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 RRT 5/13 s/p intubation/central line/fidencio and requiring course of pressors while on sedation now on levo, hep gtt, precedex, pantoprazole IVP, dilaudid PRN for pain was titrating free water for hyper NA, CPAP welled, extubated 5/19 w/o complication, secretion decreased, satting well on RA passed dysphagia screen, resumed diet, started fluconazole for oral thrush, d/roverto central, a line now stable for floors.         Vital Signs Last 24 Hrs  T(C): 36.7 (19 May 2020 23:13), Max: 37.2 (19 May 2020 03:00)  T(F): 98 (19 May 2020 23:13), Max: 99 (19 May 2020 03:00)  HR: 86 (19 May 2020 23:13) (51 - 116)  BP: 146/84 (19 May 2020 23:13) (144/68 - 181/77)  BP(mean): 146 (19 May 2020 22:00) (98 - 146)  RR: 22 (19 May 2020 23:13) (15 - 38)  SpO2: 95% (19 May 2020 23:13) (93% - 100%)  I&O's Summary    18 May 2020 07:01  -  19 May 2020 07:00  --------------------------------------------------------  IN: 1625.1 mL / OUT: 1750 mL / NET: -124.9 mL    19 May 2020 07:01  -  19 May 2020 23:59  --------------------------------------------------------  IN: 210.6 mL / OUT: 1925 mL / NET: -1714.4 mL      Allergies  No Known Allergies  Intolerances      MEDICATIONS  (STANDING):  carvedilol 6.25 milliGRAM(s) Oral every 12 hours  dextrose 5%. 1000 milliLiter(s) (50 mL/Hr) IV Continuous <Continuous>  dextrose 50% Injectable 12.5 Gram(s) IV Push once  dextrose 50% Injectable 25 Gram(s) IV Push once  dextrose 50% Injectable 25 Gram(s) IV Push once  epoetin-sandeep-epbx (RETACRIT) Injectable 11196 Unit(s) SubCutaneous <User Schedule>  heparin   Injectable 5000 Unit(s) SubCutaneous every 8 hours  insulin glargine Injectable (LANTUS) 15 Unit(s) SubCutaneous at bedtime  insulin lispro (HumaLOG) corrective regimen sliding scale   SubCutaneous three times a day before meals  insulin lispro (HumaLOG) corrective regimen sliding scale   SubCutaneous at bedtime  insulin lispro Injectable (HumaLOG) 7 Unit(s) SubCutaneous three times a day before meals  pantoprazole  Injectable 40 milliGRAM(s) IV Push daily    MEDICATIONS  (PRN):  dextrose 40% Gel 15 Gram(s) Oral once PRN Blood Glucose LESS THAN 70 milliGRAM(s)/deciliter  glucagon  Injectable 1 milliGRAM(s) IntraMuscular once PRN Glucose LESS THAN 70 milligrams/deciliter  HYDROmorphone  Injectable 0.5 milliGRAM(s) IV Push every 3 hours PRN pain                 7.9    17.41 )-----------( 240      ( 19 May 2020 00:55 )             26.0     05-19    150<H>  |  114<H>  |  46<H>  ----------------------------<  118<H>  3.5   |  25  |  1.58<H>    Ca    8.7      19 May 2020 18:29  Phos  3.7     05-19  Mg     2.1     05-19    TPro  6.3  /  Alb  2.3<L>  /  TBili  0.2  /  DBili  x   /  AST  16  /  ALT  11  /  AlkPhos  155<H>  05-19    PT/INR - ( 19 May 2020 00:55 )   PT: 13.6 sec;   INR: 1.18 ratio         PTT - ( 19 May 2020 00:55 )  PTT:80.5 sec      ASSESSMENT & PLAN:    SEE MICU NOTE FOR FURTHER DETAILS.     FOR FOLLOW UP:  [ ] Patient has lower extremity weakness, initialy concern for GBS vs cord compression, however his resp status improved without IVIG, no cord compression despite mets ca. see neurosurgery note, consider neurology input for further recommendations for his lower extremity weakness.    [] resume home meds as tolerated  [] continue gentle diuresis given severe pulm htn  [] PT consult  [] TOV to remove mccann  [] hypernatremia;  consider starting D5 or 1/2 ns, monitor hyperchloremia.   [] titrate insulin as necessary  [] Monitor bowel movements while on opiates

## 2020-05-19 NOTE — PROGRESS NOTE ADULT - SUBJECTIVE AND OBJECTIVE BOX
Patient is a 74y Male whom presented to the hospital with ckd and zehra     PAST MEDICAL & SURGICAL HISTORY:  HTN (hypertension)  Prostate cancer  Hyperlipidemia  Neuropathy  Diabetes mellitus, type 2  CAD (coronary artery disease)  History of cholecystectomy  H/O coronary artery bypass surgery      MEDICATIONS  (STANDING):  calcium gluconate IVPB 1 Gram(s) IV Intermittent once  chlorhexidine 0.12% Liquid 15 milliLiter(s) Oral Mucosa every 12 hours  chlorhexidine 4% Liquid 1 Application(s) Topical <User Schedule>  chlorhexidine 4% Liquid 1 Application(s) Topical <User Schedule>  dexMEDEtomidine Infusion 0.1 MICROgram(s)/kG/Hr (2.67 mL/Hr) IV Continuous <Continuous>  dextrose 50% Injectable 50 milliLiter(s) IV Push once  heparin  Infusion.  Unit(s)/Hr (19 mL/Hr) IV Continuous <Continuous>  insulin lispro (HumaLOG) corrective regimen sliding scale   SubCutaneous every 4 hours  insulin regular  human recombinant. 10 Unit(s) IV Push once  lactated ringers. 1000 milliLiter(s) (100 mL/Hr) IV Continuous <Continuous>  norepinephrine Infusion 0.05 MICROgram(s)/kG/Min (9.99 mL/Hr) IV Continuous <Continuous>  pantoprazole  Injectable 40 milliGRAM(s) IV Push daily  piperacillin/tazobactam IVPB.. 3.375 Gram(s) IV Intermittent every 8 hours  vasopressin Infusion 0.03 Unit(s)/Min (1.8 mL/Hr) IV Continuous <Continuous>      Allergies    No Known Allergies    Intolerances        SOCIAL HISTORY:  Denies ETOh,Smoking,     FAMILY HISTORY:  No pertinent family history in first degree relatives      REVIEW OF SYSTEMS:    unable to obtained a good review system                                                    7.9    17.41 )-----------( 240      ( 19 May 2020 00:55 )             26.0       CBC Full  -  ( 19 May 2020 00:55 )  WBC Count : 17.41 K/uL  RBC Count : 2.71 M/uL  Hemoglobin : 7.9 g/dL  Hematocrit : 26.0 %  Platelet Count - Automated : 240 K/uL  Mean Cell Volume : 95.9 fl  Mean Cell Hemoglobin : 29.2 pg  Mean Cell Hemoglobin Concentration : 30.4 gm/dL  Auto Neutrophil # : x  Auto Lymphocyte # : x  Auto Monocyte # : x  Auto Eosinophil # : x  Auto Basophil # : x  Auto Neutrophil % : x  Auto Lymphocyte % : x  Auto Monocyte % : x  Auto Eosinophil % : x  Auto Basophil % : x      05-19    150<H>  |  114<H>  |  49<H>  ----------------------------<  145<H>  4.1   |  21<L>  |  1.46<H>    Ca    8.5      19 May 2020 12:19  Phos  3.7     05-19  Mg     2.1     05-19    TPro  6.5  /  Alb  2.0<L>  /  TBili  0.2  /  DBili  x   /  AST  19  /  ALT  11  /  AlkPhos  157<H>  05-19      CAPILLARY BLOOD GLUCOSE      POCT Blood Glucose.: 121 mg/dL (19 May 2020 14:16)  POCT Blood Glucose.: 144 mg/dL (19 May 2020 10:06)  POCT Blood Glucose.: 75 mg/dL (19 May 2020 06:01)  POCT Blood Glucose.: 80 mg/dL (19 May 2020 05:56)  POCT Blood Glucose.: 147 mg/dL (18 May 2020 23:03)  POCT Blood Glucose.: 161 mg/dL (18 May 2020 16:56)      Vital Signs Last 24 Hrs  T(C): 36.6 (19 May 2020 15:00), Max: 37.4 (18 May 2020 23:00)  T(F): 97.8 (19 May 2020 15:00), Max: 99.4 (18 May 2020 23:00)  HR: 100 (19 May 2020 15:00) (51 - 109)  BP: --  BP(mean): --  RR: 30 (19 May 2020 15:00) (15 - 38)  SpO2: 100% (19 May 2020 15:00) (95% - 100%)        PT/INR - ( 19 May 2020 00:55 )   PT: 13.6 sec;   INR: 1.18 ratio         PTT - ( 19 May 2020 00:55 )  PTT:80.5 sec        PHYSICAL EXAM:      HEENT: conjunctive   clear   Neck:  No JVD  Respiratory: decrease bs b/l   Cardiovascular: S1 and S2  Gastrointestinal: BS+, soft, NT/ND  Extremities: No peripheral edema  Skin: dry   Access: Not applicable

## 2020-05-19 NOTE — AIRWAY REMOVAL NOTE  ADULT & PEDS - ARTIFICAL AIRWAY REMOVAL COMMENTS
Written order for extubation verified. The patient was identified by full name and birth date compared to the identification band. Present during the procedure was MICU

## 2020-05-19 NOTE — PROGRESS NOTE ADULT - ATTENDING COMMENTS
Ran Tong  Attending Physician   Division of Infectious Disease  Pager #[phone]  After 5pm/weekend or no response, call #[phone]

## 2020-05-20 NOTE — PROGRESS NOTE ADULT - SUBJECTIVE AND OBJECTIVE BOX
Called overhead for Code Blue.  Upon arrival, ACLS being performed, patient being 2-hand mask ventilated with 100% FiO2 via ambu bag.  Due to droplet precautions, glidescope requested, but unavailable.  Attempt at direct laryngoscopy with Mac 4 made, grade 3 view obtained with pink secretions in oropharynx.  Unable to pass endotracheal tube.  Mask ventilation continued.  When glidescope arrived to room, decision made by code leader to discontinue ACLS.    Navin Hernandez, PGY-4

## 2020-05-20 NOTE — PROGRESS NOTE ADULT - PROBLEM SELECTOR PLAN 9
1.  Name of PCP: Dr. Pacheco  2.  PCP Contacted on Admission: [ ] Y    [ ] N    3.  PCP contacted at Discharge: [ ] Y    [ ] N    [ ] N/A  4.  Post-Discharge Appointment Date and Location:  5.  Summary of Handoff given to PCP: Med rec with wife

## 2020-05-20 NOTE — PROGRESS NOTE ADULT - PROBLEM SELECTOR PLAN 4
Hypernatremia to 154  -Continue to trend  -If continues to rise on 5/21, then will start D5  -Encourage PO Likely hemodynamically mediated MARJORIE. Baseline Cr 1.2-1.4 in 2017.  -F/u  urine lytes  -Given exam and pulm HTN, will encourage PO fluid

## 2020-05-20 NOTE — PROGRESS NOTE ADULT - CONSTITUTIONAL DETAILS
no distress/intubated
respiratory distress/lethargic
no distress/intubated, in ICU
cachectic
obese/no distress
on O2 mask/obese

## 2020-05-20 NOTE — PROGRESS NOTE ADULT - SUBJECTIVE AND OBJECTIVE BOX
RUSTY ARANDA 74y [id number]    Patient is a 74y old  Male who presents with a chief complaint of Weakness (20 May 2020 11:02)      Follow Up/CC:  ID following for leukocytosis     Interval History/ROS: no fever    Allergies    No Known Allergies    Intolerances        ANTIMICROBIALS:      MEDICATIONS  (STANDING):  carvedilol 6.25 milliGRAM(s) Oral every 12 hours  dextrose 5%. 1000 milliLiter(s) (50 mL/Hr) IV Continuous <Continuous>  dextrose 50% Injectable 12.5 Gram(s) IV Push once  dextrose 50% Injectable 25 Gram(s) IV Push once  dextrose 50% Injectable 25 Gram(s) IV Push once  epoetin-sandeep-epbx (RETACRIT) Injectable 86644 Unit(s) SubCutaneous <User Schedule>  heparin   Injectable 5000 Unit(s) SubCutaneous every 8 hours  insulin glargine Injectable (LANTUS) 15 Unit(s) SubCutaneous at bedtime  insulin lispro (HumaLOG) corrective regimen sliding scale   SubCutaneous three times a day before meals  insulin lispro (HumaLOG) corrective regimen sliding scale   SubCutaneous at bedtime  insulin lispro Injectable (HumaLOG) 7 Unit(s) SubCutaneous three times a day before meals  pantoprazole  Injectable 40 milliGRAM(s) IV Push daily  polyethylene glycol 3350 17 Gram(s) Oral daily    MEDICATIONS  (PRN):  acetaminophen    Suspension .. 650 milliGRAM(s) Oral every 6 hours PRN Mild Pain (1 - 3)  bisacodyl Suppository 10 milliGRAM(s) Rectal daily PRN Constipation  dextrose 40% Gel 15 Gram(s) Oral once PRN Blood Glucose LESS THAN 70 milliGRAM(s)/deciliter  glucagon  Injectable 1 milliGRAM(s) IntraMuscular once PRN Glucose LESS THAN 70 milligrams/deciliter  HYDROmorphone  Injectable 0.5 milliGRAM(s) IV Push every 3 hours PRN pain        Vital Signs Last 24 Hrs  T(C): 36.3 (20 May 2020 09:00), Max: 36.7 (19 May 2020 23:13)  T(F): 97.3 (20 May 2020 09:00), Max: 98 (19 May 2020 23:13)  HR: 82 (20 May 2020 09:54) (78 - 116)  BP: 155/69 (20 May 2020 09:54) (127/70 - 181/77)  BP(mean): 146 (19 May 2020 22:00) (98 - 146)  RR: 20 (20 May 2020 09:00) (20 - 34)  SpO2: 95% (20 May 2020 09:54) (92% - 100%)    CBC Full  -  ( 20 May 2020 07:07 )  WBC Count : 18.95 K/uL  RBC Count : 3.40 M/uL  Hemoglobin : 9.9 g/dL  Hematocrit : 33.7 %  Platelet Count - Automated : 330 K/uL  Mean Cell Volume : 99.1 fl  Mean Cell Hemoglobin : 29.1 pg  Mean Cell Hemoglobin Concentration : 29.4 gm/dL  Auto Neutrophil # : 16.00 K/uL  Auto Lymphocyte # : 1.29 K/uL  Auto Monocyte # : 0.78 K/uL  Auto Eosinophil # : 0.39 K/uL  Auto Basophil # : 0.10 K/uL  Auto Neutrophil % : 84.4 %  Auto Lymphocyte % : 6.8 %  Auto Monocyte % : 4.1 %  Auto Eosinophil % : 2.1 %  Auto Basophil % : 0.5 %    05-20    154<H>  |  116<H>  |  43<H>  ----------------------------<  74  3.7   |  24  |  1.50<H>    Ca    9.1      20 May 2020 07:07  Phos  4.2     05-20  Mg     2.4     05-20    TPro  6.7  /  Alb  2.1<L>  /  TBili  0.2  /  DBili  x   /  AST  18  /  ALT  11  /  AlkPhos  167<H>  05-20    LIVER FUNCTIONS - ( 20 May 2020 07:07 )  Alb: 2.1 g/dL / Pro: 6.7 g/dL / ALK PHOS: 167 U/L / ALT: 11 U/L / AST: 18 U/L / GGT: x           Procalcitonin, Serum: 6.82 (05-16)  Procalcitonin, Serum: 7.44 (05-14)  Procalcitonin, Serum: 0.80 (05-13)    C-Reactive Protein, Serum: 23.72 (05-13)    Ferritin, Serum: 321 (05-14)      D-Dimer Assay, Quantitative: 930 (05-13)        MICROBIOLOGY:  .Sputum Sputum  05-14-20   No growth  --    Numerous polymorphonuclear leukocytes per low power field  Few Squamous epithelial cells per low power field  No organisms seen per oil power field      .Blood Blood-Peripheral  05-13-20   No Growth Final  --  --      .Blood Blood-Peripheral  05-13-20   No Growth Final  --  --      .Blood Blood-Peripheral  05-08-20   No Growth Final  --  --      .Urine Clean Catch (Midstream)  05-08-20   No growth  --  --      COVID-19 PCR . (05.15.20 @ 15:54)    COVID-19 PCR: NotDetec: This test has been validated by [employer] to be accurate;  though it has not been FDA cleared/approved by the usual pathway.  As with all laboratory tests, results should be correlated with clinical  findings.  https://www.fda.gov/media/769499/download  https://www.fda.gov/media/658500/download          RADIOLOGY    < from: [facility] Pulmonary Perfusion Scan (05.18.20 @ 10:10) >  Very low probability of pulmonary embolus.    < end of copied text >

## 2020-05-20 NOTE — CONSULT NOTE ADULT - CONSULT REASON
BLE Weakness
Leukocytosis
Pt with sudden episode of Hypoxia yesterday then had functional decline that is slowly improving .
Respiratory distress
ckd and zehra
sacral wound

## 2020-05-20 NOTE — CHART NOTE - NSCHARTNOTEFT_GEN_A_CORE
Wound Care Team Note;    RN request for wound care consult for lip wound received. Chart reviewed. Case and recommendations discussed by Dr. Luz with clinical nurse. Per clinical nurse, lip wound is much improved, moisturizer being applied and doesn't require any further intervention. Will not follow. Please recall as necessary.    Danette Cazares, NP-C, CWOCN [id number]

## 2020-05-20 NOTE — PROGRESS NOTE ADULT - PROBLEM SELECTOR PLAN 5
Severe pulmonary hypertension on echo 5/12  Ddx: PE, valvular dysfunction, left sided dysfunction, SHYANN  -Will screen for SHYANN w/ wife  -Repeat TTE to assess if improved after AC and negative V/Q scan. Could be resolved now. Hypernatremia to 154  -Continue to trend  -If continues to rise on 5/21, then will start D5  -Encourage PO

## 2020-05-20 NOTE — PROGRESS NOTE ADULT - REASON FOR ADMISSION
Weakness

## 2020-05-20 NOTE — CHART NOTE - NSCHARTNOTEFT_GEN_A_CORE
74 M with a PMH of CAD s/p CABG, T2DM, HTN, HLD, and metastatic prostate cancer to bone on oral chemotherapy as outpt who originally presented for LE weakness (cord compression suspected but r/o'd with imaging) c/b AHRF likely 2/2 PNA s/p zosyn requiring intubation extubated on  now transfer to floors.    CODE BLUE CALLED FOR LACK OF SPONTANEOUS RESPIRATIONS.     Chest compressions began and patient placed on back board and monitor. Compressions and ambu bagging done at a ratio of 30:2. Patient given total of 4 epi, 2 bicarb and 1 calcium. Patient was PEA on monitor during 4 rhythm checks. Attempt at intubation x1, with marked hemoptysis and mucous. Patient  at 19:15. Primary team at bedside.

## 2020-05-20 NOTE — PROGRESS NOTE ADULT - PROBLEM SELECTOR PLAN 7
T2DM  -Lantus 15  -Lispro 7  -Low SSI Hx of CABG  -Will complete med rec and restart chronic home meds  -Coreg 6.25 mg BID

## 2020-05-20 NOTE — PROGRESS NOTE ADULT - PROBLEM SELECTOR PLAN 10
1.  Name of PCP: Dr. Pacheco  2.  PCP Contacted on Admission: [ ] Y    [ ] N    3.  PCP contacted at Discharge: [ ] Y    [ ] N    [ ] N/A  4.  Post-Discharge Appointment Date and Location:  5.  Summary of Handoff given to PCP: 1.  Name of PCP: Dr. Dawson  2.  PCP Contacted on Admission: [ ] Y    [ ] N    3.  PCP contacted at Discharge: [ ] Y    [ ] N    [ ] N/A  4.  Post-Discharge Appointment Date and Location:  5.  Summary of Handoff given to PCP:

## 2020-05-20 NOTE — PROGRESS NOTE ADULT - CARDIOVASCULAR DETAILS
positive S1/positive S2
positive S1/positive S2
positive S2/positive S1
positive S1/positive S2

## 2020-05-20 NOTE — PROGRESS NOTE ADULT - SUBJECTIVE AND OBJECTIVE BOX
Patient is a 74y Male whom presented to the hospital with ckd and zehra     PAST MEDICAL & SURGICAL HISTORY:  HTN (hypertension)  Prostate cancer  Hyperlipidemia  Neuropathy  Diabetes mellitus, type 2  CAD (coronary artery disease)  History of cholecystectomy  H/O coronary artery bypass surgery      MEDICATIONS  (STANDING):  calcium gluconate IVPB 1 Gram(s) IV Intermittent once  chlorhexidine 0.12% Liquid 15 milliLiter(s) Oral Mucosa every 12 hours  chlorhexidine 4% Liquid 1 Application(s) Topical <User Schedule>  chlorhexidine 4% Liquid 1 Application(s) Topical <User Schedule>  dexMEDEtomidine Infusion 0.1 MICROgram(s)/kG/Hr (2.67 mL/Hr) IV Continuous <Continuous>  dextrose 50% Injectable 50 milliLiter(s) IV Push once  heparin  Infusion.  Unit(s)/Hr (19 mL/Hr) IV Continuous <Continuous>  insulin lispro (HumaLOG) corrective regimen sliding scale   SubCutaneous every 4 hours  insulin regular  human recombinant. 10 Unit(s) IV Push once  lactated ringers. 1000 milliLiter(s) (100 mL/Hr) IV Continuous <Continuous>  norepinephrine Infusion 0.05 MICROgram(s)/kG/Min (9.99 mL/Hr) IV Continuous <Continuous>  pantoprazole  Injectable 40 milliGRAM(s) IV Push daily  piperacillin/tazobactam IVPB.. 3.375 Gram(s) IV Intermittent every 8 hours  vasopressin Infusion 0.03 Unit(s)/Min (1.8 mL/Hr) IV Continuous <Continuous>      Allergies    No Known Allergies    Intolerances        SOCIAL HISTORY:  Denies ETOh,Smoking,     FAMILY HISTORY:  No pertinent family history in first degree relatives      REVIEW OF SYSTEMS:    unable to obtained a good review system                                                                      9.9    18.95 )-----------( 330      ( 20 May 2020 07:07 )             33.7       CBC Full  -  ( 20 May 2020 07:07 )  WBC Count : 18.95 K/uL  RBC Count : 3.40 M/uL  Hemoglobin : 9.9 g/dL  Hematocrit : 33.7 %  Platelet Count - Automated : 330 K/uL  Mean Cell Volume : 99.1 fl  Mean Cell Hemoglobin : 29.1 pg  Mean Cell Hemoglobin Concentration : 29.4 gm/dL  Auto Neutrophil # : 16.00 K/uL  Auto Lymphocyte # : 1.29 K/uL  Auto Monocyte # : 0.78 K/uL  Auto Eosinophil # : 0.39 K/uL  Auto Basophil # : 0.10 K/uL  Auto Neutrophil % : 84.4 %  Auto Lymphocyte % : 6.8 %  Auto Monocyte % : 4.1 %  Auto Eosinophil % : 2.1 %  Auto Basophil % : 0.5 %      05-20    154<H>  |  116<H>  |  43<H>  ----------------------------<  74  3.7   |  24  |  1.50<H>    Ca    9.1      20 May 2020 07:07  Phos  4.2     05-20  Mg     2.4     05-20    TPro  6.7  /  Alb  2.1<L>  /  TBili  0.2  /  DBili  x   /  AST  18  /  ALT  11  /  AlkPhos  167<H>  05-20      CAPILLARY BLOOD GLUCOSE      POCT Blood Glucose.: 114 mg/dL (20 May 2020 17:11)  POCT Blood Glucose.: 106 mg/dL (20 May 2020 14:01)  POCT Blood Glucose.: 97 mg/dL (20 May 2020 08:42)  POCT Blood Glucose.: 118 mg/dL (19 May 2020 21:17)      Vital Signs Last 24 Hrs  T(C): 36.3 (20 May 2020 09:00), Max: 36.7 (19 May 2020 23:13)  T(F): 97.3 (20 May 2020 09:00), Max: 98 (19 May 2020 23:13)  HR: 82 (20 May 2020 09:54) (78 - 116)  BP: 155/69 (20 May 2020 09:54) (127/70 - 181/77)  BP(mean): 146 (19 May 2020 22:00) (110 - 146)  RR: 20 (20 May 2020 09:00) (20 - 34)  SpO2: 95% (20 May 2020 09:54) (92% - 97%)        PT/INR - ( 20 May 2020 07:55 )   PT: 12.2 sec;   INR: 1.07 ratio         PTT - ( 20 May 2020 07:55 )  PTT:23.2 sec    PHYSICAL EXAM:      HEENT: conjunctive   clear   Neck:  No JVD  Respiratory: decrease bs b/l   Cardiovascular: S1 and S2  Gastrointestinal: BS+, soft, NT/ND  Extremities: No peripheral edema  Skin: dry   Access: Not applicable

## 2020-05-20 NOTE — PROGRESS NOTE ADULT - ATTENDING COMMENTS
-Patient seen/examined on 5/20/20. Case/plan discussed with the intern and resident as reviewed/edited by me above and in any comments below.  -Heparin SQ for DVT PPx for now. -F/u repeat echo. -Pulmonary HTN may be due to underlying undiagnosed sleep apnea.   -Pulm and ID recs appreciated.   -F/u [facility] recs. LE exam with weakness (LLE>RLE). Babinski on LLE with apparently decreased reflexes. RLE with ?Babinski and increased reflexes. Concern for UMN b/l insults of LS spine with ?UMN/LMN insult on left side of LS spine. UE's strength intact. -Patient/family report he's only able to do them under anesthesia. -Patient may be a candidate for RT if there are spinal lesions.

## 2020-05-20 NOTE — CONSULT NOTE ADULT - ASSESSMENT
74-yo M w/ PMH of CAD s/p CABG, prostate CA on chemo, T2DM, HTN, and HLD, p/w bilateral lower extremity weakness, consulted for persistent leukocytosis despite antibiotics.
ASSESSMENT:  74 year old male with a PMH of CAD s/p CABG, T2DM, HTN, HLD, Prostate Cancer on chemo presenting with bilateral lower extremity weakness concerning for cord compression, found to have metastatic disease. Now with hypoxia requiring escalation of respiratory care, now intubated in ICU, requiring pressers.       PLAN:   Neurologic:   - Precedex     Respiratory:   - Intubated  - Mechanical ventilation  - Rising D-dimer, repeat   - Q6 ABGs    Cardiovascular:   - On brooke for blood pressure support  - a line for monitoring  - pending echo    Gastrointestinal/Nutrition:   - NPO  - PPI for prophylaxis     Renal/Genitourinary:   - NS @ 75    Hematologic:   - heparin gtt for presumed PE    Infectious Disease:   - zosyn for possible aspiration   - repeat COVID testing     Lines/Tubes:  - Arterial line left radial (5/13)  - Central line     Endocrine:   - ISS for elevated finger sticks    Disposition:   ICU    --------------------------------------------------------------------------------------    Critical Care Diagnoses:
Heriberto Hicks  74M PMHx CAD s/p CABG, prostate Ca presents to ED with BLE weakness. Normally ambulates with a walker / supports himself on furniture and railings, but now unable to. Exam: AAOx3, FC, UE 5/5, BLE diffusely 2/5. SILT. Decreased rectal tone. Retaining, now has mccann.  - CT T and L show what looks to be diffuse bony mets without obvious compression Fx, f/u final read  - Extensive conversation with wife who notes extended (>1yr) progressive weakness, yesterday was the first day he was unable to get out of the chair by himself  - MRI T/L without con to further evaluate for cord compression
Heriberto Hicks  74M PMHx CAD s/p CABG, prostate Ca, BLE weakness, episodic  hypoxics with tachycardia and obtunded slowly improving     - CT T and L show what looks to be diffuse bony mets without obvious compression Fx,   serial CXR reviewed from 2017 to present with no marked changes   elevated D dimer   Wells PE sore of 10   PE is most likely cause of Hypoxic event
Mr. Hicks is a 74 year old male with a PMH of CAD s/p CABG, T2DM, HTN, HLD, Prostate Cancer on chemo presenting with bilateral lower extremity weakness. Patient reports that a couple days had an episode where he got up from the chair and due to weakness in his thighs fell to the ground forward. Denies LOC or head trauma. No dizziness, LH, chest pain, shortness of breath or palpitations before. Was on the ground for about an hour and had son-in-law help him up as he felt too weak. Denies back pain or numbness/tingling in his legs. Has been having progressive weakness for the past few weeks. Seems like Neurosurgery spoke to wife and has had progressive weakness for more than a year. Has had no fevers/chills, cough, abdominal pain, nausea, vomiting or diarrhea. Has not been urinating the past few days. No bowel incontinence. Denies saddle anesthesia. [facility], and takes oral chemotherapy.     In the ED:  Vitals: 98.6F /69 HR 75 RR 16   Labs: WBC 18.89 Hgb 10.6 BUN 51 SCr 2.19   COVID x 1 neg  Consults: Neurosurgery  Interventions: Vanc 1g x 1, Zosyn x 1, NS Bolus 500cc x 2, KCl 40 mEq x 1 (08 May 2020 21:48)
Impression:    Sacral wound    Recommend:  1.) topical therapy: sacral wound - cleanse with NS, pat dry, apply allevyn foam dressing daily  2.) Maintain on alternating air with low air loss  3.) turn and reposition Q 2 hours  4.) Nutrition optimization  5.) Incontinence management - incontinence pads, cleanser, pericare BID  6.) Glycemic control    Care as per medicine. Will remain available. Contact information.  Upon discharge f/[facility] outpatient at [facility] [address] [place] [address] [phone]  Thank you for this consult  Judy Pack PA-C [id number]

## 2020-05-20 NOTE — PROGRESS NOTE ADULT - SUBJECTIVE AND OBJECTIVE BOX
Pulmonary Follow Up       awake, says his breathing doesnt feel great but appears comfortable.    ROS: no pain    MEDICATIONS  (STANDING):  carvedilol 6.25 milliGRAM(s) Oral every 12 hours  dextrose 5%. 1000 milliLiter(s) (50 mL/Hr) IV Continuous <Continuous>  dextrose 50% Injectable 12.5 Gram(s) IV Push once  dextrose 50% Injectable 25 Gram(s) IV Push once  dextrose 50% Injectable 25 Gram(s) IV Push once  epoetin-sandeep-epbx (RETACRIT) Injectable 39424 Unit(s) SubCutaneous <User Schedule>  heparin   Injectable 5000 Unit(s) SubCutaneous every 8 hours  insulin glargine Injectable (LANTUS) 15 Unit(s) SubCutaneous at bedtime  insulin lispro (HumaLOG) corrective regimen sliding scale   SubCutaneous three times a day before meals  insulin lispro (HumaLOG) corrective regimen sliding scale   SubCutaneous at bedtime  insulin lispro Injectable (HumaLOG) 7 Unit(s) SubCutaneous three times a day before meals  pantoprazole  Injectable 40 milliGRAM(s) IV Push daily  polyethylene glycol 3350 17 Gram(s) Oral daily    MEDICATIONS  (PRN):  acetaminophen    Suspension .. 650 milliGRAM(s) Oral every 6 hours PRN Mild Pain (1 - 3)  bisacodyl Suppository 10 milliGRAM(s) Rectal daily PRN Constipation  dextrose 40% Gel 15 Gram(s) Oral once PRN Blood Glucose LESS THAN 70 milliGRAM(s)/deciliter  glucagon  Injectable 1 milliGRAM(s) IntraMuscular once PRN Glucose LESS THAN 70 milligrams/deciliter  HYDROmorphone  Injectable 0.5 milliGRAM(s) IV Push every 3 hours PRN pain      Vital Signs Last 24 Hrs  T(C): 36.3 (20 May 2020 09:00), Max: 36.7 (19 May 2020 23:13)  T(F): 97.3 (20 May 2020 09:00), Max: 98 (19 May 2020 23:13)  HR: 82 (20 May 2020 09:54) (78 - 116)  BP: 155/69 (20 May 2020 09:54) (127/70 - 181/77)  BP(mean): 146 (19 May 2020 22:00) (98 - 146)  RR: 20 (20 May 2020 09:00) (20 - 34)  SpO2: 95% (20 May 2020 09:54) (92% - 100%)  GEN: NAD  Lungs: clear    LABS:                                   9.9    18.95 )-----------( 330      ( 20 May 2020 07:07 )             33.7   05-20    154<H>  |  116<H>  |  43<H>  ----------------------------<  74  3.7   |  24  |  1.50<H>    Ca    9.1      20 May 2020 07:07  Phos  4.2     05-20  Mg     2.4     05-20    TPro  6.7  /  Alb  2.1<L>  /  TBili  0.2  /  DBili  x   /  AST  18  /  ALT  11  /  AlkPhos  167<H>  05-20        ABG - ( 19 May 2020 06:39 )  pH, Arterial: 7.40  pH, Blood: x     /  pCO2: 41    /  pO2: 122   / HCO3: 25    / Base Excess: .7    /  SaO2: 99                IMAGING:    < end of copied text >  < from: CT Head No Cont (05.11.20 @ 08:37) >  EXAM:  CT BRAIN                            PROCEDURE DATE:  05/11/2020        INTERPRETATION:  History: Confusion. Agitation. Lower extremity weakness. Altered mental status.        Comparison is made to head CT study from 05/16/2016.    There is no evidence for acute infarct, acute hemorrhage, mass effect, calvarial fracture, or hydrocephalus. A chronic left thalamic lacunar infarct is unchanged.    Moderate patchy hypodensity without mass effect is noted in the periventricular white matter which most likely represents chronic microvascular ischemic changes given the patient's age.    Cerebral volume loss is present with secondary proportional prominence of the sulci and ventricles.    A chronic left medial orbital wall fracture is unchanged. The paranasal sinuses demonstrate mild mucosal thickening.    The mastoid air cells and middle ear cavities are clear.    No lytic or blastic destructive calvarial lesions are noted.    IMPRESSION:    No acute intracranial pathology is noted. If the patient has new and persistent symptoms, consider short interval follow-up head CT or brain MRI follow-up if there are no MRI contraindications.      < from: NM Pulmonary Perfusion Scan (05.18.20 @ 10:10) >    IMPRESSION:     Very low probability of pulmonary embolus.    < end of copied text >      < from: Xray Chest 1 View- PORTABLE-Urgent (05.18.20 @ 08:35) >  IMPRESSION:    1.  The lungs are clear.    < end of copied text >            A/P:  Probable SHYANN  HTN (hypertension)  Prostate cancer metastatic to bone (spine) undergoing chemo   Hyperlipidemia  Neuropathy  Diabetes mellitus, type 2  CAD (coronary artery disease)  CKD/MARJORIE              REC   - s/p abx for possible pna, PE r/o with VQ  -would treat empirically for SHYANN with CPAP 10 QHS  -check ABG if change in mental status, monitor O2 sat      Izabel Willingham MD  [phone]

## 2020-05-20 NOTE — PROGRESS NOTE ADULT - PROBLEM SELECTOR PLAN 8
Med rec with wife T2DM  -Lantus 15  -Lispro 7  -Low SSI T2DM  -Lantus 15 QHS  -Lispro 7 with meals  -Low SSI

## 2020-05-20 NOTE — PROGRESS NOTE ADULT - NEUROLOGICAL DETAILS
moving arms
no spontaneous movement/disoriented
no spontaneous movement
moving extremities
responds to verbal commands
responds to verbal commands

## 2020-05-20 NOTE — PROGRESS NOTE ADULT - PROBLEM SELECTOR PLAN 6
Hx of CABG  -Will complete med rec and restart chronic home meds  -Coreg 6.25 mg BID Severe pulmonary hypertension on echo 5/12  Ddx: PE, valvular dysfunction, left sided dysfunction, SHYANN  -Will screen for SHYANN w/ wife  -Repeat TTE to assess if improved after AC and negative V/Q scan. Could be resolved now.

## 2020-05-20 NOTE — PROGRESS NOTE ADULT - ASSESSMENT
74 M with a PMH of CAD s/p CABG, T2DM, HTN, HLD, and metastatic prostate cancer to bone on oral chemotherapy as outpt who originally presented for LE weakness c/b AHRF likely 2/2 PNA s/p zosyn requiring intubation extubated on 5/19 now transfer to floors.  - Patient with persistent weakness in BLE, partial MRI on admission without obvious compressive lesion, was unable to receive lumbar imaging  - Patient claustrophobic would require anesthesia for full MRI, discussed risks / benefits of neuroaxis imaging with primary team, results would potentially alter treatment if metastasis found (e.g radiation vs possible decompression) although given chronicity of symptoms not emergent would do prior to discharge.  - If patient elects for anesthesia and MRI would do MRI neuroaxis wwo for cancer staging as well

## 2020-05-20 NOTE — PROGRESS NOTE ADULT - MS EXT PE MLT D E PC
pedal edema/no cyanosis
no cyanosis
no cyanosis/no pedal edema
no cyanosis
no cyanosis/pedal edema
pedal edema/no cyanosis

## 2020-05-20 NOTE — PROGRESS NOTE ADULT - PROBLEM SELECTOR PLAN 3
Likely hemodynamically mediated MARJORIE. Baseline Cr 1.2-1.4 in 2017.  -F/u  urine lytes  -Given exam and pulm HTN, will encourage PO fluid Persistent leukocytosis likely 2/2 cancer, afebrile  -Monitor off abx Persistent leukocytosis likely 2/2 cancer, afebrile  -Monitor off abx  -ID recs appreciated.

## 2020-05-20 NOTE — PROGRESS NOTE ADULT - RS GEN PE MLT RESP DETAILS PC
clear to auscultation bilaterally/no wheezes/airway patent
transmitted sound from BIPAP/no wheezes
airway patent/clear to auscultation bilaterally/no wheezes
clear to auscultation bilaterally/no wheezes
diminished breath sounds, R/no wheezes
no wheezes/clear to auscultation bilaterally

## 2020-05-20 NOTE — CONSULT NOTE ADULT - ATTENDING COMMENTS
73 yo male with metastatic prostate CA, reported with sacral wound  Is not ambulatory  T/C with RN  Advise continuation of use of Allevyn foam

## 2020-05-20 NOTE — DISCHARGE NOTE FOR THE EXPIRED PATIENT - HOSPITAL COURSE
75 yo M w/ hx of CAD s/p CABG, T2DM, HTN, HLD, metastatic Prostate Cancer on chemo admitted 5/8 for bilateral lower extremity weakness r/o for cord compression, found to have metastatic disease of sacral and illiac crest who presented with 3 days of lower extremity weakness. Cord compression was ruled out and CTH was unremarkable. However, w/u limited by claustraphobia making lumbar MRI not feasible. Then, course was complicated by AHRF 2/2 either PNA (started on zosyn), PE (could not confirm further b/c of MARJORIE and COVID limiting V/Q availability), and fluid overload (received gentle diuresis). He was escalated to BiPAP but his hypoxia persisted.    RRT 5/13 s/p intubation/central line/fidencio and requiring course of pressors while on sedation now on levo, hep gtt, precedex, pantoprazole IVP, dilaudid PRN for pain was titrating free water for hyper NA, CPAP welled, extubated 5/19 w/o complication, secretion decreased, satting well on RA passed dysphagia screen, resumed diet, started fluconazole for oral thrush, d/roverto central, a line removed.    On the floors, he was scheduled for full CNS MRI under sedation and was found to be euvolemic. He was breathing well on RA with all VSS. V/Q had shown very low probability for PE. Repeat TTE redemonstrated pulmonary hypertension although improved from prior. Code blue on 5/20 when patient found pulseless in PEA. Despite 4 epi, 2 bicarb, 1 calcium, and CPR, patient remained pulseless.

## 2020-05-20 NOTE — PROGRESS NOTE ADULT - ATTENDING COMMENTS
Ran Tong  Attending Physician   Division of Infectious Disease  Pager #[phone]  After 5pm/weekend or no response, call #[phone]

## 2020-05-20 NOTE — PROGRESS NOTE ADULT - SUBJECTIVE AND OBJECTIVE BOX
Attempted to see patient at bedside, away at ECHO    --Anticoagulation--  heparin   Injectable 5000 Unit(s) SubCutaneous every 8 hours    T(C): 36.3 (05-20-20 @ 09:00), Max: 36.7 (05-19-20 @ 23:13)  HR: 82 (05-20-20 @ 09:54) (78 - 116)  BP: 155/69 (05-20-20 @ 09:54) (127/70 - 181/77)  RR: 20 (05-20-20 @ 09:00) (20 - 34)  SpO2: 95% (05-20-20 @ 09:54) (92% - 100%)  Wt(kg): --    Exam:  Deferred

## 2020-05-20 NOTE — PROGRESS NOTE ADULT - PROBLEM SELECTOR PLAN 2
Persistent leukocytosis, afebrile  -Monitor off abx Persistent leukocytosis likely 2/2 cancer, afebrile  -Monitor off abx AHRF now resolved but was likely due to PNA in the setting of pulmonary hypertension v PE (S/p 5/12-5/19, very low prob PE on VQ 5/18)  -For PNA, leukocytosis but w/o sputum or blood cultures positive s/p full course of zosyn 5/8-5/16  -For PE, hx of cancer and negative VQ but only after days of full AC and when hypoxia improved. Will continue with sq heparin as no way of indicating diagnosis at this time. If worsens, will consider repeat V/Q AHRF now resolved but was likely due to PNA in the setting of pulmonary hypertension v PE (S/p 5/12-5/19, very low prob PE on VQ 5/18)  -For PNA, leukocytosis but w/o sputum or blood cultures positive s/p full course of zosyn 5/8-5/16  -For PE, hx of cancer and negative VQ but only after days of full AC and when hypoxia improved. Will continue with sq heparin as no way of indicating diagnosis at this time. If worsens, will consider repeat V/Q vs CTA chest.

## 2020-05-20 NOTE — PROGRESS NOTE ADULT - ASSESSMENT
74 M with a PMH of CAD s/p CABG, T2DM, HTN, HLD, and metastatic prostate cancer to bone on oral chemotherapy as outpt who originally presented for LE weakness (cord compression suspected but r/o'd with imaging) c/b AHRF likely 2/2 PNA s/p zosyn requiring intubation extubated on 5/19 now transfer to floors.

## 2020-05-20 NOTE — CHART NOTE - NSCHARTNOTEFT_GEN_A_CORE
Called and updated Yarelis Hicks, daughter, and grandson thoroughly. Time spent ~30 minutes. Gave reassurance that MRI would be under sedation.    Jacinto Hutson, PGY1

## 2020-05-20 NOTE — PROGRESS NOTE ADULT - SUBJECTIVE AND OBJECTIVE BOX
PROGRESS NOTE:     Patient is a 74y old  Male who presents with a chief complaint of Weakness (19 May 2020 15:19)      SUBJECTIVE / OVERNIGHT EVENTS:  Downgraded from MICU overnight. No interval events.  NAC    ADDITIONAL REVIEW OF SYSTEMS:  No fevers, chest pain, shortness of breath, abdominal pain, lower extremity swelling or pain, dysuria, or constipation.    MEDICATIONS  (STANDING):  carvedilol 6.25 milliGRAM(s) Oral every 12 hours  dextrose 5%. 1000 milliLiter(s) (50 mL/Hr) IV Continuous <Continuous>  dextrose 50% Injectable 12.5 Gram(s) IV Push once  dextrose 50% Injectable 25 Gram(s) IV Push once  dextrose 50% Injectable 25 Gram(s) IV Push once  epoetin-sandeep-epbx (RETACRIT) Injectable 31724 Unit(s) SubCutaneous <User Schedule>  heparin   Injectable 5000 Unit(s) SubCutaneous every 8 hours  insulin glargine Injectable (LANTUS) 15 Unit(s) SubCutaneous at bedtime  insulin lispro (HumaLOG) corrective regimen sliding scale   SubCutaneous three times a day before meals  insulin lispro (HumaLOG) corrective regimen sliding scale   SubCutaneous at bedtime  insulin lispro Injectable (HumaLOG) 7 Unit(s) SubCutaneous three times a day before meals  pantoprazole  Injectable 40 milliGRAM(s) IV Push daily  polyethylene glycol 3350 17 Gram(s) Oral daily    MEDICATIONS  (PRN):  acetaminophen    Suspension .. 650 milliGRAM(s) Oral every 6 hours PRN Mild Pain (1 - 3)  bisacodyl Suppository 10 milliGRAM(s) Rectal daily PRN Constipation  dextrose 40% Gel 15 Gram(s) Oral once PRN Blood Glucose LESS THAN 70 milliGRAM(s)/deciliter  glucagon  Injectable 1 milliGRAM(s) IntraMuscular once PRN Glucose LESS THAN 70 milligrams/deciliter  HYDROmorphone  Injectable 0.5 milliGRAM(s) IV Push every 3 hours PRN pain      CAPILLARY BLOOD GLUCOSE      POCT Blood Glucose.: 97 mg/dL (20 May 2020 08:42)  POCT Blood Glucose.: 118 mg/dL (19 May 2020 21:17)  POCT Blood Glucose.: 117 mg/dL (19 May 2020 18:06)  POCT Blood Glucose.: 121 mg/dL (19 May 2020 14:16)  POCT Blood Glucose.: 144 mg/dL (19 May 2020 10:06)    I&O's Summary    19 May 2020 07:01  -  20 May 2020 07:00  --------------------------------------------------------  IN: 210.6 mL / OUT: 2575 mL / NET: -2364.4 mL        PHYSICAL EXAM:  Vital Signs Last 24 Hrs  T(C): 36.7 (20 May 2020 05:09), Max: 36.7 (19 May 2020 11:00)  T(F): 98 (20 May 2020 05:09), Max: 98.1 (19 May 2020 11:00)  HR: 95 (20 May 2020 05:09) (75 - 116)  BP: 152/74 (20 May 2020 05:09) (144/68 - 181/77)  BP(mean): 146 (19 May 2020 22:00) (98 - 146)  RR: 20 (20 May 2020 05:09) (20 - 34)  SpO2: 94% (20 May 2020 05:09) (93% - 100%)    CONSTITUTIONAL: NAD, well-developed  CARDIOVASCULAR: Regular rate and rhythm. Normal S1 and S2. No murmurs. JVP at 8 CM with positive hepatojugular reflex.  RESPIRATORY: Normal respiratory effort, Lungs CTAB. No basilar crackles at midaxillary line bilaterally  EXTREMITIES: Trace RAMÓN 1+  ABDOMEN: Nontender to palpation, normoactive bowel sounds, no rebound/guarding; No hepatosplenomegaly  MUSCLOSKELETAL: no clubbing or cyanosis of digits; no joint swelling or tenderness to palpation  PSYCH: A+O to person, place, and time; affect appropriate    LABS:                        9.9    18.95 )-----------( 330      ( 20 May 2020 07:07 )             33.7     05-20    154<H>  |  116<H>  |  43<H>  ----------------------------<  74  3.7   |  24  |  1.50<H>    Ca    9.1      20 May 2020 07:07  Phos  4.2     05-20  Mg     2.4     05-20    TPro  6.7  /  Alb  2.1<L>  /  TBili  0.2  /  DBili  x   /  AST  18  /  ALT  11  /  AlkPhos  167<H>  05-20    PT/INR - ( 20 May 2020 07:55 )   PT: 12.2 sec;   INR: 1.07 ratio         PTT - ( 20 May 2020 07:55 )  PTT:23.2 sec            RADIOLOGY & ADDITIONAL TESTS:  Results Reviewed: Leukocytosis uptrending. Hypernatremia worsening.  Imaging Personally Reviewed:  Electrocardiogram Personally Reviewed:    COORDINATION OF CARE:  Care Discussed with Consultants/Other Providers [Y/N]:  Prior or Outpatient Records Reviewed [Y/N]: PROGRESS NOTE:     Patient is a 74y old  Male who presents with a chief complaint of Weakness (19 May 2020 15:19)      SUBJECTIVE / OVERNIGHT EVENTS:  Downgraded from MICU overnight. No interval events.  NAC    ADDITIONAL REVIEW OF SYSTEMS:  No fevers, chest pain, shortness of breath, abdominal pain, lower extremity swelling or pain, dysuria, or constipation.    MEDICATIONS  (STANDING):  carvedilol 6.25 milliGRAM(s) Oral every 12 hours  dextrose 5%. 1000 milliLiter(s) (50 mL/Hr) IV Continuous <Continuous>  dextrose 50% Injectable 12.5 Gram(s) IV Push once  dextrose 50% Injectable 25 Gram(s) IV Push once  dextrose 50% Injectable 25 Gram(s) IV Push once  epoetin-sandeep-epbx (RETACRIT) Injectable 12696 Unit(s) SubCutaneous <User Schedule>  heparin   Injectable 5000 Unit(s) SubCutaneous every 8 hours  insulin glargine Injectable (LANTUS) 15 Unit(s) SubCutaneous at bedtime  insulin lispro (HumaLOG) corrective regimen sliding scale   SubCutaneous three times a day before meals  insulin lispro (HumaLOG) corrective regimen sliding scale   SubCutaneous at bedtime  insulin lispro Injectable (HumaLOG) 7 Unit(s) SubCutaneous three times a day before meals  pantoprazole  Injectable 40 milliGRAM(s) IV Push daily  polyethylene glycol 3350 17 Gram(s) Oral daily    MEDICATIONS  (PRN):  acetaminophen    Suspension .. 650 milliGRAM(s) Oral every 6 hours PRN Mild Pain (1 - 3)  bisacodyl Suppository 10 milliGRAM(s) Rectal daily PRN Constipation  dextrose 40% Gel 15 Gram(s) Oral once PRN Blood Glucose LESS THAN 70 milliGRAM(s)/deciliter  glucagon  Injectable 1 milliGRAM(s) IntraMuscular once PRN Glucose LESS THAN 70 milligrams/deciliter  HYDROmorphone  Injectable 0.5 milliGRAM(s) IV Push every 3 hours PRN pain      CAPILLARY BLOOD GLUCOSE      POCT Blood Glucose.: 97 mg/dL (20 May 2020 08:42)  POCT Blood Glucose.: 118 mg/dL (19 May 2020 21:17)  POCT Blood Glucose.: 117 mg/dL (19 May 2020 18:06)  POCT Blood Glucose.: 121 mg/dL (19 May 2020 14:16)  POCT Blood Glucose.: 144 mg/dL (19 May 2020 10:06)    I&O's Summary    19 May 2020 07:01  -  20 May 2020 07:00  --------------------------------------------------------  IN: 210.6 mL / OUT: 2575 mL / NET: -2364.4 mL        PHYSICAL EXAM:  Vital Signs Last 24 Hrs  T(C): 36.7 (20 May 2020 05:09), Max: 36.7 (19 May 2020 11:00)  T(F): 98 (20 May 2020 05:09), Max: 98.1 (19 May 2020 11:00)  HR: 95 (20 May 2020 05:09) (75 - 116)  BP: 152/74 (20 May 2020 05:09) (144/68 - 181/77)  BP(mean): 146 (19 May 2020 22:00) (98 - 146)  RR: 20 (20 May 2020 05:09) (20 - 34)  SpO2: 94% (20 May 2020 05:09) (93% - 100%)    CONSTITUTIONAL: NAD, well-developed  CARDIOVASCULAR: Regular rate and rhythm. Normal S1 and S2. No murmurs. JVP at 8 CM with positive hepatojugular reflex.  RESPIRATORY: Normal respiratory effort, Lungs CTAB. No basilar crackles at midaxillary line bilaterally  EXTREMITIES: Trace RAMÓN 1+  ABDOMEN: Nontender to palpation, normoactive bowel sounds, no rebound/guarding; No hepatosplenomegaly  NEURO: Hip flexion is 1/5 bilaterally. 5/5 dorsiflexion and plantarflexion on the right and 4/5 dorsiflexion and plantarflexion on the left. Sensation intact to light touch on dorsum of foot, inner and outer lower extremity. ?Upgoing babinski on left.  PSYCH: A+O to person, place, and time; affect appropriate    LABS:                        9.9    18.95 )-----------( 330      ( 20 May 2020 07:07 )             33.7     05-20    154<H>  |  116<H>  |  43<H>  ----------------------------<  74  3.7   |  24  |  1.50<H>    Ca    9.1      20 May 2020 07:07  Phos  4.2     05-20  Mg     2.4     05-20    TPro  6.7  /  Alb  2.1<L>  /  TBili  0.2  /  DBili  x   /  AST  18  /  ALT  11  /  AlkPhos  167<H>  05-20    PT/INR - ( 20 May 2020 07:55 )   PT: 12.2 sec;   INR: 1.07 ratio         PTT - ( 20 May 2020 07:55 )  PTT:23.2 sec            RADIOLOGY & ADDITIONAL TESTS:  Results Reviewed: Leukocytosis uptrending. Hypernatremia worsening.  Imaging Personally Reviewed:  Electrocardiogram Personally Reviewed:    COORDINATION OF CARE:  Care Discussed with Consultants/Other Providers [Y/N]:  Prior or Outpatient Records Reviewed [Y/N]:

## 2020-05-20 NOTE — CONSULT NOTE ADULT - SUBJECTIVE AND OBJECTIVE BOX
Wound Surgery Consult Note:    HPI:  Mr. Hicks is a 74 year old male with a PMH of CAD s/p CABG, T2DM, HTN, HLD, Prostate Cancer on chemo presenting with bilateral lower extremity weakness. Patient reports that a couple days had an episode where he got up from the chair and due to weakness in his thighs fell to the ground forward. Denies LOC or head trauma. No dizziness, LH, chest pain, shortness of breath or palpitations before. Was on the ground for about an hour and had son-in-law help him up as he felt too weak. Denies back pain or numbness/tingling in his legs. Has been having progressive weakness for the past few weeks. Seems like Neurosurgery spoke to wife and has had progressive weakness for more than a year. Has had no fevers/chills, cough, abdominal pain, nausea, vomiting or diarrhea. Has not been urinating the past few days. No bowel incontinence. Denies saddle anesthesia. [facility], and takes oral chemotherapy.     RN request for wound care consult for sacral wound received.     PAST MEDICAL & SURGICAL HISTORY:  HTN (hypertension)  Prostate cancer  Hyperlipidemia  Neuropathy  Diabetes mellitus, type 2  CAD (coronary artery disease)  History of cholecystectomy  H/O coronary artery bypass surgery    REVIEW OF SYSTEMS  unable to obtain due to patient condition    MEDICATIONS  (STANDING):  carvedilol 6.25 milliGRAM(s) Oral every 12 hours  dextrose 5%. 1000 milliLiter(s) (50 mL/Hr) IV Continuous <Continuous>  dextrose 50% Injectable 12.5 Gram(s) IV Push once  dextrose 50% Injectable 25 Gram(s) IV Push once  dextrose 50% Injectable 25 Gram(s) IV Push once  epoetin-sandeep-epbx (RETACRIT) Injectable 82079 Unit(s) SubCutaneous <User Schedule>  heparin   Injectable 5000 Unit(s) SubCutaneous every 8 hours  insulin glargine Injectable (LANTUS) 15 Unit(s) SubCutaneous at bedtime  insulin lispro (HumaLOG) corrective regimen sliding scale   SubCutaneous three times a day before meals  insulin lispro (HumaLOG) corrective regimen sliding scale   SubCutaneous at bedtime  insulin lispro Injectable (HumaLOG) 7 Unit(s) SubCutaneous three times a day before meals  pantoprazole  Injectable 40 milliGRAM(s) IV Push daily  polyethylene glycol 3350 17 Gram(s) Oral daily    MEDICATIONS  (PRN):  acetaminophen    Suspension .. 650 milliGRAM(s) Oral every 6 hours PRN Mild Pain (1 - 3)  bisacodyl Suppository 10 milliGRAM(s) Rectal daily PRN Constipation  dextrose 40% Gel 15 Gram(s) Oral once PRN Blood Glucose LESS THAN 70 milliGRAM(s)/deciliter  glucagon  Injectable 1 milliGRAM(s) IntraMuscular once PRN Glucose LESS THAN 70 milligrams/deciliter  HYDROmorphone  Injectable 0.5 milliGRAM(s) IV Push every 3 hours PRN pain    Allergies    No Known Allergies    Intolerances    SOCIAL HISTORY:  Unable to obtain due to patient's condition    FAMILY HISTORY:  No pertinent family history in first degree relatives    Vital Signs Last 24 Hrs  T(C): 36.3 (20 May 2020 09:00), Max: 36.7 (19 May 2020 23:13)  T(F): 97.3 (20 May 2020 09:00), Max: 98 (19 May 2020 23:13)  HR: 82 (20 May 2020 09:54) (78 - 116)  BP: 155/69 (20 May 2020 09:54) (127/70 - 181/77)  BP(mean): 146 (19 May 2020 22:00) (98 - 146)  RR: 20 (20 May 2020 09:00) (20 - 34)  SpO2: 95% (20 May 2020 09:54) (92% - 100%)    Physical Exam:  Deferred as explained above.    LABS:  05-20    154<H>  |  116<H>  |  43<H>  ----------------------------<  74  3.7   |  24  |  1.50<H>    Ca    9.1      20 May 2020 07:07  Phos  4.2     05-20  Mg     2.4     05-20    TPro  6.7  /  Alb  2.1<L>  /  TBili  0.2  /  DBili  x   /  AST  18  /  ALT  11  /  AlkPhos  167<H>  05-20                          9.9    18.95 )-----------( 330      ( 20 May 2020 07:07 )             33.7     PT/INR - ( 20 May 2020 07:55 )   PT: 12.2 sec;   INR: 1.07 ratio         PTT - ( 20 May 2020 07:55 )  PTT:23.2 sec Wound Surgery Consult Note:    HPI:  Mr. Hicks is a 74 year old male with a PMH of CAD s/p CABG, T2DM, HTN, HLD, Prostate Cancer on chemo presenting with bilateral lower extremity weakness. Patient reports that a couple days had an episode where he got up from the chair and due to weakness in his thighs fell to the ground forward. Denies LOC or head trauma. No dizziness, LH, chest pain, shortness of breath or palpitations before. Was on the ground for about an hour and had son-in-law help him up as he felt too weak. Denies back pain or numbness/tingling in his legs. Has been having progressive weakness for the past few weeks. Seems like Neurosurgery spoke to wife and has had progressive weakness for more than a year. Has had no fevers/chills, cough, abdominal pain, nausea, vomiting or diarrhea. Has not been urinating the past few days. No bowel incontinence. Denies saddle anesthesia. His [facility], and takes oral chemotherapy.     RN request for wound care consult for sacral wound received. Due to [facility] policy during the evolving novel coronavirus outbreak, efforts are being made to limit unnecessary patient contacts to limit the spread of disease. Accordingly, patients with wounds without clear indication for physical exam or face to face interview from the Wound Care team will be adjusted in conjunction with conversations with primary provider team. This is being done for the safety of all the patients for whom we care. Therefore, recommendations for this patient who is currently being quarantined due to COVID-19 risk factors were discussed by Dr. Luz with his clinical nurse after reviewing his chart.     PAST MEDICAL & SURGICAL HISTORY:  HTN (hypertension)  Prostate cancer  Hyperlipidemia  Neuropathy  Diabetes mellitus, type 2  CAD (coronary artery disease)  History of cholecystectomy  H/O coronary artery bypass surgery    REVIEW OF SYSTEMS  unable to obtain due to patient condition    MEDICATIONS  (STANDING):  carvedilol 6.25 milliGRAM(s) Oral every 12 hours  dextrose 5%. 1000 milliLiter(s) (50 mL/Hr) IV Continuous <Continuous>  dextrose 50% Injectable 12.5 Gram(s) IV Push once  dextrose 50% Injectable 25 Gram(s) IV Push once  dextrose 50% Injectable 25 Gram(s) IV Push once  epoetin-sandeep-epbx (RETACRIT) Injectable 76968 Unit(s) SubCutaneous <User Schedule>  heparin   Injectable 5000 Unit(s) SubCutaneous every 8 hours  insulin glargine Injectable (LANTUS) 15 Unit(s) SubCutaneous at bedtime  insulin lispro (HumaLOG) corrective regimen sliding scale   SubCutaneous three times a day before meals  insulin lispro (HumaLOG) corrective regimen sliding scale   SubCutaneous at bedtime  insulin lispro Injectable (HumaLOG) 7 Unit(s) SubCutaneous three times a day before meals  pantoprazole  Injectable 40 milliGRAM(s) IV Push daily  polyethylene glycol 3350 17 Gram(s) Oral daily    MEDICATIONS  (PRN):  acetaminophen    Suspension .. 650 milliGRAM(s) Oral every 6 hours PRN Mild Pain (1 - 3)  bisacodyl Suppository 10 milliGRAM(s) Rectal daily PRN Constipation  dextrose 40% Gel 15 Gram(s) Oral once PRN Blood Glucose LESS THAN 70 milliGRAM(s)/deciliter  glucagon  Injectable 1 milliGRAM(s) IntraMuscular once PRN Glucose LESS THAN 70 milligrams/deciliter  HYDROmorphone  Injectable 0.5 milliGRAM(s) IV Push every 3 hours PRN pain    Allergies    No Known Allergies    Intolerances    SOCIAL HISTORY:  Unable to obtain due to patient's condition    FAMILY HISTORY:  No pertinent family history in first degree relatives    Vital Signs Last 24 Hrs  T(C): 36.3 (20 May 2020 09:00), Max: 36.7 (19 May 2020 23:13)  T(F): 97.3 (20 May 2020 09:00), Max: 98 (19 May 2020 23:13)  HR: 82 (20 May 2020 09:54) (78 - 116)  BP: 155/69 (20 May 2020 09:54) (127/70 - 181/77)  BP(mean): 146 (19 May 2020 22:00) (98 - 146)  RR: 20 (20 May 2020 09:00) (20 - 34)  SpO2: 95% (20 May 2020 09:54) (92% - 100%)    Physical Exam:  Deferred as explained above.    LABS:  05-20    154<H>  |  116<H>  |  43<H>  ----------------------------<  74  3.7   |  24  |  1.50<H>    Ca    9.1      20 May 2020 07:07  Phos  4.2     05-20  Mg     2.4     05-20    TPro  6.7  /  Alb  2.1<L>  /  TBili  0.2  /  DBili  x   /  AST  18  /  ALT  11  /  AlkPhos  167<H>  05-20                          9.9    18.95 )-----------( 330      ( 20 May 2020 07:07 )             33.7     PT/INR - ( 20 May 2020 07:55 )   PT: 12.2 sec;   INR: 1.07 ratio         PTT - ( 20 May 2020 07:55 )  PTT:23.2 sec

## 2020-05-20 NOTE — PROGRESS NOTE ADULT - PROBLEM SELECTOR PLAN 1
AHRF now resolved but was likely due to PNA in the setting of pulmonary hypertension v PE (5/12-5/19) AHRF now resolved but was likely due to PNA in the setting of pulmonary hypertension v PE (S/p 5/12-5/19, very low prob PE on VQ 5/18)  -For PNA, leukocytosis but w/o sputum or blood cultures positive s/p full course of zosyn 5/8-5/12  -For PE, hx of cancer and negative VQ but only after days of full AC and when hypoxia improved. Will continue with sq heparin as no way of indicating diagnosis at this time. If worsens, will consider repeat V/Q AHRF now resolved but was likely due to PNA in the setting of pulmonary hypertension v PE (S/p 5/12-5/19, very low prob PE on VQ 5/18)  -For PNA, leukocytosis but w/o sputum or blood cultures positive s/p full course of zosyn 5/8-5/16  -For PE, hx of cancer and negative VQ but only after days of full AC and when hypoxia improved. Will continue with sq heparin as no way of indicating diagnosis at this time. If worsens, will consider repeat V/Q P/w lower extremity weakness with hx of metastatic cancer and possible UMN signs on exam. Raising concern for metastasis to either brain or spine  -If mass found,  could benefit from RT. Will pursue MRI head, cervical, thoracic, and lumbar spine.  -GBS unlikely b/c this would present as ascending paralysis and hip flexion is stronger than ankle dorsiflexion/plantarflexion. P/w lower extremity weakness with hx of metastatic cancer and possible UMN signs on exam. Raising concern for metastasis to either brain or spine  -If mass found,  could benefit from RT. Will pursue MRI head, cervical, thoracic, and lumbar spine.  -GBS unlikely b/c this would present as ascending paralysis and hip flexion is weaker than ankle dorsiflexion/plantarflexion.  -Monitor bladder scans for any signs of retention.

## 2020-05-20 NOTE — PROGRESS NOTE ADULT - GASTROINTESTINAL DETAILS
soft/nontender/no distention/no guarding
no distention/no guarding/soft/nontender
soft/nontender/no rigidity/no distention/no guarding
no rebound tenderness/soft/nontender/no distention/no guarding
no distention/no rigidity/soft/nontender/no guarding
nontender/soft/no distention/no rebound tenderness

## 2020-05-20 NOTE — PROGRESS NOTE ADULT - PROBLEM SELECTOR PLAN 1
CHRONIC KIDNEY DISEASE, STAGE 3: and ACUTE RENAL FAILURE:   increase water intake   hypokalemia will supplemented   Serum creatinine is  improved   , approximating GFR at increased  ml/min.   There is  progression . No uremic symptoms   evidence of anemia .  Fluid status stable.  Will continue to avoid nephrotoxic drugs.  Patient remains asymptomatic.   Continue current therapy.  hold  diuretic.  hold   ACE inhibitor.  hold   ARB.

## 2020-05-21 LAB — UUN UR-MCNC: 640 MG/DL — SIGNIFICANT CHANGE UP

## 2020-05-22 NOTE — CHART NOTE - NSCHARTNOTEFT_GEN_A_CORE
-Received a call from [facility] that there may have been an issue with the autopsy consent form. I then called admitting and they said the patient's body was already released. I discussed with Dr. Hutson what he had done regarding the autopsy paperwork with the family yesterday. I then called the [facility] and inquired about the issue. They told me that they discussed with the family and the issues were resolved. They said the autopsy would be done Tuesday. They said the consent form's restrictions needed to be changed, which apparently the family did.   -I then called the patient's daughter Trinity ([phone]) and asked her if I could help with the issues and/or answer any questions. Her and her family are upset about the passing of her father. I answered her questions to the best of my ability. I informed her that I had seen the patient in the morning with my team and his mental state was pretty good and he was interacting with us and following commands and we did a neuro exam on him. He didn't have any active symptoms, except his left heel hurt a little when we moved it, so we got him heel offloading boots. He denied CP or SOB. He was saturating well on room air. We shook hands and I tried to encourage him. We also discussed his plan of care and answered his questions.   -I updated Trinity with as much info as I could and answered her questions, attempted to provide some support and closure. She said she felt a little better after our discussion. I offered my condolences again and told her to call us back if we could be of any further assistance.

## 2020-05-26 ENCOUNTER — RESULT REVIEW (OUTPATIENT)
Age: 75
End: 2020-05-26

## 2020-05-26 PROCEDURE — 88313 SPECIAL STAINS GROUP 2: CPT | Mod: 26

## 2020-06-02 PROCEDURE — 76377 3D RENDER W/INTRP POSTPROCES: CPT

## 2020-06-02 PROCEDURE — G0103: CPT

## 2020-06-02 PROCEDURE — 86900 BLOOD TYPING SEROLOGIC ABO: CPT

## 2020-06-02 PROCEDURE — 82553 CREATINE MB FRACTION: CPT

## 2020-06-02 PROCEDURE — 85379 FIBRIN DEGRADATION QUANT: CPT

## 2020-06-02 PROCEDURE — 85027 COMPLETE CBC AUTOMATED: CPT

## 2020-06-02 PROCEDURE — 82728 ASSAY OF FERRITIN: CPT

## 2020-06-02 PROCEDURE — 96374 THER/PROPH/DIAG INJ IV PUSH: CPT | Mod: XU

## 2020-06-02 PROCEDURE — 83036 HEMOGLOBIN GLYCOSYLATED A1C: CPT

## 2020-06-02 PROCEDURE — U0003: CPT

## 2020-06-02 PROCEDURE — 71045 X-RAY EXAM CHEST 1 VIEW: CPT

## 2020-06-02 PROCEDURE — 87070 CULTURE OTHR SPECIMN AEROBIC: CPT

## 2020-06-02 PROCEDURE — 97110 THERAPEUTIC EXERCISES: CPT

## 2020-06-02 PROCEDURE — 82962 GLUCOSE BLOOD TEST: CPT

## 2020-06-02 PROCEDURE — 72128 CT CHEST SPINE W/O DYE: CPT

## 2020-06-02 PROCEDURE — 84540 ASSAY OF URINE/UREA-N: CPT

## 2020-06-02 PROCEDURE — 86850 RBC ANTIBODY SCREEN: CPT

## 2020-06-02 PROCEDURE — 85730 THROMBOPLASTIN TIME PARTIAL: CPT

## 2020-06-02 PROCEDURE — 87635 SARS-COV-2 COVID-19 AMP PRB: CPT

## 2020-06-02 PROCEDURE — 81001 URINALYSIS AUTO W/SCOPE: CPT

## 2020-06-02 PROCEDURE — 72146 MRI CHEST SPINE W/O DYE: CPT

## 2020-06-02 PROCEDURE — 72131 CT LUMBAR SPINE W/O DYE: CPT

## 2020-06-02 PROCEDURE — 84300 ASSAY OF URINE SODIUM: CPT

## 2020-06-02 PROCEDURE — 80053 COMPREHEN METABOLIC PANEL: CPT

## 2020-06-02 PROCEDURE — C8929: CPT

## 2020-06-02 PROCEDURE — 78580 LUNG PERFUSION IMAGING: CPT

## 2020-06-02 PROCEDURE — 84145 PROCALCITONIN (PCT): CPT

## 2020-06-02 PROCEDURE — 83935 ASSAY OF URINE OSMOLALITY: CPT

## 2020-06-02 PROCEDURE — 80048 BASIC METABOLIC PNL TOTAL CA: CPT

## 2020-06-02 PROCEDURE — 82607 VITAMIN B-12: CPT

## 2020-06-02 PROCEDURE — 83605 ASSAY OF LACTIC ACID: CPT

## 2020-06-02 PROCEDURE — 84100 ASSAY OF PHOSPHORUS: CPT

## 2020-06-02 PROCEDURE — 84484 ASSAY OF TROPONIN QUANT: CPT

## 2020-06-02 PROCEDURE — 87086 URINE CULTURE/COLONY COUNT: CPT

## 2020-06-02 PROCEDURE — 82550 ASSAY OF CK (CPK): CPT

## 2020-06-02 PROCEDURE — 72192 CT PELVIS W/O DYE: CPT

## 2020-06-02 PROCEDURE — 94660 CPAP INITIATION&MGMT: CPT

## 2020-06-02 PROCEDURE — 85384 FIBRINOGEN ACTIVITY: CPT

## 2020-06-02 PROCEDURE — 97163 PT EVAL HIGH COMPLEX 45 MIN: CPT

## 2020-06-02 PROCEDURE — 51702 INSERT TEMP BLADDER CATH: CPT

## 2020-06-02 PROCEDURE — 83880 ASSAY OF NATRIURETIC PEPTIDE: CPT

## 2020-06-02 PROCEDURE — A9540: CPT

## 2020-06-02 PROCEDURE — 82947 ASSAY GLUCOSE BLOOD QUANT: CPT

## 2020-06-02 PROCEDURE — 84295 ASSAY OF SERUM SODIUM: CPT

## 2020-06-02 PROCEDURE — 72125 CT NECK SPINE W/O DYE: CPT

## 2020-06-02 PROCEDURE — 82140 ASSAY OF AMMONIA: CPT

## 2020-06-02 PROCEDURE — 82435 ASSAY OF BLOOD CHLORIDE: CPT

## 2020-06-02 PROCEDURE — 82570 ASSAY OF URINE CREATININE: CPT

## 2020-06-02 PROCEDURE — 82330 ASSAY OF CALCIUM: CPT

## 2020-06-02 PROCEDURE — 70450 CT HEAD/BRAIN W/O DYE: CPT

## 2020-06-02 PROCEDURE — 82565 ASSAY OF CREATININE: CPT

## 2020-06-02 PROCEDURE — 85014 HEMATOCRIT: CPT

## 2020-06-02 PROCEDURE — 36600 WITHDRAWAL OF ARTERIAL BLOOD: CPT

## 2020-06-02 PROCEDURE — 86140 C-REACTIVE PROTEIN: CPT

## 2020-06-02 PROCEDURE — 80076 HEPATIC FUNCTION PANEL: CPT

## 2020-06-02 PROCEDURE — 87040 BLOOD CULTURE FOR BACTERIA: CPT

## 2020-06-02 PROCEDURE — 86780 TREPONEMA PALLIDUM: CPT

## 2020-06-02 PROCEDURE — 93308 TTE F-UP OR LMTD: CPT

## 2020-06-02 PROCEDURE — 84443 ASSAY THYROID STIM HORMONE: CPT

## 2020-06-02 PROCEDURE — 93970 EXTREMITY STUDY: CPT

## 2020-06-02 PROCEDURE — 72148 MRI LUMBAR SPINE W/O DYE: CPT

## 2020-06-02 PROCEDURE — 82803 BLOOD GASES ANY COMBINATION: CPT

## 2020-06-02 PROCEDURE — 72141 MRI NECK SPINE W/O DYE: CPT

## 2020-06-02 PROCEDURE — 97530 THERAPEUTIC ACTIVITIES: CPT

## 2020-06-02 PROCEDURE — 93321 DOPPLER ECHO F-UP/LMTD STD: CPT

## 2020-06-02 PROCEDURE — 84132 ASSAY OF SERUM POTASSIUM: CPT

## 2020-06-02 PROCEDURE — 88313 SPECIAL STAINS GROUP 2: CPT

## 2020-06-02 PROCEDURE — 94002 VENT MGMT INPAT INIT DAY: CPT

## 2020-06-02 PROCEDURE — 86901 BLOOD TYPING SEROLOGIC RH(D): CPT

## 2020-06-02 PROCEDURE — 82977 ASSAY OF GGT: CPT

## 2020-06-02 PROCEDURE — 83735 ASSAY OF MAGNESIUM: CPT

## 2020-06-02 PROCEDURE — 85610 PROTHROMBIN TIME: CPT

## 2020-06-02 PROCEDURE — 99285 EMERGENCY DEPT VISIT HI MDM: CPT | Mod: 25

## 2020-06-02 PROCEDURE — 94003 VENT MGMT INPAT SUBQ DAY: CPT

## 2020-06-02 PROCEDURE — 93005 ELECTROCARDIOGRAM TRACING: CPT

## 2020-08-04 NOTE — PROCEDURE NOTE - NSNUMATTEMPT_GEN_A_CORE
1
1
Burow's Advancement Flap Text: The defect edges were debeveled with a #15 scalpel blade.  Given the location of the defect and the proximity to free margins a Burow's advancement flap was deemed most appropriate.  Using a sterile surgical marker, the appropriate advancement flap was drawn incorporating the defect and placing the expected incisions within the relaxed skin tension lines where possible.    The area thus outlined was incised deep to adipose tissue with a #15 scalpel blade.  The skin margins were undermined to an appropriate distance in all directions utilizing iris scissors.

## 2021-09-15 NOTE — ED ADULT NURSE NOTE - PAIN RATING/NUMBER SCALE (0-10): ACTIVITY
My chart message sent     Verna Hoang RN, BSN  [facility]      
2

## 2022-05-06 NOTE — DIETITIAN INITIAL EVALUATION ADULT. - PHYSICAL APPEARANCE
I spoke to Hugo Laila  other (specify)/obese Ht:  68 inches Wt: 222 pounds BMI: 33.8 kg/m2 IBW: 154 pounds(+/-10%) 144%IBW  2+ generalized edema. no pressure ulcers documented.

## 2022-11-04 NOTE — ED ADULT NURSE NOTE - NS PRO AD BILL OF RIGHTS
Assessment done per SGNA guidelines; respirations non-labored; abdomen soft, non-tender; moves all extremities x 4, skin warm, dry, color appropriate for race.    
No

## 2023-09-14 NOTE — ED ADULT NURSE NOTE - ED CARDIAC CAPILLARY REFILL
2 seconds or less Star Wedge Flap Text: The defect edges were debeveled with a #15 scalpel blade.  Given the location of the defect, shape of the defect and the proximity to free margins a star wedge flap was deemed most appropriate.  Using a sterile surgical marker, an appropriate rotation flap was drawn incorporating the defect and placing the expected incisions within the relaxed skin tension lines where possible. The area thus outlined was incised deep to adipose tissue with a #15 scalpel blade.  The skin margins were undermined to an appropriate distance in all directions utilizing iris scissors.

## 2024-05-20 NOTE — ED ADULT NURSE NOTE - CHIEF COMPLAINT QUOTE
pt comes to ED for difficulty breathing. pt was dx with PNA 2 weeks ago and was given PO ABX pt states when he was trying to go to sleep last night he had SOB. pt VSS sating well on RA. EKg performed in triage.
Detail Level: Detailed
Size Of Lesion: 25mm

## 2025-07-17 NOTE — ED ADULT NURSE NOTE - AS O2 DELIVERY
Received Choice Form at: 07/16/25 1614  Agency/Facility Name: [facility] Referral per Choice Form at: 07/17/25  07:43     room air